# Patient Record
Sex: FEMALE | Race: WHITE | NOT HISPANIC OR LATINO | Employment: OTHER | URBAN - METROPOLITAN AREA
[De-identification: names, ages, dates, MRNs, and addresses within clinical notes are randomized per-mention and may not be internally consistent; named-entity substitution may affect disease eponyms.]

---

## 2023-10-17 ENCOUNTER — APPOINTMENT (OUTPATIENT)
Dept: RADIOLOGY | Facility: HOSPITAL | Age: 78
End: 2023-10-17
Payer: MEDICARE

## 2023-10-17 ENCOUNTER — HOSPITAL ENCOUNTER (OUTPATIENT)
Facility: HOSPITAL | Age: 78
Setting detail: OBSERVATION
Discharge: HOME | End: 2023-10-19
Attending: EMERGENCY MEDICINE | Admitting: SURGERY
Payer: MEDICARE

## 2023-10-17 DIAGNOSIS — R52 INTRACTABLE PAIN: Primary | ICD-10-CM

## 2023-10-17 DIAGNOSIS — R07.89 CHEST WALL PAIN: ICD-10-CM
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
## 2023-10-17 LAB
ALBUMIN SERPL BCP-MCNC: 4.5 G/DL (ref 3.4–5)
ALP SERPL-CCNC: 70 U/L (ref 33–136)
ALT SERPL W P-5'-P-CCNC: 11 U/L (ref 7–45)
ANION GAP SERPL CALC-SCNC: 11 MMOL/L (ref 10–20)
AST SERPL W P-5'-P-CCNC: 17 U/L (ref 9–39)
BASOPHILS # BLD AUTO: 0.04 X10*3/UL (ref 0–0.1)
BASOPHILS NFR BLD AUTO: 0.5 %
BILIRUB SERPL-MCNC: 1.6 MG/DL (ref 0–1.2)
BNP SERPL-MCNC: 346 PG/ML (ref 0–99)
BUN SERPL-MCNC: 18 MG/DL (ref 6–23)
CALCIUM SERPL-MCNC: 9.8 MG/DL (ref 8.6–10.3)
CARDIAC TROPONIN I PNL SERPL HS: 23 NG/L (ref 0–13)
CARDIAC TROPONIN I PNL SERPL HS: 25 NG/L (ref 0–13)
CHLORIDE SERPL-SCNC: 102 MMOL/L (ref 98–107)
CO2 SERPL-SCNC: 31 MMOL/L (ref 21–32)
CREAT SERPL-MCNC: 0.93 MG/DL (ref 0.5–1.05)
EOSINOPHIL # BLD AUTO: 0.19 X10*3/UL (ref 0–0.4)
EOSINOPHIL NFR BLD AUTO: 2.6 %
ERYTHROCYTE [DISTWIDTH] IN BLOOD BY AUTOMATED COUNT: 14.4 % (ref 11.5–14.5)
ERYTHROCYTE [DISTWIDTH] IN BLOOD BY AUTOMATED COUNT: 14.5 % (ref 11.5–14.5)
ETHANOL SERPL-MCNC: <10 MG/DL
GFR SERPL CREATININE-BSD FRML MDRD: 63 ML/MIN/1.73M*2
GLUCOSE SERPL-MCNC: 106 MG/DL (ref 74–99)
HCT VFR BLD AUTO: 42.2 % (ref 36–46)
HCT VFR BLD AUTO: 46.5 % (ref 36–46)
HGB BLD-MCNC: 13.9 G/DL (ref 12–16)
HGB BLD-MCNC: 14.6 G/DL (ref 12–16)
IMM GRANULOCYTES # BLD AUTO: 0.03 X10*3/UL (ref 0–0.5)
IMM GRANULOCYTES NFR BLD AUTO: 0.4 % (ref 0–0.9)
INR PPP: 2.5 (ref 0.9–1.1)
LYMPHOCYTES # BLD AUTO: 0.59 X10*3/UL (ref 0.8–3)
LYMPHOCYTES NFR BLD AUTO: 8 %
MCH RBC QN AUTO: 29.6 PG (ref 26–34)
MCH RBC QN AUTO: 30.9 PG (ref 26–34)
MCHC RBC AUTO-ENTMCNC: 31.4 G/DL (ref 32–36)
MCHC RBC AUTO-ENTMCNC: 32.9 G/DL (ref 32–36)
MCV RBC AUTO: 94 FL (ref 80–100)
MCV RBC AUTO: 94 FL (ref 80–100)
MONOCYTES # BLD AUTO: 0.49 X10*3/UL (ref 0.05–0.8)
MONOCYTES NFR BLD AUTO: 6.7 %
NEUTROPHILS # BLD AUTO: 6.01 X10*3/UL (ref 1.6–5.5)
NEUTROPHILS NFR BLD AUTO: 81.8 %
NRBC BLD-RTO: 0 /100 WBCS (ref 0–0)
NRBC BLD-RTO: 0 /100 WBCS (ref 0–0)
PLATELET # BLD AUTO: 157 X10*3/UL (ref 150–450)
PLATELET # BLD AUTO: 162 X10*3/UL (ref 150–450)
PMV BLD AUTO: 9.3 FL (ref 7.5–11.5)
PMV BLD AUTO: 9.4 FL (ref 7.5–11.5)
POTASSIUM SERPL-SCNC: 3.5 MMOL/L (ref 3.5–5.3)
PROT SERPL-MCNC: 7 G/DL (ref 6.4–8.2)
PROTHROMBIN TIME: 28.6 SECONDS (ref 9.8–12.8)
RBC # BLD AUTO: 4.5 X10*6/UL (ref 4–5.2)
RBC # BLD AUTO: 4.94 X10*6/UL (ref 4–5.2)
SODIUM SERPL-SCNC: 140 MMOL/L (ref 136–145)
WBC # BLD AUTO: 7 X10*3/UL (ref 4.4–11.3)
WBC # BLD AUTO: 7.4 X10*3/UL (ref 4.4–11.3)

## 2023-10-17 PROCEDURE — 84075 ASSAY ALKALINE PHOSPHATASE: CPT | Performed by: EMERGENCY MEDICINE

## 2023-10-17 PROCEDURE — 36415 COLL VENOUS BLD VENIPUNCTURE: CPT | Performed by: EMERGENCY MEDICINE

## 2023-10-17 PROCEDURE — 74177 CT ABD & PELVIS W/CONTRAST: CPT | Mod: MG

## 2023-10-17 PROCEDURE — 85025 COMPLETE CBC W/AUTO DIFF WBC: CPT | Performed by: EMERGENCY MEDICINE

## 2023-10-17 PROCEDURE — 73590 X-RAY EXAM OF LOWER LEG: CPT | Mod: RIGHT SIDE | Performed by: RADIOLOGY

## 2023-10-17 PROCEDURE — 2500000001 HC RX 250 WO HCPCS SELF ADMINISTERED DRUGS (ALT 637 FOR MEDICARE OP): Performed by: SURGERY

## 2023-10-17 PROCEDURE — 84484 ASSAY OF TROPONIN QUANT: CPT | Performed by: EMERGENCY MEDICINE

## 2023-10-17 PROCEDURE — 2550000001 HC RX 255 CONTRASTS: Performed by: EMERGENCY MEDICINE

## 2023-10-17 PROCEDURE — 71045 X-RAY EXAM CHEST 1 VIEW: CPT | Mod: FY

## 2023-10-17 PROCEDURE — 73552 X-RAY EXAM OF FEMUR 2/>: CPT | Mod: BILATERAL PROCEDURE | Performed by: RADIOLOGY

## 2023-10-17 PROCEDURE — 73552 X-RAY EXAM OF FEMUR 2/>: CPT | Mod: 50

## 2023-10-17 PROCEDURE — 85027 COMPLETE CBC AUTOMATED: CPT | Mod: 59 | Performed by: SURGERY

## 2023-10-17 PROCEDURE — 70450 CT HEAD/BRAIN W/O DYE: CPT | Performed by: RADIOLOGY

## 2023-10-17 PROCEDURE — 71045 X-RAY EXAM CHEST 1 VIEW: CPT | Performed by: RADIOLOGY

## 2023-10-17 PROCEDURE — 74177 CT ABD & PELVIS W/CONTRAST: CPT | Performed by: RADIOLOGY

## 2023-10-17 PROCEDURE — G0378 HOSPITAL OBSERVATION PER HR: HCPCS

## 2023-10-17 PROCEDURE — 84484 ASSAY OF TROPONIN QUANT: CPT | Mod: 59 | Performed by: EMERGENCY MEDICINE

## 2023-10-17 PROCEDURE — 72125 CT NECK SPINE W/O DYE: CPT | Mod: MG

## 2023-10-17 PROCEDURE — 2500000004 HC RX 250 GENERAL PHARMACY W/ HCPCS (ALT 636 FOR OP/ED): Performed by: EMERGENCY MEDICINE

## 2023-10-17 PROCEDURE — 82077 ASSAY SPEC XCP UR&BREATH IA: CPT | Performed by: EMERGENCY MEDICINE

## 2023-10-17 PROCEDURE — 73590 X-RAY EXAM OF LOWER LEG: CPT | Mod: RT

## 2023-10-17 PROCEDURE — 83880 ASSAY OF NATRIURETIC PEPTIDE: CPT | Performed by: EMERGENCY MEDICINE

## 2023-10-17 PROCEDURE — 70450 CT HEAD/BRAIN W/O DYE: CPT | Mod: ME

## 2023-10-17 PROCEDURE — 2500000004 HC RX 250 GENERAL PHARMACY W/ HCPCS (ALT 636 FOR OP/ED): Mod: MUE | Performed by: PHYSICIAN ASSISTANT

## 2023-10-17 PROCEDURE — 99223 1ST HOSP IP/OBS HIGH 75: CPT | Performed by: SURGERY

## 2023-10-17 PROCEDURE — G0390 TRAUMA RESPONS W/HOSP CRITI: HCPCS

## 2023-10-17 PROCEDURE — 99285 EMERGENCY DEPT VISIT HI MDM: CPT | Performed by: EMERGENCY MEDICINE

## 2023-10-17 PROCEDURE — 2500000005 HC RX 250 GENERAL PHARMACY W/O HCPCS: Performed by: SURGERY

## 2023-10-17 PROCEDURE — 2500000005 HC RX 250 GENERAL PHARMACY W/O HCPCS: Performed by: EMERGENCY MEDICINE

## 2023-10-17 PROCEDURE — 72125 CT NECK SPINE W/O DYE: CPT | Performed by: RADIOLOGY

## 2023-10-17 PROCEDURE — 85610 PROTHROMBIN TIME: CPT | Performed by: EMERGENCY MEDICINE

## 2023-10-17 PROCEDURE — 71260 CT THORAX DX C+: CPT | Performed by: RADIOLOGY

## 2023-10-17 PROCEDURE — G0390 TRAUMA RESPONS W/HOSP CRITI: HCPCS | Performed by: STUDENT IN AN ORGANIZED HEALTH CARE EDUCATION/TRAINING PROGRAM

## 2023-10-17 RX ORDER — LIDOCAINE 560 MG/1
1 PATCH PERCUTANEOUS; TOPICAL; TRANSDERMAL DAILY
Status: DISCONTINUED | OUTPATIENT
Start: 2023-10-17 | End: 2023-10-19 | Stop reason: HOSPADM

## 2023-10-17 RX ORDER — FLUTICASONE PROPIONATE 50 MCG
2 SPRAY, SUSPENSION (ML) NASAL DAILY PRN
COMMUNITY

## 2023-10-17 RX ORDER — METOLAZONE 2.5 MG/1
2.5 TABLET ORAL 3 TIMES WEEKLY
COMMUNITY
Start: 2023-08-08

## 2023-10-17 RX ORDER — MORPHINE SULFATE 2 MG/ML
2 INJECTION, SOLUTION INTRAMUSCULAR; INTRAVENOUS
Status: DISCONTINUED | OUTPATIENT
Start: 2023-10-17 | End: 2023-10-19 | Stop reason: HOSPADM

## 2023-10-17 RX ORDER — FUROSEMIDE 20 MG/1
20 TABLET ORAL DAILY
COMMUNITY
Start: 2023-05-16

## 2023-10-17 RX ORDER — IPRATROPIUM BROMIDE 42 UG/1
1 SPRAY, METERED NASAL 4 TIMES DAILY
Status: DISCONTINUED | OUTPATIENT
Start: 2023-10-18 | End: 2023-10-19

## 2023-10-17 RX ORDER — METOLAZONE 5 MG/1
2.5 TABLET ORAL 3 TIMES WEEKLY
Status: DISCONTINUED | OUTPATIENT
Start: 2023-10-18 | End: 2023-10-19 | Stop reason: HOSPADM

## 2023-10-17 RX ORDER — METOPROLOL SUCCINATE 50 MG/1
50 TABLET, EXTENDED RELEASE ORAL DAILY
Status: DISCONTINUED | OUTPATIENT
Start: 2023-10-17 | End: 2023-10-19 | Stop reason: HOSPADM

## 2023-10-17 RX ORDER — AZELASTINE 1 MG/ML
1 SPRAY, METERED NASAL 2 TIMES DAILY
Status: DISCONTINUED | OUTPATIENT
Start: 2023-10-17 | End: 2023-10-19 | Stop reason: HOSPADM

## 2023-10-17 RX ORDER — OXYBUTYNIN CHLORIDE 10 MG/1
10 TABLET, EXTENDED RELEASE ORAL DAILY
Status: ON HOLD | COMMUNITY
End: 2023-10-18 | Stop reason: ENTERED-IN-ERROR

## 2023-10-17 RX ORDER — ATORVASTATIN CALCIUM 10 MG/1
10 TABLET, FILM COATED ORAL NIGHTLY
Status: DISCONTINUED | OUTPATIENT
Start: 2023-10-17 | End: 2023-10-19 | Stop reason: HOSPADM

## 2023-10-17 RX ORDER — TROSPIUM CHLORIDE ER 60 MG/1
60 CAPSULE ORAL DAILY
Status: ON HOLD | COMMUNITY
End: 2023-10-18 | Stop reason: ENTERED-IN-ERROR

## 2023-10-17 RX ORDER — FLUTICASONE PROPIONATE 50 MCG
2 SPRAY, SUSPENSION (ML) NASAL DAILY
Status: DISCONTINUED | OUTPATIENT
Start: 2023-10-18 | End: 2023-10-19 | Stop reason: HOSPADM

## 2023-10-17 RX ORDER — ONDANSETRON HYDROCHLORIDE 2 MG/ML
4 INJECTION, SOLUTION INTRAVENOUS EVERY 8 HOURS PRN
Status: DISCONTINUED | OUTPATIENT
Start: 2023-10-17 | End: 2023-10-19 | Stop reason: HOSPADM

## 2023-10-17 RX ORDER — MORPHINE SULFATE 4 MG/ML
4 INJECTION, SOLUTION INTRAMUSCULAR; INTRAVENOUS ONCE
Status: COMPLETED | OUTPATIENT
Start: 2023-10-17 | End: 2023-10-17

## 2023-10-17 RX ORDER — ACETAMINOPHEN 325 MG/1
975 TABLET ORAL EVERY 8 HOURS
Status: DISCONTINUED | OUTPATIENT
Start: 2023-10-17 | End: 2023-10-19 | Stop reason: HOSPADM

## 2023-10-17 RX ORDER — IPRATROPIUM BROMIDE 42 UG/1
1 SPRAY, METERED NASAL 4 TIMES DAILY
Status: ON HOLD | COMMUNITY
End: 2023-10-18 | Stop reason: ENTERED-IN-ERROR

## 2023-10-17 RX ORDER — OXYBUTYNIN CHLORIDE 5 MG/1
5 TABLET ORAL 2 TIMES DAILY
Status: DISCONTINUED | OUTPATIENT
Start: 2023-10-17 | End: 2023-10-19

## 2023-10-17 RX ORDER — PILOCARPINE HYDROCHLORIDE 5 MG/1
5 TABLET, FILM COATED ORAL 3 TIMES DAILY
Status: ON HOLD | COMMUNITY
End: 2023-10-18 | Stop reason: ENTERED-IN-ERROR

## 2023-10-17 RX ORDER — AMLODIPINE BESYLATE 5 MG/1
5 TABLET ORAL DAILY
Status: ON HOLD | COMMUNITY
End: 2023-10-18 | Stop reason: ENTERED-IN-ERROR

## 2023-10-17 RX ORDER — CARBIDOPA AND LEVODOPA 25; 100 MG/1; MG/1
1 TABLET ORAL
Status: ON HOLD | COMMUNITY
End: 2023-10-18 | Stop reason: ENTERED-IN-ERROR

## 2023-10-17 RX ORDER — FUROSEMIDE 20 MG/1
20 TABLET ORAL DAILY
Status: DISCONTINUED | OUTPATIENT
Start: 2023-10-17 | End: 2023-10-19 | Stop reason: HOSPADM

## 2023-10-17 RX ORDER — LIDOCAINE 560 MG/1
1 PATCH PERCUTANEOUS; TOPICAL; TRANSDERMAL DAILY
Status: DISCONTINUED | OUTPATIENT
Start: 2023-10-17 | End: 2023-10-17

## 2023-10-17 RX ORDER — WARFARIN 2.5 MG/1
2.5 TABLET ORAL
COMMUNITY
Start: 2023-10-14

## 2023-10-17 RX ORDER — KETOROLAC TROMETHAMINE 30 MG/ML
7.5 INJECTION, SOLUTION INTRAMUSCULAR; INTRAVENOUS ONCE
Status: COMPLETED | OUTPATIENT
Start: 2023-10-17 | End: 2023-10-17

## 2023-10-17 RX ORDER — FUROSEMIDE 10 MG/ML
40 INJECTION INTRAMUSCULAR; INTRAVENOUS ONCE
Status: COMPLETED | OUTPATIENT
Start: 2023-10-17 | End: 2023-10-17

## 2023-10-17 RX ORDER — PILOCARPINE HYDROCHLORIDE 5 MG/1
5 TABLET, FILM COATED ORAL 3 TIMES DAILY
Status: DISCONTINUED | OUTPATIENT
Start: 2023-10-17 | End: 2023-10-18 | Stop reason: ALTCHOICE

## 2023-10-17 RX ORDER — METHOCARBAMOL 500 MG/1
250 TABLET, FILM COATED ORAL EVERY 12 HOURS
Status: DISCONTINUED | OUTPATIENT
Start: 2023-10-17 | End: 2023-10-19 | Stop reason: HOSPADM

## 2023-10-17 RX ORDER — NYSTATIN 100000 [USP'U]/G
1 POWDER TOPICAL 2 TIMES DAILY
Status: DISCONTINUED | OUTPATIENT
Start: 2023-10-17 | End: 2023-10-19 | Stop reason: HOSPADM

## 2023-10-17 RX ORDER — MIRABEGRON 25 MG/1
25 TABLET, FILM COATED, EXTENDED RELEASE ORAL DAILY
Status: ON HOLD | COMMUNITY
End: 2023-10-18 | Stop reason: ENTERED-IN-ERROR

## 2023-10-17 RX ORDER — POLYETHYLENE GLYCOL 3350 17 G/17G
17 POWDER, FOR SOLUTION ORAL DAILY
Status: DISCONTINUED | OUTPATIENT
Start: 2023-10-17 | End: 2023-10-19 | Stop reason: HOSPADM

## 2023-10-17 RX ORDER — CARBIDOPA AND LEVODOPA 25; 100 MG/1; MG/1
1 TABLET ORAL DAILY
Status: DISCONTINUED | OUTPATIENT
Start: 2023-10-17 | End: 2023-10-19

## 2023-10-17 RX ORDER — METOPROLOL SUCCINATE 50 MG/1
50 TABLET, EXTENDED RELEASE ORAL DAILY
COMMUNITY
Start: 2023-03-27

## 2023-10-17 RX ORDER — ATORVASTATIN CALCIUM 10 MG/1
1 TABLET, FILM COATED ORAL DAILY
COMMUNITY
Start: 2023-05-04

## 2023-10-17 RX ORDER — OXYCODONE HYDROCHLORIDE 5 MG/1
5 TABLET ORAL EVERY 6 HOURS PRN
Status: DISCONTINUED | OUTPATIENT
Start: 2023-10-17 | End: 2023-10-19 | Stop reason: HOSPADM

## 2023-10-17 RX ORDER — NALOXONE HYDROCHLORIDE 0.4 MG/ML
0.2 INJECTION, SOLUTION INTRAMUSCULAR; INTRAVENOUS; SUBCUTANEOUS EVERY 5 MIN PRN
Status: DISCONTINUED | OUTPATIENT
Start: 2023-10-17 | End: 2023-10-19 | Stop reason: HOSPADM

## 2023-10-17 RX ORDER — OXYCODONE HYDROCHLORIDE 5 MG/1
2.5 TABLET ORAL EVERY 6 HOURS PRN
Status: DISCONTINUED | OUTPATIENT
Start: 2023-10-17 | End: 2023-10-19 | Stop reason: HOSPADM

## 2023-10-17 RX ORDER — MUPIROCIN 20 MG/G
1 OINTMENT TOPICAL
Status: ON HOLD | COMMUNITY
Start: 2023-06-27 | End: 2023-10-18 | Stop reason: ENTERED-IN-ERROR

## 2023-10-17 RX ORDER — AZELASTINE 1 MG/ML
1 SPRAY, METERED NASAL 2 TIMES DAILY
COMMUNITY

## 2023-10-17 RX ORDER — AMLODIPINE BESYLATE 5 MG/1
5 TABLET ORAL DAILY
Status: DISCONTINUED | OUTPATIENT
Start: 2023-10-17 | End: 2023-10-19

## 2023-10-17 RX ORDER — GUAIFENESIN 600 MG/1
600 TABLET, EXTENDED RELEASE ORAL 2 TIMES DAILY
Status: ON HOLD | COMMUNITY
Start: 2023-04-13 | End: 2023-10-18 | Stop reason: ENTERED-IN-ERROR

## 2023-10-17 RX ORDER — ONDANSETRON 4 MG/1
4 TABLET, FILM COATED ORAL EVERY 8 HOURS PRN
Status: DISCONTINUED | OUTPATIENT
Start: 2023-10-17 | End: 2023-10-19 | Stop reason: HOSPADM

## 2023-10-17 RX ORDER — WARFARIN 2.5 MG/1
2.5 TABLET ORAL DAILY
Status: DISCONTINUED | OUTPATIENT
Start: 2023-10-17 | End: 2023-10-19 | Stop reason: HOSPADM

## 2023-10-17 RX ADMIN — FUROSEMIDE 40 MG: 10 INJECTION, SOLUTION INTRAMUSCULAR; INTRAVENOUS at 13:45

## 2023-10-17 RX ADMIN — METOPROLOL SUCCINATE 50 MG: 50 TABLET, EXTENDED RELEASE ORAL at 18:49

## 2023-10-17 RX ADMIN — MORPHINE SULFATE 4 MG: 4 INJECTION, SOLUTION INTRAMUSCULAR; INTRAVENOUS at 13:35

## 2023-10-17 RX ADMIN — ACETAMINOPHEN 975 MG: 325 TABLET ORAL at 18:36

## 2023-10-17 RX ADMIN — MORPHINE SULFATE 4 MG: 4 INJECTION, SOLUTION INTRAMUSCULAR; INTRAVENOUS at 11:51

## 2023-10-17 RX ADMIN — LIDOCAINE 1 PATCH: 4 PATCH TOPICAL at 17:05

## 2023-10-17 RX ADMIN — IOHEXOL 100 ML: 350 INJECTION, SOLUTION INTRAVENOUS at 11:41

## 2023-10-17 RX ADMIN — LIDOCAINE 1 PATCH: 4 PATCH TOPICAL at 13:44

## 2023-10-17 RX ADMIN — ATORVASTATIN CALCIUM 10 MG: 10 TABLET, FILM COATED ORAL at 20:47

## 2023-10-17 RX ADMIN — KETOROLAC TROMETHAMINE 7.5 MG: 30 INJECTION, SOLUTION INTRAMUSCULAR at 13:36

## 2023-10-17 RX ADMIN — AMLODIPINE BESYLATE 5 MG: 5 TABLET ORAL at 18:49

## 2023-10-17 RX ADMIN — WARFARIN SODIUM 2.5 MG: 2.5 TABLET ORAL at 20:47

## 2023-10-17 SDOH — SOCIAL STABILITY: SOCIAL INSECURITY: WERE YOU ABLE TO COMPLETE ALL THE BEHAVIORAL HEALTH SCREENINGS?: YES

## 2023-10-17 SDOH — SOCIAL STABILITY: SOCIAL INSECURITY
WITHIN THE LAST YEAR, HAVE YOU BEEN KICKED, HIT, SLAPPED, OR OTHERWISE PHYSICALLY HURT BY YOUR PARTNER OR EX-PARTNER?: NO

## 2023-10-17 SDOH — SOCIAL STABILITY: SOCIAL INSECURITY: WITHIN THE LAST YEAR, HAVE YOU BEEN HUMILIATED OR EMOTIONALLY ABUSED IN OTHER WAYS BY YOUR PARTNER OR EX-PARTNER?: NO

## 2023-10-17 SDOH — ECONOMIC STABILITY: INCOME INSECURITY: HOW HARD IS IT FOR YOU TO PAY FOR THE VERY BASICS LIKE FOOD, HOUSING, MEDICAL CARE, AND HEATING?: PATIENT DECLINED

## 2023-10-17 SDOH — SOCIAL STABILITY: SOCIAL NETWORK: IN A TYPICAL WEEK, HOW MANY TIMES DO YOU TALK ON THE PHONE WITH FAMILY, FRIENDS, OR NEIGHBORS?: PATIENT DECLINED

## 2023-10-17 SDOH — SOCIAL STABILITY: SOCIAL NETWORK: HOW OFTEN DO YOU GET TOGETHER WITH FRIENDS OR RELATIVES?: PATIENT DECLINED

## 2023-10-17 SDOH — HEALTH STABILITY: MENTAL HEALTH: HOW OFTEN DO YOU HAVE A DRINK CONTAINING ALCOHOL?: NEVER

## 2023-10-17 SDOH — SOCIAL STABILITY: SOCIAL INSECURITY
WITHIN THE LAST YEAR, HAVE TO BEEN RAPED OR FORCED TO HAVE ANY KIND OF SEXUAL ACTIVITY BY YOUR PARTNER OR EX-PARTNER?: NO

## 2023-10-17 SDOH — ECONOMIC STABILITY: TRANSPORTATION INSECURITY
IN THE PAST 12 MONTHS, HAS LACK OF TRANSPORTATION KEPT YOU FROM MEETINGS, WORK, OR FROM GETTING THINGS NEEDED FOR DAILY LIVING?: PATIENT DECLINED

## 2023-10-17 SDOH — SOCIAL STABILITY: SOCIAL INSECURITY: DO YOU FEEL ANYONE HAS EXPLOITED OR TAKEN ADVANTAGE OF YOU FINANCIALLY OR OF YOUR PERSONAL PROPERTY?: NO

## 2023-10-17 SDOH — SOCIAL STABILITY: SOCIAL INSECURITY: HAS ANYONE EVER THREATENED TO HURT YOUR FAMILY OR YOUR PETS?: NO

## 2023-10-17 SDOH — HEALTH STABILITY: MENTAL HEALTH: HOW OFTEN DO YOU HAVE 6 OR MORE DRINKS ON ONE OCCASION?: NEVER

## 2023-10-17 SDOH — SOCIAL STABILITY: SOCIAL INSECURITY: DOES ANYONE TRY TO KEEP YOU FROM HAVING/CONTACTING OTHER FRIENDS OR DOING THINGS OUTSIDE YOUR HOME?: NO

## 2023-10-17 SDOH — SOCIAL STABILITY: SOCIAL INSECURITY: WITHIN THE LAST YEAR, HAVE YOU BEEN AFRAID OF YOUR PARTNER OR EX-PARTNER?: NO

## 2023-10-17 SDOH — SOCIAL STABILITY: SOCIAL NETWORK: HOW OFTEN DO YOU ATTEND CHURCH OR RELIGIOUS SERVICES?: PATIENT DECLINED

## 2023-10-17 SDOH — SOCIAL STABILITY: SOCIAL INSECURITY: ABUSE: ADULT

## 2023-10-17 SDOH — SOCIAL STABILITY: SOCIAL NETWORK: HOW OFTEN DO YOU ATTENT MEETINGS OF THE CLUB OR ORGANIZATION YOU BELONG TO?: PATIENT DECLINED

## 2023-10-17 SDOH — ECONOMIC STABILITY: INCOME INSECURITY
IN THE PAST 12 MONTHS, HAS THE ELECTRIC, GAS, OIL, OR WATER COMPANY THREATENED TO SHUT OFF SERVICE IN YOUR HOME?: PATIENT REFUSED

## 2023-10-17 SDOH — ECONOMIC STABILITY: HOUSING INSECURITY
IN THE LAST 12 MONTHS, WAS THERE A TIME WHEN YOU DID NOT HAVE A STEADY PLACE TO SLEEP OR SLEPT IN A SHELTER (INCLUDING NOW)?: PATIENT REFUSED

## 2023-10-17 SDOH — SOCIAL STABILITY: SOCIAL INSECURITY: ARE THERE ANY APPARENT SIGNS OF INJURIES/BEHAVIORS THAT COULD BE RELATED TO ABUSE/NEGLECT?: NO

## 2023-10-17 SDOH — SOCIAL STABILITY: SOCIAL INSECURITY: HAVE YOU HAD THOUGHTS OF HARMING ANYONE ELSE?: NO

## 2023-10-17 SDOH — HEALTH STABILITY: MENTAL HEALTH: HOW MANY STANDARD DRINKS CONTAINING ALCOHOL DO YOU HAVE ON A TYPICAL DAY?: PATIENT DOES NOT DRINK

## 2023-10-17 SDOH — SOCIAL STABILITY: SOCIAL NETWORK: ARE YOU MARRIED, WIDOWED, DIVORCED, SEPARATED, NEVER MARRIED, OR LIVING WITH A PARTNER?: PATIENT DECLINED

## 2023-10-17 SDOH — ECONOMIC STABILITY: TRANSPORTATION INSECURITY
IN THE PAST 12 MONTHS, HAS THE LACK OF TRANSPORTATION KEPT YOU FROM MEDICAL APPOINTMENTS OR FROM GETTING MEDICATIONS?: PATIENT DECLINED

## 2023-10-17 SDOH — SOCIAL STABILITY: SOCIAL INSECURITY: ARE YOU OR HAVE YOU BEEN THREATENED OR ABUSED PHYSICALLY, EMOTIONALLY, OR SEXUALLY BY ANYONE?: NO

## 2023-10-17 SDOH — ECONOMIC STABILITY: HOUSING INSECURITY: IN THE LAST 12 MONTHS, HOW MANY PLACES HAVE YOU LIVED?: 1

## 2023-10-17 SDOH — ECONOMIC STABILITY: INCOME INSECURITY: IN THE LAST 12 MONTHS, WAS THERE A TIME WHEN YOU WERE NOT ABLE TO PAY THE MORTGAGE OR RENT ON TIME?: PATIENT REFUSED

## 2023-10-17 SDOH — SOCIAL STABILITY: SOCIAL NETWORK
DO YOU BELONG TO ANY CLUBS OR ORGANIZATIONS SUCH AS CHURCH GROUPS UNIONS, FRATERNAL OR ATHLETIC GROUPS, OR SCHOOL GROUPS?: PATIENT DECLINED

## 2023-10-17 SDOH — ECONOMIC STABILITY: FOOD INSECURITY: WITHIN THE PAST 12 MONTHS, YOU WORRIED THAT YOUR FOOD WOULD RUN OUT BEFORE YOU GOT MONEY TO BUY MORE.: PATIENT DECLINED

## 2023-10-17 SDOH — SOCIAL STABILITY: SOCIAL INSECURITY: DO YOU FEEL UNSAFE GOING BACK TO THE PLACE WHERE YOU ARE LIVING?: NO

## 2023-10-17 SDOH — ECONOMIC STABILITY: FOOD INSECURITY: WITHIN THE PAST 12 MONTHS, THE FOOD YOU BOUGHT JUST DIDN'T LAST AND YOU DIDN'T HAVE MONEY TO GET MORE.: PATIENT DECLINED

## 2023-10-17 SDOH — HEALTH STABILITY: MENTAL HEALTH
STRESS IS WHEN SOMEONE FEELS TENSE, NERVOUS, ANXIOUS, OR CAN'T SLEEP AT NIGHT BECAUSE THEIR MIND IS TROUBLED. HOW STRESSED ARE YOU?: PATIENT DECLINED

## 2023-10-17 ASSESSMENT — COLUMBIA-SUICIDE SEVERITY RATING SCALE - C-SSRS
1. IN THE PAST MONTH, HAVE YOU WISHED YOU WERE DEAD OR WISHED YOU COULD GO TO SLEEP AND NOT WAKE UP?: NO
2. HAVE YOU ACTUALLY HAD ANY THOUGHTS OF KILLING YOURSELF?: NO
6. HAVE YOU EVER DONE ANYTHING, STARTED TO DO ANYTHING, OR PREPARED TO DO ANYTHING TO END YOUR LIFE?: NO

## 2023-10-17 ASSESSMENT — PAIN SCALES - PAIN ASSESSMENT IN ADVANCED DEMENTIA (PAINAD): BODYLANGUAGE: RELAXED

## 2023-10-17 ASSESSMENT — COGNITIVE AND FUNCTIONAL STATUS - GENERAL
MOVING FROM LYING ON BACK TO SITTING ON SIDE OF FLAT BED WITH BEDRAILS: A LITTLE
STANDING UP FROM CHAIR USING ARMS: A LITTLE
MOBILITY SCORE: 18
HELP NEEDED FOR BATHING: A LITTLE
MOVING FROM LYING ON BACK TO SITTING ON SIDE OF FLAT BED WITH BEDRAILS: A LITTLE
EATING MEALS: A LITTLE
DRESSING REGULAR LOWER BODY CLOTHING: A LITTLE
TOILETING: A LITTLE
STANDING UP FROM CHAIR USING ARMS: A LITTLE
HELP NEEDED FOR BATHING: A LITTLE
TURNING FROM BACK TO SIDE WHILE IN FLAT BAD: A LITTLE
MOBILITY SCORE: 18
WALKING IN HOSPITAL ROOM: A LITTLE
PERSONAL GROOMING: A LITTLE
DAILY ACTIVITIY SCORE: 18
DRESSING REGULAR LOWER BODY CLOTHING: A LITTLE
MOVING TO AND FROM BED TO CHAIR: A LITTLE
CLIMB 3 TO 5 STEPS WITH RAILING: A LITTLE
DRESSING REGULAR UPPER BODY CLOTHING: A LITTLE
CLIMB 3 TO 5 STEPS WITH RAILING: A LITTLE
DRESSING REGULAR UPPER BODY CLOTHING: A LITTLE
PERSONAL GROOMING: A LITTLE
MOVING TO AND FROM BED TO CHAIR: A LITTLE
PATIENT BASELINE BEDBOUND: NO
TOILETING: A LITTLE
WALKING IN HOSPITAL ROOM: A LITTLE
TURNING FROM BACK TO SIDE WHILE IN FLAT BAD: A LITTLE

## 2023-10-17 ASSESSMENT — ENCOUNTER SYMPTOMS
NAUSEA: 0
DIZZINESS: 0
VOMITING: 0
NUMBNESS: 0
FEVER: 0
NECK PAIN: 0
SORE THROAT: 0
WHEEZING: 0
SHORTNESS OF BREATH: 0
LIGHT-HEADEDNESS: 0
ABDOMINAL PAIN: 0
PALPITATIONS: 0
CHILLS: 0
ARTHRALGIAS: 0
APPETITE CHANGE: 0
FREQUENCY: 0
DYSURIA: 0

## 2023-10-17 ASSESSMENT — ACTIVITIES OF DAILY LIVING (ADL)
HEARING - RIGHT EAR: FUNCTIONAL
BATHING: NEEDS ASSISTANCE
JUDGMENT_ADEQUATE_SAFELY_COMPLETE_DAILY_ACTIVITIES: NO
LACK_OF_TRANSPORTATION: PATIENT DECLINED
ADEQUATE_TO_COMPLETE_ADL: YES
TOILETING: NEEDS ASSISTANCE
FEEDING YOURSELF: NEEDS ASSISTANCE
HEARING - LEFT EAR: FUNCTIONAL
DRESSING YOURSELF: NEEDS ASSISTANCE
ASSISTIVE_DEVICE: WALKER
PATIENT'S MEMORY ADEQUATE TO SAFELY COMPLETE DAILY ACTIVITIES?: NO
WALKS IN HOME: NEEDS ASSISTANCE
GROOMING: NEEDS ASSISTANCE

## 2023-10-17 ASSESSMENT — LIFESTYLE VARIABLES
EVER FELT BAD OR GUILTY ABOUT YOUR DRINKING: NO
AUDIT-C TOTAL SCORE: 0
SKIP TO QUESTIONS 9-10: 1
SKIP TO QUESTIONS 9-10: 1
HAVE YOU EVER FELT YOU SHOULD CUT DOWN ON YOUR DRINKING: NO
HOW OFTEN DO YOU HAVE 6 OR MORE DRINKS ON ONE OCCASION: NEVER
HOW OFTEN DO YOU HAVE A DRINK CONTAINING ALCOHOL: NEVER
REASON UNABLE TO ASSESS: NO
HOW OFTEN DO YOU HAVE 6 OR MORE DRINKS ON ONE OCCASION: NEVER
HOW OFTEN DO YOU HAVE A DRINK CONTAINING ALCOHOL: NEVER
AUDIT-C TOTAL SCORE: 0
HAVE PEOPLE ANNOYED YOU BY CRITICIZING YOUR DRINKING: NO
AUDIT-C TOTAL SCORE: 0
EVER HAD A DRINK FIRST THING IN THE MORNING TO STEADY YOUR NERVES TO GET RID OF A HANGOVER: NO
HOW MANY STANDARD DRINKS CONTAINING ALCOHOL DO YOU HAVE ON A TYPICAL DAY: PATIENT DOES NOT DRINK
PRESCIPTION_ABUSE_PAST_12_MONTHS: NO
SUBSTANCE_ABUSE_PAST_12_MONTHS: NO

## 2023-10-17 ASSESSMENT — PATIENT HEALTH QUESTIONNAIRE - PHQ9
2. FEELING DOWN, DEPRESSED OR HOPELESS: NOT AT ALL
1. LITTLE INTEREST OR PLEASURE IN DOING THINGS: NOT AT ALL
SUM OF ALL RESPONSES TO PHQ9 QUESTIONS 1 & 2: 0
2. FEELING DOWN, DEPRESSED OR HOPELESS: NOT AT ALL
1. LITTLE INTEREST OR PLEASURE IN DOING THINGS: NOT AT ALL
SUM OF ALL RESPONSES TO PHQ9 QUESTIONS 1 & 2: 0

## 2023-10-17 ASSESSMENT — PAIN SCALES - GENERAL
PAINLEVEL_OUTOF10: 8
PAINLEVEL_OUTOF10: 8
PAINLEVEL_OUTOF10: 5 - MODERATE PAIN
PAINLEVEL_OUTOF10: 7
PAINLEVEL_OUTOF10: 5 - MODERATE PAIN
PAINLEVEL_OUTOF10: 0 - NO PAIN

## 2023-10-17 ASSESSMENT — PAIN DESCRIPTION - PROGRESSION: CLINICAL_PROGRESSION: NOT CHANGED

## 2023-10-17 ASSESSMENT — PAIN - FUNCTIONAL ASSESSMENT: PAIN_FUNCTIONAL_ASSESSMENT: 0-10

## 2023-10-17 NOTE — ED PROVIDER NOTES
HPI   Chief Complaint   Patient presents with    Shortness of Breath    Fall       HPI  HISTORY OF PRESENT ILLNESS:  Patient is a 78-year-old female presents the emergency department status post fall.  Patient states that she was getting out of her chair when she got tangled up in a blanket and fell forward into a walker.  She then struck her head and right side against something else in the room.  No loss of consciousness.  She is having right-sided chest wall pain.  Patient is currently on anticoagulation.    Past Medical History: Tachybradycardia syndrome, atrial fibrillation, status post pacemaker, diastolic heart failure, AV valve disorder, hypertension, hyperlipidemia, mitral valve disorder, stroke  Past Surgical History: Significant for pacemaker  Family History: family history not pertinent to presenting problem or chief complaint  Social History: Lives with family    __________________________________________________________  PHYSICAL EXAM:    Appearance: Elderly  female, appears stated age, alert, oriented , cooperative   Skin: Intact,  dry skin, no lesions, rash, petechiae or purpura.   Eyes: PERRLA, EOMs intact,  Conjunctiva pink with no redness or exudates.    HENT: Normocephalic, atraumatic. Nares patent   Neck: Supple. Trachea at midline.   Pulmonary: Lung sounds are clear bilaterally.  There is no rales, rhonchi, or wheezing.  Cardiac: Regular rate and rhythm, no rubs, murmurs, or gallops. No JVD.  Right-sided chest wall tenderness present  Abdomen: Abdomen is soft, nontender, and nondistended.  No palpable organomegaly.  No rebound or guarding.  No CVA tenderness. Nonsurgical abdomen  Genitourinary: Exam deferred.  Musculoskeletal: Patient does have lower L-spine tenderness, at approximately L4.  Patient also had tenderness with the right tib-fib without ankle or knee pain or instability.  No edema, pain, cyanosis, or deformity in extremities. Pulses full and equal.   Neurological: Slow to  speech, cranial nerves are grossly intact, grossly normal sensation, no weakness, no focal findings identified.    __________________________________________________________  MEDICAL DECISION MAKING:    Patient was seen and examined as a limited trauma activation due to concern of mechanical fall. Differential diagnosis for pain includes rib fractures, pneumothorax, intra-abdominal disease process, tib-fib fracture.  Patient had a mechanical fall when her feet tangled with blankets.  Patient was also complaining of shortness of breath here, prompting me to order a cardiac work-up with the patient's extensive    Patient's chest abdomen pelvis showed no rib fractures.  There was evidence of pulmonary edema right heart dysfunction.  Patient was ordered a IV dose of Lasix.  Patient was reevaluated.  She was continued to have pain and difficulty with standing.  I ordered another round of pain medication in addition to symptom spirometer.  Multimodal use including Toradol and lidocaine patch was used.  The patient afterwards was reevaluated UTI pain was uncomfortable with discharge.  Daughter was at bedside and provide additional history.  They were agreeable with admission plan.  I spoke to trauma surgery who agreed to have patient admitted.  She asked that I consulted IMS.  IMS was consulted    Chronic Medical Conditions Significantly Affecting Care: History of anticoagulation    External Records Reviewed: I reviewed recent and relevant outside records including: Cardiology note from May 8, 2023    Patient twelve-lead EKG shows atrial flutter with a ventricular rate 60, normal PA interval, left axis deviation, right bundle branch block present, prolonged QT with QTc of 493 ms, appears unchanged when compared to prior EKG from February 25, 2021    Jimy Brambila  Emergency Medicine                  [place] Coma Scale Score: 15                  Patient History   History reviewed. No pertinent past medical history.  History  reviewed. No pertinent surgical history.  No family history on file.  Social History     Tobacco Use    Smoking status: Not on file    Smokeless tobacco: Not on file   Substance Use Topics    Alcohol use: Not on file    Drug use: Not on file       Physical Exam   ED Triage Vitals   Temp Heart Rate Resp BP   10/17/23 1057 10/17/23 1058 10/17/23 1058 10/17/23 1058   36.3 °C (97.4 °F) 60 20 173/89      SpO2 Temp Source Heart Rate Source Patient Position   10/17/23 1058 10/17/23 1058 -- 10/17/23 1058   95 % Temporal  Lying      BP Location FiO2 (%)     10/17/23 1058 --     Left arm        Physical Exam    ED Course & MDM   ED Course as of 10/17/23 1550   Tue Oct 17, 2023   1531 Talked to transfer [WJ]      ED Course User Index  [WJ] Jimy Brambila DO         Diagnoses as of 10/17/23 1550   Intractable pain   Chest wall pain       Medical Decision Making      Procedure  Procedures     iJmy Brambila DO  10/17/23 1556

## 2023-10-18 PROBLEM — S29.8XXA BLUNT CHEST TRAUMA: Status: ACTIVE | Noted: 2023-10-18

## 2023-10-18 PROBLEM — Z79.01 CHRONIC ANTICOAGULATION: Status: ACTIVE | Noted: 2023-10-18

## 2023-10-18 LAB
ALBUMIN SERPL BCP-MCNC: 3.7 G/DL (ref 3.4–5)
ALP SERPL-CCNC: 60 U/L (ref 33–136)
ALT SERPL W P-5'-P-CCNC: 9 U/L (ref 7–45)
ANION GAP SERPL CALC-SCNC: 10 MMOL/L (ref 10–20)
AST SERPL W P-5'-P-CCNC: 18 U/L (ref 9–39)
BILIRUB SERPL-MCNC: 1.8 MG/DL (ref 0–1.2)
BUN SERPL-MCNC: 18 MG/DL (ref 6–23)
CALCIUM SERPL-MCNC: 8.9 MG/DL (ref 8.6–10.3)
CHLORIDE SERPL-SCNC: 102 MMOL/L (ref 98–107)
CO2 SERPL-SCNC: 31 MMOL/L (ref 21–32)
CREAT SERPL-MCNC: 0.97 MG/DL (ref 0.5–1.05)
ERYTHROCYTE [DISTWIDTH] IN BLOOD BY AUTOMATED COUNT: 14.6 % (ref 11.5–14.5)
GFR SERPL CREATININE-BSD FRML MDRD: 60 ML/MIN/1.73M*2
GLUCOSE SERPL-MCNC: 91 MG/DL (ref 74–99)
HCT VFR BLD AUTO: 39.8 % (ref 36–46)
HGB BLD-MCNC: 12.8 G/DL (ref 12–16)
INR PPP: 2.7 (ref 0.9–1.1)
MCH RBC QN AUTO: 30.2 PG (ref 26–34)
MCHC RBC AUTO-ENTMCNC: 32.2 G/DL (ref 32–36)
MCV RBC AUTO: 94 FL (ref 80–100)
NRBC BLD-RTO: 0 /100 WBCS (ref 0–0)
PLATELET # BLD AUTO: 147 X10*3/UL (ref 150–450)
PMV BLD AUTO: 9.8 FL (ref 7.5–11.5)
POTASSIUM SERPL-SCNC: 3.5 MMOL/L (ref 3.5–5.3)
PROT SERPL-MCNC: 6 G/DL (ref 6.4–8.2)
PROTHROMBIN TIME: 30.5 SECONDS (ref 9.8–12.8)
RBC # BLD AUTO: 4.24 X10*6/UL (ref 4–5.2)
SODIUM SERPL-SCNC: 139 MMOL/L (ref 136–145)
WBC # BLD AUTO: 5.1 X10*3/UL (ref 4.4–11.3)

## 2023-10-18 PROCEDURE — 85610 PROTHROMBIN TIME: CPT | Performed by: PHYSICIAN ASSISTANT

## 2023-10-18 PROCEDURE — G0378 HOSPITAL OBSERVATION PER HR: HCPCS

## 2023-10-18 PROCEDURE — 2500000001 HC RX 250 WO HCPCS SELF ADMINISTERED DRUGS (ALT 637 FOR MEDICARE OP): Performed by: SURGERY

## 2023-10-18 PROCEDURE — 97112 NEUROMUSCULAR REEDUCATION: CPT | Mod: GP

## 2023-10-18 PROCEDURE — 97530 THERAPEUTIC ACTIVITIES: CPT | Mod: GO

## 2023-10-18 PROCEDURE — 2500000004 HC RX 250 GENERAL PHARMACY W/ HCPCS (ALT 636 FOR OP/ED): Mod: MUE | Performed by: SURGERY

## 2023-10-18 PROCEDURE — 2500000002 HC RX 250 W HCPCS SELF ADMINISTERED DRUGS (ALT 637 FOR MEDICARE OP, ALT 636 FOR OP/ED): Performed by: SURGERY

## 2023-10-18 PROCEDURE — 85027 COMPLETE CBC AUTOMATED: CPT | Performed by: SURGERY

## 2023-10-18 PROCEDURE — 80053 COMPREHEN METABOLIC PANEL: CPT | Performed by: SURGERY

## 2023-10-18 PROCEDURE — 97162 PT EVAL MOD COMPLEX 30 MIN: CPT | Mod: GP

## 2023-10-18 PROCEDURE — 99232 SBSQ HOSP IP/OBS MODERATE 35: CPT | Performed by: SURGERY

## 2023-10-18 PROCEDURE — 99221 1ST HOSP IP/OBS SF/LOW 40: CPT | Performed by: PHYSICIAN ASSISTANT

## 2023-10-18 PROCEDURE — 97166 OT EVAL MOD COMPLEX 45 MIN: CPT | Mod: GO

## 2023-10-18 PROCEDURE — 2500000004 HC RX 250 GENERAL PHARMACY W/ HCPCS (ALT 636 FOR OP/ED): Performed by: PHYSICIAN ASSISTANT

## 2023-10-18 PROCEDURE — 2500000005 HC RX 250 GENERAL PHARMACY W/O HCPCS: Performed by: SURGERY

## 2023-10-18 PROCEDURE — 2500000004 HC RX 250 GENERAL PHARMACY W/ HCPCS (ALT 636 FOR OP/ED): Mod: MUE | Performed by: PHYSICIAN ASSISTANT

## 2023-10-18 PROCEDURE — 36415 COLL VENOUS BLD VENIPUNCTURE: CPT | Performed by: SURGERY

## 2023-10-18 RX ORDER — FUROSEMIDE 10 MG/ML
40 INJECTION INTRAMUSCULAR; INTRAVENOUS ONCE
Status: COMPLETED | OUTPATIENT
Start: 2023-10-18 | End: 2023-10-18

## 2023-10-18 RX ADMIN — ATORVASTATIN CALCIUM 10 MG: 10 TABLET, FILM COATED ORAL at 21:07

## 2023-10-18 RX ADMIN — METOLAZONE 2.5 MG: 5 TABLET ORAL at 08:17

## 2023-10-18 RX ADMIN — Medication: at 07:30

## 2023-10-18 RX ADMIN — ACETAMINOPHEN 975 MG: 325 TABLET ORAL at 17:09

## 2023-10-18 RX ADMIN — OXYBUTYNIN CHLORIDE 5 MG: 5 TABLET ORAL at 08:17

## 2023-10-18 RX ADMIN — FUROSEMIDE 20 MG: 20 TABLET ORAL at 08:17

## 2023-10-18 RX ADMIN — LIDOCAINE 1 PATCH: 4 PATCH TOPICAL at 08:16

## 2023-10-18 RX ADMIN — WARFARIN SODIUM 2.5 MG: 2.5 TABLET ORAL at 17:09

## 2023-10-18 RX ADMIN — ACETAMINOPHEN 975 MG: 325 TABLET ORAL at 08:17

## 2023-10-18 RX ADMIN — METHOCARBAMOL 250 MG: 500 TABLET ORAL at 08:17

## 2023-10-18 RX ADMIN — AMLODIPINE BESYLATE 5 MG: 5 TABLET ORAL at 08:16

## 2023-10-18 RX ADMIN — NYSTATIN 1 APPLICATION: 100000 POWDER TOPICAL at 21:03

## 2023-10-18 RX ADMIN — AZELASTINE HYDROCHLORIDE 1 SPRAY: 137 SPRAY, METERED NASAL at 21:03

## 2023-10-18 RX ADMIN — FUROSEMIDE 40 MG: 10 INJECTION, SOLUTION INTRAMUSCULAR; INTRAVENOUS at 15:22

## 2023-10-18 RX ADMIN — METOPROLOL SUCCINATE 50 MG: 50 TABLET, EXTENDED RELEASE ORAL at 08:17

## 2023-10-18 RX ADMIN — FLUTICASONE PROPIONATE 2 SPRAY: 50 SPRAY, METERED NASAL at 08:22

## 2023-10-18 RX ADMIN — CARBIDOPA AND LEVODOPA 1 TABLET: 25; 100 TABLET ORAL at 08:16

## 2023-10-18 ASSESSMENT — COGNITIVE AND FUNCTIONAL STATUS - GENERAL
MOVING FROM LYING ON BACK TO SITTING ON SIDE OF FLAT BED WITH BEDRAILS: A LOT
CLIMB 3 TO 5 STEPS WITH RAILING: TOTAL
DAILY ACTIVITIY SCORE: 13
MOBILITY SCORE: 10
WALKING IN HOSPITAL ROOM: TOTAL
DRESSING REGULAR LOWER BODY CLOTHING: A LOT
HELP NEEDED FOR BATHING: A LOT
CLIMB 3 TO 5 STEPS WITH RAILING: TOTAL
CLIMB 3 TO 5 STEPS WITH RAILING: TOTAL
DRESSING REGULAR LOWER BODY CLOTHING: A LOT
PERSONAL GROOMING: A LOT
HELP NEEDED FOR BATHING: A LOT
STANDING UP FROM CHAIR USING ARMS: TOTAL
DRESSING REGULAR UPPER BODY CLOTHING: A LOT
TOILETING: A LOT
MOBILITY SCORE: 6
PERSONAL GROOMING: A LOT
DAILY ACTIVITIY SCORE: 13
WALKING IN HOSPITAL ROOM: TOTAL
TOILETING: A LOT
MOVING FROM LYING ON BACK TO SITTING ON SIDE OF FLAT BED WITH BEDRAILS: TOTAL
TURNING FROM BACK TO SIDE WHILE IN FLAT BAD: A LOT
DAILY ACTIVITIY SCORE: 13
DRESSING REGULAR UPPER BODY CLOTHING: A LOT
TURNING FROM BACK TO SIDE WHILE IN FLAT BAD: TOTAL
PERSONAL GROOMING: A LOT
WALKING IN HOSPITAL ROOM: TOTAL
MOVING FROM LYING ON BACK TO SITTING ON SIDE OF FLAT BED WITH BEDRAILS: TOTAL
TOILETING: A LOT
EATING MEALS: A LITTLE
EATING MEALS: A LITTLE
HELP NEEDED FOR BATHING: A LOT
MOVING TO AND FROM BED TO CHAIR: A LOT
MOVING TO AND FROM BED TO CHAIR: TOTAL
EATING MEALS: A LITTLE
STANDING UP FROM CHAIR USING ARMS: A LOT
DRESSING REGULAR LOWER BODY CLOTHING: A LOT
TURNING FROM BACK TO SIDE WHILE IN FLAT BAD: TOTAL
STANDING UP FROM CHAIR USING ARMS: TOTAL
MOVING TO AND FROM BED TO CHAIR: TOTAL
DRESSING REGULAR UPPER BODY CLOTHING: A LOT
MOBILITY SCORE: 6

## 2023-10-18 ASSESSMENT — PAIN SCALES - GENERAL
PAINLEVEL_OUTOF10: 8
PAINLEVEL_OUTOF10: 3
PAINLEVEL_OUTOF10: 3

## 2023-10-18 ASSESSMENT — ACTIVITIES OF DAILY LIVING (ADL): LACK_OF_TRANSPORTATION: NO

## 2023-10-18 ASSESSMENT — PAIN - FUNCTIONAL ASSESSMENT
PAIN_FUNCTIONAL_ASSESSMENT: 0-10
PAIN_FUNCTIONAL_ASSESSMENT: 0-10

## 2023-10-18 NOTE — DISCHARGE INSTRUCTIONS
Continue range of motion and use of incentive spirometer. Ambulation and standing with assistance.

## 2023-10-18 NOTE — CARE PLAN
Problem: ADLs  Goal: Patient will perform UB and LB bathing  with minimal assist  level of assistance .  Outcome: Not Progressing     Problem: EXERCISE/STRENGTHENING  Goal: Patient will be educated on BUE HEP for increased ADL performance.  Outcome: Not Progressing     Problem: TRANSFERS  Goal: Patient will complete sit to stand transfer with contact guard assist level of assistance and front wheeled walker in order to improve safety and prepare for out of bed mobility.  Outcome: Not Progressing     Problem: MOBILITY  Goal: Patient will perform Functional mobility min Household distances/Community Distances with contact guard assist level of assistance and front wheeled walker in order to improve safety and functional mobility.  Outcome: Not Progressing     Problem: BALANCE  Goal: Patientt will maintain static standing balance during ADL task with minimal assist  level of assistance drop down in order to demonstrate decreased risk of falling and improved postural control.  Outcome: Not Progressing

## 2023-10-18 NOTE — CARE PLAN
Problem: Mobility  Goal: STRENGTHENING  Description: 20 + REPS AROM/RROM EX INCREASING STRENGTH TO PROGRESS OOB ACTIVITY  Outcome: Not Progressing     Problem: Transfers  Goal: STG - Transfer from bed <> chair  Description: FW WMIN X2 A  Outcome: Not Progressing  Goal: STG - Patient to transfer to and from sit to supine  Description: MIN X1-2 A  Outcome: Not Progressing  Goal: STG - Patient will transfer sit to and from stand  Description: FWW MIN X 2  Outcome: Not Progressing

## 2023-10-18 NOTE — CARE PLAN
The patient's goals for the shift include      The clinical goals for the shift include remain free from falls

## 2023-10-18 NOTE — CONSULTS
Consults  History Of Present Illness  Bethany Espinoza is a 78 y.o. female with progressive supranuclear palsy, atrial fibrillation on coumadin, HFpEF who presented to ED 10/17/23 s/p fall. Patient reports she was home alone for a short period of time as her  had went to the grocery store, she was attempting to get up from her lift chair and her feet got tangled in her blanket and she fell  landing on to the edge of her table on the right side.  Patient states that she has pain along her right posterior lateral chest wall.  She otherwise denies any pain anywhere else.  She denies any new weakness, numbness, changes in sensation, or any other concerns.  She denies any abdominal pain.  She does note that she is having some difficulty with deep inspiration given her pain on the right chest wall.     In the ED:  Vital signs were stable.  Lab work demonstrated BNP at 346, HS troponin 23-25.  Noted to have an elevated INR of 2.5 secondary to Coumadin as she has a history of A-fib.  CT head, C-spine, x-rays of chest and right tib/fib as well as CT chest/abdomen/pelvis did not demonstrate any evidence of acute fracture or dislocation.  CT C/A/P with concern for pulmonary edema.  Given her significant pain, she was admitted to trauma surgery service. IMS consulted day 2 of admission for medical management. Patient seen day 2 of admission. Her only complaint is right rib pain. She reports she would like to go home tomorrow, she has a daughter that lives nearby that can assist her.        Past Medical History  She has no past medical history on file.    Surgical History  Bilateral knee replacement  Carpal tunnel  Appendectomy     Social History  History of tobacco use, quit 30 years ago. Denies EtOH or RD use. Lives in 1 story home with her .     Family History  Mother with ovarian cancer     Allergies  Patient has no known allergies.    Review of Systems  Constitutional:  Negative for appetite change, chills and  fever.   HENT:  Negative for hearing loss and sore throat.    Respiratory:  Negative for wheezing. Mild shortness of breath       Right chest wall pain on deep inspiration   Cardiovascular:  Negative for chest pain and palpitations.   Gastrointestinal:  Negative for abdominal pain, nausea and vomiting.   Genitourinary:  Negative for dysuria and frequency.   Musculoskeletal:  Negative for arthralgias and neck pain.   Skin:  Negative for pallor.   Neurological:  Negative for dizziness, light-headedness and numbness.      Physical Exam  Constitutional: awake, alert, oriented x4. Somewhat slow to respond slightly slurred  Neurologic: CN II-XII grossly intact.  Sensation and motor function intact in bilateral upper and lower extremities.  HEENT: No injuries noted  Neck: soft, supple. Trachea midline. No abrasions, lacerations or contusions. No crepitus  Pulmonary: Clear to auscultation bilaterally, fine crackles bilaterally. Notable tenderness to palpation along right posterior lateral chest wall corresponding to injury area with some overlying ecchymoses  Cardiovascular: RRR, no MGR  Abdomen: soft, nondistended, nontender to palpation. No abrasions, lacerations or contusions  Musculoskeletal: Spine nontender, no stepoffs. Lidocaine patch right shoulder  Skin: Venous stasis changes bilateral lower extremities        Last Recorded Vitals  /79   Pulse 59   Temp 36.6 °C (97.9 °F)   Resp 18   Wt 88 kg (194 lb)   SpO2 92%       Assessment/Plan     Fall with chest wall injury  PT/OT  Per trauma surgery service  Encourage IS  Plan home with homecare  Multimodal pain control with scheduled tylenol, robaxin, PRN oxycodone and morphine     PSP  Continues outpatient follow up  No indication of aspiration  Maintain aspiration precautions     Acute on chronic HFpEF  Resume home medications  Possibly slight exacerbation with notable pulmonary edema and elevated BNP with mild shortness of breath reported by patient- will  give extra 1x dose IV lasix, then resume home medications  Cardiac diet  Not requiring oxygen    PAF on Coumadin  INR therapeutic, continue coumadin, appreciate pharmacy consult for dosing   Continue BB  Monitor on tele    Thank you for involving us in the care of this very pleasant female. We will follow along with you while they remain admitted. Please contact IMS if any clarification needed.       Miya Sanchez PA-C

## 2023-10-18 NOTE — PROGRESS NOTES
Occupational Therapy    Evaluation    Patient Name: Bethany Espinoza  MRN: [id number]  Today's Date: 10/18/2023  Time Calculation  Start Time: 1530  Stop Time: 1604  Time Calculation (min): 34 min        Assessment:  OT Assessment: OT eval completed. Pt appears to have had a significant decline from functional baseline. Pt current level of assist requires Mod A x2 for mobility and to maintain supported standing and Mod toMAx A for LB ADLs, MIN A UB ADLs. Pt  Prognosis: Fair  Evaluation/Treatment Tolerance: Patient limited by fatigue  Medical Staff Made Aware: Yes  End of Session Communication: Bedside nurse  End of Session Patient Position: Bed, 3 rail up, Alarm on  OT Assessment Results: Decreased ADL status, Decreased upper extremity strength, Decreased endurance, Decreased functional mobility  Prognosis: Fair  Evaluation/Treatment Tolerance: Patient limited by fatigue  Medical Staff Made Aware: Yes  Strengths: Support of Caregivers, Support of extended family/friends  Plan:  OT Frequency: 3 times per week  OT Discharge Recommendations: Moderate intensity level of continued care  Equipment Recommended upon Discharge:  (TBD)       Subjective   Current Problem:  1. Intractable pain        2. Chest wall pain          General:  General  Reason for Referral: OT eval/ treat Impaired ADLs (after a fall at home attempting to transfer using her walker when she lost balance and fell landing on to the edge of her table on the right side.)  Referred By: Kosta  Past Medical History Relevant to Rehab: A-fib  Heart failure with preserved ejection fraction  Progressive supranuclear palsy  History of CVA  Co-Treatment: PT  Co-Treatment Reason: Co-eval to maximize pt safety while focusing on discpline  specific goals  Prior to Session Communication: Bedside nurse  Patient Position Received: Bed, 3 rail up, Alarm on  Preferred Learning Style: verbal, kinesthetic  General Comment: Pt seen in room 2304. Pt alert and agreeable to OT eval.  Pt purewick connected  Precautions:  Medical Precautions: Fall precautions  Vital Signs:     Pain:  Pain Assessment  Pain Assessment: 0-10  Pain Score: 3  Pain Location: Back  Pain Interventions: Repositioned, Rest  Response to Interventions: some relief    Objective   Cognition:  Overall Cognitive Status: Within Functional Limits (slow processing, mild dysarthria baseline)           Home Living:  Type of Home:  (Per pt report - Lives at home with spouse, Assist with ADLs, supervision with mobility using AD . Spouse completes IADLs,. sleeps in a lift chair. Falls x4 over the last month)  Prior Function:     IADL History:     ADL:  ADL Comments: Pt fatigues easily with OOB, MOD A x2 to maintain supported standing balance with bed support, MIN A x2 to maintain sitting balance at EOB. ANticipate MOD to MAX A for LB ADLs. Pt able to simulate UB ADL when assessing UE AROM, ANticipate MIN A for UB ADLS  Activity Tolerance:  Endurance: Decreased tolerance for upright activites  Bed Mobility/Transfers:     and     Modalities:     Vision:   Sensation:     Strength:  Strength Comments: BUE strength per clinical observation  3 to 3(+)/5 MMT grossly  Perception:     Coordination:  Movements are Fluid and Coordinated: No   Hand Function:  Gross Grasp: Functional  Extremities: RUE   RUE : Within Functional Limits and LUE   LUE: Within Functional Limits        Outcome Measures:[facility] Daily Activity  Putting on and taking off regular lower body clothing: A lot  Bathing (including washing, rinsing, drying): A lot  Putting on and taking off regular upper body clothing: A lot  Toileting, which includes using toilet, bedpan or urinal: A lot  Taking care of personal grooming such as brushing teeth: A lot  Eating Meals: A little  Daily Activity - Total Score: 13        Education Documentation  No documentation found.  Education Comments  No comments found.        OP EDUCATION:       Goals:  Encounter Problems       Encounter Problems  (Active)       ADLs       Patient will perform UB and LB bathing  with minimal assist  level of assistance . (Not Progressing)       Start:  10/18/23               BALANCE       Patientt will maintain static standing balance during ADL task with minimal assist  level of assistance drop down in order to demonstrate decreased risk of falling and improved postural control. (Not Progressing)       Start:  10/18/23               EXERCISE/STRENGTHENING       Patient will be educated on BUE HEP for increased ADL performance. (Not Progressing)       Start:  10/18/23               MOBILITY       Patient will perform Functional mobility min Household distances/Community Distances with contact guard assist level of assistance and front wheeled walker in order to improve safety and functional mobility. (Not Progressing)       Start:  10/18/23                        STG - Patient will transfer sit to and from stand (Not Progressing)       Start:  10/18/23    Expected End:  11/01/23       FWW MIN X 2

## 2023-10-18 NOTE — CARE PLAN
Problem: Skin  Goal: Decreased wound size/increased tissue granulation at next dressing change  Outcome: Progressing  Goal: Participates in plan/prevention/treatment measures  Outcome: Progressing  Goal: Prevent/manage excess moisture  Outcome: Progressing  Flowsheets (Taken 10/18/2023 0211)  Prevent/manage excess moisture: Monitor for/manage infection if present  Goal: Prevent/minimize sheer/friction injuries  Outcome: Progressing  Flowsheets (Taken 10/18/2023 0211)  Prevent/minimize sheer/friction injuries:   HOB 30 degrees or less   Turn/reposition every 2 hours/use positioning/transfer devices  Goal: Promote/optimize nutrition  Outcome: Progressing  Goal: Promote skin healing  Outcome: Progressing  Flowsheets (Taken 10/18/2023 0211)  Promote skin healing: Turn/reposition every 2 hours/use positioning/transfer devices     Problem: Fall/Injury  Goal: Not fall by end of shift  Outcome: Progressing  Goal: Be free from injury by end of the shift  Outcome: Progressing  Goal: Verbalize understanding of personal risk factors for fall in the hospital  Outcome: Progressing  Goal: Verbalize understanding of risk factor reduction measures to prevent injury from fall in the home  Outcome: Progressing  Goal: Use assistive devices by end of the shift  Outcome: Progressing  Goal: Pace activities to prevent fatigue by end of the shift  Outcome: Progressing     Problem: Pain  Goal: Takes deep breaths with improved pain control throughout the shift  Outcome: Progressing  Goal: Turns in bed with improved pain control throughout the shift  Outcome: Progressing  Goal: Walks with improved pain control throughout the shift  Outcome: Progressing  Goal: Performs ADL's with improved pain control throughout shift  Outcome: Progressing  Goal: Participates in PT with improved pain control throughout the shift  Outcome: Progressing  Goal: Free from opioid side effects throughout the shift  Outcome: Progressing  Goal: Free from acute  confusion related to pain meds throughout the shift  Outcome: Progressing       The clinical goals for the shift include remain free from falls

## 2023-10-18 NOTE — DISCHARGE SUMMARY
Discharge Diagnosis  Fall (on) (from) other stairs and steps, initial encounter    Hospital Course  Patient is a 78-year-old female who presented to [facility] after fall.  Patient was admitted for observation.  She was started on regular diet.  Patient's pain is well controlled prior to discharge.  Discussion was had with patient and family regarding rehab, and they stated that they would rather take patient home.  Patient was instructed to call her PCP or come to the ED if having chest pain, trouble breathing, numbness/tingling, or any other concerns.  She understood and was in agreement with the plan.    Discharge diet: Regular  Discharge activity: As tolerated.  Discharge follow-up: PCP to be seen within 1 to 2 weeks.  Dr. Agustin to be seen as needed.    Patient was safe for discharge and was discharged home in stable condition.    Pertinent Physical Exam At Time of Discharge  Physical Exam  Gen: NAD.  A&Ox3  HEENT: NC/AT.  Moist mucous membranes.  Neck: Normal range of motion.  CV: Regular rate.  Chest: Normal chest rise.  Normal respiratory effort.  Some tenderness to palpation on the right posterolateral chest wall, improved from yesterday.  Abdomen: Soft.  NT/ND.  No rigidity or guarding.  Extremities: No edema.  Ecchymoses noted on medial surface of R arm.     Home Medications     Medication List      ASK your doctor about these medications     amLODIPine 5 mg tablet; Commonly known as: Norvasc   atorvastatin 10 mg tablet; Commonly known as: Lipitor   azelastine 137 mcg (0.1 %) nasal spray; Commonly known as: Astelin   carbidopa-levodopa  mg tablet; Commonly known as: Sinemet   fluticasone 50 mcg/actuation nasal spray; Commonly known as: Flonase   furosemide 20 mg tablet; Commonly known as: Lasix   guaiFENesin 600 mg 12 hr tablet; Commonly known as: Mucinex   ipratropium 42 mcg (0.06 %) nasal spray; Commonly known as: Atrovent   Jantoven 2.5 mg tablet; Generic drug: warfarin   metOLazone 2.5 mg tablet;  Commonly known as: Zaroxolyn   metoprolol succinate XL 50 mg 24 hr tablet; Commonly known as: Toprol-XL   mupirocin 2 % ointment; Commonly known as: Bactroban   Myrbetriq 25 mg tablet extended release 24 hr 24 hr tablet; Generic   drug: mirabegron   oxybutynin XL 10 mg 24 hr tablet; Commonly known as: Ditropan-XL   pilocarpine 5 mg tablet; Commonly known as: Salagen   trospium 60 mg 24 hour capsule; Commonly known as: [place] XR       Outpatient Follow-Up  No future appointments.    George Saleem, PGY4  General Surgery

## 2023-10-18 NOTE — PROGRESS NOTES
"Bethany Espinoza is a 78 y.o. female on day 0 of admission presenting with Fall (on) (from) other stairs and steps, initial encounter.    Subjective   Feels that her pain is somewhat better, but still had a \"rough night\".  Has not gotten out of bed.  Is able to take deep breaths and using incentive spirometer when encouraged.  Tolerating diet without any nausea or vomiting.       Objective     Physical Exam  Neurologic: CN II-XII grossly intact.  Sensation and motor function intact in bilateral upper and lower extremities.  HEENT:              Head: no abrasions, lacerations or contusions. Skull intact. Midface stable to palpation              Eyes: equal round and reactive to light              Ears: no hemotympanum. No abrasions or lacerations              Nose: no abrasions or lacerations. No blood per nares              Oral Cavity: no blood noted. Dentition intact  Neck: soft, supple. Trachea midline. No abrasions, lacerations or contusions. No crepitus  Pulmonary: clear to auscultation bilaterally. External: no abrasions, lacerations or contusions. No crepitus.  Notable tenderness to palpation along right posterior lateral chest wall corresponding to injury area with some overlying hyperemia.  Cardiovascular:               Pulses: palpable bilateral radial, femoral, dorsalis pedis and posterior tibial arteries  Abdomen: soft, nondistended, nontender to palpation. No abrasions, lacerations or contusions  Pelvis/Perineum: pelvis stable to palpation. No pubic symphysis widening palpated. Perineum without ecchymosis  Musculoskeletal:               Back/Spine: nontender, no stepoffs. Back without abrasions, lacerations or contusions              Extremities: no abrasions, lacerations or contusions. No deformities or joint swelling    Last Recorded Vitals  Blood pressure 128/79, pulse 59, temperature 36.6 °C (97.9 °F), resp. rate 18, height 1.575 m (5' 2\"), weight 88 kg (194 lb), SpO2 92 %.  Intake/Output last 3 " "Shifts:  I/O last 3 completed shifts:  In: - (0 mL/kg)   Out: 550 (6.3 mL/kg) [Urine:550 (0.2 mL/kg/hr)]  Weight: 88 kg          Assessment/Plan   Principal Problem:    Fall (on) (from) other stairs and steps, initial encounter  Active Problems:    Fall (on)(from) incline, initial encounter    Blunt chest trauma    Chronic anticoagulation    Plan.    From a pain control standpoint, she is nervous about taking oral narcotics as she does not \"want to become addicted\".  However, she does not seem to have a problem with the IV morphine.  She actually has just been taking Tylenol.  Her pain seems like it could be better controlled if she takes some of the narcotic medications.  Had long discussion with the patient and her family yesterday regarding managing her pain (not trying to make her pain-free) in order to allow her to get up and move around more.  Patient is still hesitant to use more than just Tylenol and a lidocaine patch.  She has not been seen by PT or OT yet.  Therefore, would like to keep the patient in-house until she is a bit more mobile before returning back to home.  Patient and family do not want to go to a rehab facility.  Family is in support of her going home on discharge.  LEIGHTON Monterroso was contacted by the emergency room yesterday for a medicine consult for this patient.  However, no formal note was written.  Noted that no order was placed, so an order for medicine consultation placed today and provider notified.  All management of medications and anticoagulation per IMS.        Nuria Agustin MD      "

## 2023-10-18 NOTE — PROGRESS NOTES
10/18/23 1106   Discharge Planning   Living Arrangements Spouse/significant other   Support Systems Spouse/significant other   Assistance Needed Independent with walker   Type of Residence Private residence   Home or Post Acute Services In home services   Type of Home Care Services Home PT;Home OT;Home SLP;Home nursing visits   Patient expects to be discharged to: Home   Financial Resource Strain   How hard is it for you to pay for the very basics like food, housing, medical care, and heating? Not hard   Housing Stability   In the last 12 months, was there a time when you were not able to pay the mortgage or rent on time? N   In the last 12 months, was there a time when you did not have a steady place to sleep or slept in a shelter (including now)? N   Transportation Needs   In the past 12 months, has lack of transportation kept you from medical appointments or from getting medications? no   In the past 12 months, has lack of transportation kept you from meetings, work, or from getting things needed for daily living? No     Patients daughters provide assistance when needed. Patient recently used [facility] for ST and PT needs but care was ended a week ago. Patient is open to [facility] again if recommended. PCP is Tc Oropeza. PT/ OT Pending.

## 2023-10-18 NOTE — PROGRESS NOTES
Physical Therapy    Physical Therapy Evaluation    Patient Name: Bethany Espinoza  MRN: [id number]  Today's Date: 10/18/2023   Time Calculation  Start Time: 1530  Stop Time: 1604  Time Calculation (min): 34 min    Assessment/Plan   PT Assessment  PT Assessment Results: Decreased strength, Decreased endurance, Impaired balance, Decreased mobility, Decreased coordination, Decreased safety awareness  Rehab Prognosis: Fair  Evaluation/Treatment Tolerance: Patient limited by fatigue, Patient limited by pain  Medical Staff Made Aware: Yes  Strengths: Support of extended family/friends  End of Session Communication: Bedside nurse  Assessment Comment: PT. W/ DECREASED ENDDURANCE, STAREGTH NOT ABLE TO AMB  W/O MOD A O NLY AMB 2 FT FWW AND POOR BALCNE, HIGH FALLRISK, NEEDS SKILELD REHAB  End of Session Patient Position: Bed, 3 rail up, Alarm on (CALLLIGHTIN REACH, SET UP FOR LUNCH)  IP OR SWING BED PT PLAN  Inpatient or Swing Bed: Inpatient  PT Plan  Treatment/Interventions: Bed mobility, Transfer training, Gait training, Strengthening, Endurance training  PT Plan: Skilled PT  PT Frequency: 3 times per week  PT Discharge Recommendations: Moderate intensity level of continued care  Equipment Recommended upon Discharge:  (TBD)  PT Recommended Transfer Status: Assist x2 (FWW)      Subjective   General Visit Information:  General  Reason for Referral: TRAUMA PT  Referred By: [facility]  Past Medical History Relevant to Rehab: FALL; DX: FALL PAIN; HX: TACHYBRADY SYNDROME, AFIB, DHF, AV VALVE D/O, HTN, MV D/O. CVA, PROGRESSIVE SUPRANUCLEAR PALSY, FREDY TKR, CTR, FALLS  Family/Caregiver Present: No  Co-Treatment: OT  Prior to Session Communication: Bedside nurse  Patient Position Received: Bed, 3 rail up, Alarm on (IV PURE WICK)  General Comment:  (PT. REPSORTS THAT IT TAKES A LONG TIME FOR HER TO FOLLOW THROUGH W/  MOBILITY)  Home Living:  Home Living  Home Living Comments: SPOUSE IN 1 LEVEL HOME 6 STEPS W/ RAIL TO ENTER, AMB W/ WHEELED  WALKER, TUB SHOWER STNADS TO BATHE, GRAB BARS S FOR BATHING, A FOR LB DRESSING, SPOUSE/DTRS DO CHORES, DTR DRIVES, SLEEPS IN LIFT CHAIR USES LIFT FUNCTION TO STAND       Precautions:  Precautions  Hearing/Visual Limitations: [place]  Medical Precautions: Fall precautions, Neuro precautions         Objective   Pain:  Pain Assessment  Pain Assessment: 0-10  Pain Score: 3  Pain Type: Acute pain  Pain Location:  (R BACK, NOTED PT. HAS 2 SMALL BRUISES POSTERIOR THROACIC AREA)  Cognition:  Cognition  Overall Cognitive Status: Impaired  Attention:  (MILD DCREASED)  Processing Speed: Delayed    General Assessments:  General Observation  General Observation: HODS TRUN RIGID                  Sensation  Sensation Comment: NO C/O    Strength  Strength Comments: DEMOS 3-/5 IN LEGS, ROM  WFL           Coordination  Coordination Comment: FLAT AFFECT, MILD RIGIDITY IN LEGS, MOD IN TRUNK         Static Sitting Balance  Static Sitting-Comment/Number of Minutes: FAIR-  Dynamic Sitting Balance  Dynamic Sitting-Comments: FAIR- SEATED EOB LEANS BACKWARDS NEEDS CGA/MIN A TO BRING TRUNK FORWARD    Dynamic Standing Balance  Dynamic Standing-Comments: POOR, RETRO LEAN  Functional Assessments:  Bed Mobility  Bed Mobility:  (MAX X2 SUPINE<>SIT & TO SCOOT UP IN BED)    Transfers  Transfer:  (SIT<.STAND MOD X2 FWW, TAKES SUPPOT ON BACK OF LEGS FROM BED, MOD RETRO LEAN CONSTANT FIRIGN OF DF WHEN STANDING)    Ambulation/Gait Training  Ambulation/Gait Training Performed:  (AMB 2 FT TO L TOWARDS HOB FWW MOD X2, NEEDS VC TO MOBILIZE AND A TO MOVE FWW TO FACILITATE MOBITLY, TAKES SUPPORT ON BACK OF LEGS TO STABILIZE GAIT, MOD RETROLEAN)                      Outcome Measures:  [facility] Basic Mobility  Turning from your back to your side while in a flat bed without using bedrails: Total  Moving from lying on your back to sitting on the side of a flat bed without using bedrails: Total  Moving to and from bed to chair (including a wheelchair): Total  Standing up  from a chair using your arms (e.g. wheelchair or bedside chair): Total  To walk in hospital room: Total  Climbing 3-5 steps with railing: Total  Basic Mobility - Total Score: 6    Encounter Problems       Encounter Problems (Active)       Mobility       STRENGTHENING (Not Progressing)       Start:  10/18/23    Expected End:  11/07/23       20 + REPS AROM/RROM EX INCREASING STRENGTH TO PROGRESS OOB ACTIVITY            Transfers       STG - Transfer from bed <> chair (Not Progressing)       Start:  10/18/23    Expected End:  11/01/23       FW WMIN X2 A         STG - Patient to transfer to and from sit to supine (Not Progressing)       Start:  10/18/23    Expected End:  11/01/23       MIN X1-2 A         STG - Patient will transfer sit to and from stand (Not Progressing)       Start:  10/18/23    Expected End:  11/01/23       FWW MIN X 2                Education Documentation  Mobility Training, taught by Kari Steel PT at 10/18/2023  6:25 PM.  Learner: Patient  Readiness: Acceptance  Method: Explanation  Response: Verbalizes Understanding  Comment: IMPORTANCE OF MOBITY IN RECOVERY, MOBITY SAFETY    Education Comments  No comments found.

## 2023-10-18 NOTE — PROGRESS NOTES
Social work consult placed for SNF placement. SW reviewed pt's chart and communicated with TCC. No SW needs foreseen at this time. SW signing off; available upon request.    Kane Hutchinson, MSW, LSW ([id number])   Care Transitions

## 2023-10-19 VITALS
SYSTOLIC BLOOD PRESSURE: 165 MMHG | BODY MASS INDEX: 35.7 KG/M2 | WEIGHT: 194 LBS | HEART RATE: 61 BPM | TEMPERATURE: 98 F | RESPIRATION RATE: 17 BRPM | OXYGEN SATURATION: 95 % | HEIGHT: 62 IN | DIASTOLIC BLOOD PRESSURE: 83 MMHG

## 2023-10-19 LAB
INR PPP: 3.4 (ref 0.9–1.1)
PROTHROMBIN TIME: 39.4 SECONDS (ref 9.8–12.8)

## 2023-10-19 PROCEDURE — 36415 COLL VENOUS BLD VENIPUNCTURE: CPT | Performed by: PHYSICIAN ASSISTANT

## 2023-10-19 PROCEDURE — 2500000004 HC RX 250 GENERAL PHARMACY W/ HCPCS (ALT 636 FOR OP/ED): Performed by: INTERNAL MEDICINE

## 2023-10-19 PROCEDURE — G0378 HOSPITAL OBSERVATION PER HR: HCPCS

## 2023-10-19 PROCEDURE — 2500000005 HC RX 250 GENERAL PHARMACY W/O HCPCS: Performed by: SURGERY

## 2023-10-19 PROCEDURE — 2500000001 HC RX 250 WO HCPCS SELF ADMINISTERED DRUGS (ALT 637 FOR MEDICARE OP): Performed by: SURGERY

## 2023-10-19 PROCEDURE — 97112 NEUROMUSCULAR REEDUCATION: CPT | Mod: GO,CO

## 2023-10-19 PROCEDURE — 85610 PROTHROMBIN TIME: CPT | Performed by: PHYSICIAN ASSISTANT

## 2023-10-19 PROCEDURE — 99233 SBSQ HOSP IP/OBS HIGH 50: CPT | Performed by: PHYSICIAN ASSISTANT

## 2023-10-19 PROCEDURE — 97110 THERAPEUTIC EXERCISES: CPT | Mod: GP,CQ

## 2023-10-19 PROCEDURE — 2500000004 HC RX 250 GENERAL PHARMACY W/ HCPCS (ALT 636 FOR OP/ED): Performed by: SURGERY

## 2023-10-19 PROCEDURE — 97116 GAIT TRAINING THERAPY: CPT | Mod: GP,CQ

## 2023-10-19 PROCEDURE — 99238 HOSP IP/OBS DSCHRG MGMT 30/<: CPT | Performed by: SURGERY

## 2023-10-19 RX ORDER — QUETIAPINE FUMARATE 25 MG/1
12.5 TABLET, FILM COATED ORAL ONCE
Status: COMPLETED | OUTPATIENT
Start: 2023-10-19 | End: 2023-10-19

## 2023-10-19 RX ADMIN — QUETIAPINE FUMARATE 12.5 MG: 25 TABLET ORAL at 06:41

## 2023-10-19 RX ADMIN — FLUTICASONE PROPIONATE 2 SPRAY: 50 SPRAY, METERED NASAL at 08:29

## 2023-10-19 RX ADMIN — CARBIDOPA AND LEVODOPA 1 TABLET: 25; 100 TABLET ORAL at 08:29

## 2023-10-19 RX ADMIN — FUROSEMIDE 20 MG: 20 TABLET ORAL at 08:29

## 2023-10-19 RX ADMIN — ACETAMINOPHEN 975 MG: 325 TABLET ORAL at 08:33

## 2023-10-19 RX ADMIN — LIDOCAINE 1 PATCH: 4 PATCH TOPICAL at 08:29

## 2023-10-19 RX ADMIN — NYSTATIN 1 APPLICATION: 100000 POWDER TOPICAL at 08:31

## 2023-10-19 RX ADMIN — METHOCARBAMOL 250 MG: 500 TABLET ORAL at 08:29

## 2023-10-19 RX ADMIN — METOLAZONE 2.5 MG: 5 TABLET ORAL at 08:29

## 2023-10-19 RX ADMIN — OXYBUTYNIN CHLORIDE 5 MG: 5 TABLET ORAL at 08:29

## 2023-10-19 RX ADMIN — ACETAMINOPHEN 975 MG: 325 TABLET ORAL at 00:33

## 2023-10-19 RX ADMIN — METOPROLOL SUCCINATE 50 MG: 50 TABLET, EXTENDED RELEASE ORAL at 08:29

## 2023-10-19 RX ADMIN — AZELASTINE HYDROCHLORIDE 1 SPRAY: 137 SPRAY, METERED NASAL at 08:31

## 2023-10-19 ASSESSMENT — COGNITIVE AND FUNCTIONAL STATUS - GENERAL
PERSONAL GROOMING: A LOT
HELP NEEDED FOR BATHING: A LOT
WALKING IN HOSPITAL ROOM: TOTAL
STANDING UP FROM CHAIR USING ARMS: A LOT
EATING MEALS: A LITTLE
MOBILITY SCORE: 10
MOVING TO AND FROM BED TO CHAIR: A LOT
DRESSING REGULAR UPPER BODY CLOTHING: A LOT
MOVING TO AND FROM BED TO CHAIR: A LOT
DRESSING REGULAR UPPER BODY CLOTHING: A LOT
DAILY ACTIVITIY SCORE: 10
MOVING FROM LYING ON BACK TO SITTING ON SIDE OF FLAT BED WITH BEDRAILS: A LOT
STANDING UP FROM CHAIR USING ARMS: A LOT
TOILETING: A LOT
TOILETING: TOTAL
HELP NEEDED FOR BATHING: TOTAL
DAILY ACTIVITIY SCORE: 13
TURNING FROM BACK TO SIDE WHILE IN FLAT BAD: A LOT
DRESSING REGULAR LOWER BODY CLOTHING: TOTAL
PERSONAL GROOMING: A LOT
MOVING FROM LYING ON BACK TO SITTING ON SIDE OF FLAT BED WITH BEDRAILS: A LOT
WALKING IN HOSPITAL ROOM: A LOT
CLIMB 3 TO 5 STEPS WITH RAILING: A LOT
MOBILITY SCORE: 12
TURNING FROM BACK TO SIDE WHILE IN FLAT BAD: A LOT
DRESSING REGULAR LOWER BODY CLOTHING: A LOT
EATING MEALS: A LITTLE
CLIMB 3 TO 5 STEPS WITH RAILING: TOTAL

## 2023-10-19 ASSESSMENT — ENCOUNTER SYMPTOMS
DIZZINESS: 0
ABDOMINAL PAIN: 0
VOMITING: 0
SORE THROAT: 0
CHEST TIGHTNESS: 0
FLANK PAIN: 0
LIGHT-HEADEDNESS: 0
CHILLS: 0
WEAKNESS: 0
SHORTNESS OF BREATH: 0
BRUISES/BLEEDS EASILY: 0
BLOOD IN STOOL: 0
FACIAL SWELLING: 0
DYSURIA: 0
NAUSEA: 0
PALPITATIONS: 0
EYE PAIN: 0
HEADACHES: 0
FEVER: 0
NUMBNESS: 0
HALLUCINATIONS: 0
FREQUENCY: 0
JOINT SWELLING: 0
COUGH: 0
DIAPHORESIS: 0
FATIGUE: 0
APPETITE CHANGE: 0
DIARRHEA: 0
BACK PAIN: 1
HEMATURIA: 0
WHEEZING: 0
TROUBLE SWALLOWING: 0
CONSTIPATION: 0
WOUND: 0

## 2023-10-19 ASSESSMENT — PAIN SCALES - GENERAL
PAINLEVEL_OUTOF10: 0 - NO PAIN

## 2023-10-19 ASSESSMENT — PAIN - FUNCTIONAL ASSESSMENT
PAIN_FUNCTIONAL_ASSESSMENT: 0-10
PAIN_FUNCTIONAL_ASSESSMENT: 0-10

## 2023-10-19 NOTE — CARE PLAN
Problem: Skin  Goal: Decreased wound size/increased tissue granulation at next dressing change  Outcome: Progressing  Goal: Participates in plan/prevention/treatment measures  Outcome: Progressing  Goal: Prevent/manage excess moisture  Outcome: Progressing  Goal: Prevent/minimize sheer/friction injuries  Outcome: Progressing  Goal: Promote/optimize nutrition  Outcome: Progressing  Goal: Promote skin healing  Outcome: Progressing     Problem: Fall/Injury  Goal: Not fall by end of shift  Outcome: Progressing  Goal: Be free from injury by end of the shift  Outcome: Progressing  Goal: Verbalize understanding of personal risk factors for fall in the hospital  Outcome: Progressing  Goal: Verbalize understanding of risk factor reduction measures to prevent injury from fall in the home  Outcome: Progressing  Goal: Use assistive devices by end of the shift  Outcome: Progressing  Goal: Pace activities to prevent fatigue by end of the shift  Outcome: Progressing     Problem: Pain  Goal: Takes deep breaths with improved pain control throughout the shift  Outcome: Progressing  Goal: Turns in bed with improved pain control throughout the shift  Outcome: Progressing  Goal: Walks with improved pain control throughout the shift  Outcome: Progressing  Goal: Performs ADL's with improved pain control throughout shift  Outcome: Progressing  Goal: Participates in PT with improved pain control throughout the shift  Outcome: Progressing  Goal: Free from opioid side effects throughout the shift  Outcome: Progressing  Goal: Free from acute confusion related to pain meds throughout the shift  Outcome: Progressing   The patient's goals for the shift include      The clinical goals for the shift include remain free from falls

## 2023-10-19 NOTE — CARE PLAN
Problem: ADLs  Goal: Patient will perform UB and LB bathing  with minimal assist  level of assistance .  Outcome: Not Progressing

## 2023-10-19 NOTE — PROGRESS NOTES
Physical Therapy    Physical Therapy Treatment    Patient Name: Bethany Espinoza  MRN: [id number]  Today's Date: 10/19/2023  Time Calculation  Start Time: 1304  Stop Time: 1327  Time Calculation (min): 23 min       Assessment/Plan   PT Assessment  PT Assessment Results: Decreased range of motion, Decreased endurance, Decreased strength, Decreased mobility, Impaired balance, Decreased safety awareness  Rehab Prognosis: Fair  Evaluation/Treatment Tolerance: Patient limited by fatigue, Patient limited by pain  Medical Staff Made Aware: Yes  Strengths: Support of extended family/friends  End of Session Communication: Bedside nurse, PCT/NA/CTA  Assessment Comment: patient progressed to gait training, but then had large LOB due to retrolean with trying to change direction required max assist x2 for LOB and retro lean, chair pulled up behind her  End of Session Patient Position: Up in chair, Alarm on  PT Plan  Inpatient/Swing Bed or Outpatient: Inpatient  PT Plan  Treatment/Interventions: Bed mobility, Transfer training, Gait training, Strengthening, Endurance training, Range of motion, Therapeutic exercise, Therapeutic activity  PT Plan: Skilled PT  PT Frequency: 3 times per week  PT Discharge Recommendations: Moderate intensity level of continued care  Equipment Recommended upon Discharge:  (TBD)  PT Recommended Transfer Status: Assist x2 (FWW)      General Visit Information:   PT  Visit  PT Received On: 10/19/23  Response to Previous Treatment: Patient with no complaints from previous session.       Subjective   Precautions:  Precautions  Hearing/Visual Limitations: [place]  Medical Precautions: Fall precautions, Neuro precautions  Vital Signs:       Objective   Pain:  Pain Assessment  Pain Assessment: 0-10  Pain Score: 0 - No pain  Pain Type:  (no complaints of pain)  Cognition:  Cognition  Overall Cognitive Status: Impaired  Orientation Level: Disoriented to time, Disoriented to situation  Postural Control:      Extremity/Trunk Assessments:    Activity Tolerance:  Activity Tolerance  Endurance: Tolerates 10 - 20 min exercise with multiple rests  Treatments:  Therapeutic Exercise  Therapeutic Exercise Performed: Yes  Therapeutic Exercise Activity 1: FREDY LE ankle pumps, heelslides , hip abduction, quad sets, glute sets 20 reps each         Bed Mobility  Bed Mobility: Yes    Ambulation/Gait Training  Ambulation/Gait Training Performed: Yes  Ambulation/Gait Training 1  Surface 1: Level tile  Device 1: Rolling walker  Assistance 1: Maximum assistance  Quality of Gait 1: Narrow base of support  Comments/Distance (ft) 1: gait triaing 8 feet forward with min assit x2 , intially but then progressed to max assist x2 and required chair to be pulled up behind her.  Transfers  Transfer: Yes  Transfer 1  Transfer From 1: Bed to  Transfer to 1: Chair with arms  Transfer Device 1: Walker  Transfer Level of Assistance 1: Moderate assistance, +2    Outcome Measures:  [facility] Basic Mobility  Turning from your back to your side while in a flat bed without using bedrails: A lot  Moving from lying on your back to sitting on the side of a flat bed without using bedrails: A lot  Moving to and from bed to chair (including a wheelchair): A lot  Standing up from a chair using your arms (e.g. wheelchair or bedside chair): A lot  To walk in hospital room: A lot  Climbing 3-5 steps with railing: A lot  Basic Mobility - Total Score: 12    Education Documentation  Mobility Training, taught by Aurora Cullen PTA at 10/19/2023  1:43 PM.  Learner: Patient  Readiness: Acceptance  Method: Explanation  Response: Verbalizes Understanding, Needs Reinforcement  Comment: safety needs reinforced    Education Comments  No comments found.        OP EDUCATION:       Encounter Problems       Encounter Problems (Active)       Mobility       STRENGTHENING (Progressing)       Start:  10/18/23    Expected End:  11/07/23       20 + REPS AROM/RROM EX INCREASING STRENGTH TO  PROGRESS OOB ACTIVITY            Transfers       STG - Transfer from bed <> chair (Progressing)       Start:  10/18/23    Expected End:  11/01/23       FW WMIN X2 A         STG - Patient to transfer to and from sit to supine (Progressing)       Start:  10/18/23    Expected End:  11/01/23       MIN X1-2 A         STG - Patient will transfer sit to and from stand (Progressing)       Start:  10/18/23    Expected End:  11/01/23       FWW MIN X 2

## 2023-10-19 NOTE — PROGRESS NOTES
Occupational Therapy    OT Treatment    Patient Name: Bethany Espinoza  MRN: [id number]  Today's Date: 10/19/2023  Time Calculation  Start Time: 1315  Stop Time: 1329  Time Calculation (min): 14 min         Assessment:  OT Assessment: Pt has diff with transitional movement and static sitting and standing due to retrolean. Pt makes no attempts to correct these losses.  End of Session Communication: Bedside nurse  End of Session Patient Position: Up in chair, Alarm on       Plan:  Treatment Interventions: Neuromuscular reeducation, Endurance training, Functional transfer training  OT Frequency: 3 times per week  Treatment Interventions: Neuromuscular reeducation, Endurance training, Functional transfer training  Subjective           General:  OT Received On: 10/19/23         Pain:  Pain Assessment  Pain Assessment: 0-10  Pain Score: 0 - No pain  Objective       Functional Standing Tolerance:  Time: static staning for 1 min  Activity: BUE supported standing with MIN A x2 for balance  Functional Standing Tolerance Comments: pt has LOB posteriorly without assist    Bed Mobility/Transfers: Bed Mobility  Bed Mobility: Yes  Bed Mobility 1  Bed Mobility 1: Supine to sitting  Level of Assistance 1: Moderate assistance (assist of 2)  Bed Mobility Comments 1: max cues  Transfers  Transfer: Yes  Transfer 1  Technique 1: Sit to stand, Stand to sit  Transfer Device 1: Walker  Transfer Level of Assistance 1: Moderate assistance, +2, Moderate verbal cues                Therapy/Activity:    Therapeutic Activity  Therapeutic Activity Performed: Yes  Therapeutic Activity 1: static sitting and static standing     Balance/Neuromuscular Re-Education  Balance/Neuromuscular Re-Education Activity Performed: Yes    Strength:  Strength Comments: generalized weakness (UEs are rigid)    Other Activity:       Outcome Measures:[facility] Daily Activity  Putting on and taking off regular lower body clothing: Total  Bathing (including washing, rinsing,  drying): Total  Putting on and taking off regular upper body clothing: A lot  Toileting, which includes using toilet, bedpan or urinal: Total  Taking care of personal grooming such as brushing teeth: A lot  Eating Meals: A little  Daily Activity - Total Score: 10  Education Documentation  No documentation found.  Education Comments  No comments found.        EDUCATION:  Education  Individual(s) Educated: Patient  Education Provided: Fall precautons  Patient Response to Education:  (Pt will need follow up)    Goals:Occupational Therapy    Patient Name: Bethany Espinoza  MRN: [id number]  Today's Date: 10/19/2023  Time Calculation  Start Time: 1315  Stop Time: 1329  Time Calculation (min): 14 min        Assessment:  OT Assessment: Pt has diff with transitional movement and static sitting and standing due to retrolean. Pt makes no attempts to correct these losses.  Prognosis: Fair  Evaluation/Treatment Tolerance: Patient limited by fatigue  Medical Staff Made Aware: Yes  End of Session Communication: Bedside nurse  End of Session Patient Position: Up in chair, Alarm on     Plan:  Treatment Interventions: Neuromuscular reeducation, Endurance training, Functional transfer training  OT Frequency: 3 times per week  Treatment Interventions: Neuromuscular reeducation, Endurance training, Functional transfer training    Subjective     General:  General  Co-Treatment: PT  Co-Treatment Reason: co tx due to pts level of care and safety  Prior to Session Communication: Bedside nurse  Patient Position Received: Bed, 3 rail up  General Comment: Pt very slow to respond       Pain:  Pain Assessment  Pain Assessment: 0-10  Pain Score: 0 - No pain    Objective        Activity Tolerance:poor     Bed Mobility/Transfers: Bed Mobility  Bed Mobility: Yes  Bed Mobility 1  Bed Mobility 1: Supine to sitting  Level of Assistance 1: Moderate assistance (assist of 2)  Bed Mobility Comments 1: max cues              Coordination:pt movements are stiff  and poorly coordinate     Extremities: BUEs are rigid  Outcome Measures:[facility] Daily Activity  Putting on and taking off regular lower body clothing: Total  Bathing (including washing, rinsing, drying): Total  Putting on and taking off regular upper body clothing: A lot  Toileting, which includes using toilet, bedpan or urinal: Total  Taking care of personal grooming such as brushing teeth: A lot  Eating Meals: A little  Daily Activity - Total Score: 10        Education Documentation  No documentation found.  Education Comments  No comments found.        OP EDUCATION:  Education  Individual(s) Educated: Patient  Education Provided: Fall precautons  Patient Response to Education:  (Pt will need follow up)    Goals:  Encounter Problems       Encounter Problems (Active)       ADLs       Patient will perform UB and LB bathing  with minimal assist  level of assistance . (Not Progressing)       Start:  10/18/23               BALANCE       Patientt will maintain static standing balance during ADL task with minimal assist  level of assistance drop down in order to demonstrate decreased risk of falling and improved postural control. (Not Progressing)       Start:  10/18/23               EXERCISE/STRENGTHENING       Patient will be educated on BUE HEP for increased ADL performance. (Not Progressing)       Start:  10/18/23               MOBILITY       Patient will perform Functional mobility min Household distances/Community Distances with contact guard assist level of assistance and front wheeled walker in order to improve safety and functional mobility. (Not Progressing)       Start:  10/18/23                        STG - Patient will transfer sit to and from stand (Not Progressing)       Start:  10/18/23    Expected End:  11/01/23       FWW MIN X 2

## 2023-10-19 NOTE — PROGRESS NOTES
Bethany Espinoza is a 78 y.o. female on day 0 of admission presenting with Fall (on) (from) other stairs and steps, initial encounter.      Subjective Bethany Espinoza is a 78 y.o. female with progressive supranuclear palsy, atrial fibrillation on coumadin, HFpEF who presented to ED 10/17/23 s/p fall. Admitted to trauma service for pain control, no significant injuries noted. Family wishes for discharge planning to home with homecare. Patient hoping for discharge to home today. She denies any acute complaints aside form right rib pain. Refuses oral narcotics but will take IV morphine  10/19/23: No acute events overnight. Vitals stable, /83 this morning- before meds. No labs this AM, INR 3.4- appreciate pharmacy dosing recommendations.          Review of Systems   Constitutional:  Negative for appetite change, chills, diaphoresis, fatigue and fever.   HENT:  Negative for congestion, ear pain, facial swelling, hearing loss, nosebleeds, sore throat, tinnitus and trouble swallowing.    Eyes:  Negative for pain.   Respiratory:  Negative for cough, chest tightness, shortness of breath and wheezing.    Cardiovascular:  Negative for chest pain, palpitations and leg swelling.   Gastrointestinal:  Negative for abdominal pain, blood in stool, constipation, diarrhea, nausea and vomiting.   Genitourinary:  Negative for dysuria, flank pain, frequency, hematuria and urgency.   Musculoskeletal:  Positive for back pain (R posterior ribs). Negative for joint swelling.   Skin:  Negative for rash and wound.   Neurological:  Negative for dizziness, syncope, weakness, light-headedness, numbness and headaches.   Hematological:  Does not bruise/bleed easily.   Psychiatric/Behavioral:  Negative for behavioral problems, hallucinations and suicidal ideas.           Objective     Last Recorded Vitals  /83   Pulse 61   Temp 36.7 °C (98 °F)   Resp 17   Wt 88 kg (194 lb)   SpO2 95%     Image Results  XR femur 2 VW  bilateral    Result Date: 10/17/2023  Interpreted By:  Jesus Rivers, STUDY: XR FEMUR 2 VW BILATERAL; ;  10/17/2023 5:10 pm   INDICATION: Signs/Symptoms:s/p trauma fall.   COMPARISON: None.   ACCESSION NUMBER(S): [id number]   ORDERING CLINICIAN: RENE ASHBY   FINDINGS: 8 views of the bilateral femurs. Soft tissue swelling. No acute fracture is seen bilaterally partially imaged bilateral knee arthroplasties. No acute hardware complication is detected. Mild-to-moderate degenerative changes of the right hip and mild degenerative changes of the left hip.       No acute osseous abnormality of the imaged bilateral femurs.     MACRO: None   Signed by: Jesus Rivers 10/17/2023 5:15 PM Dictation workstation:   [id number]    CT chest abdomen pelvis w IV contrast    Result Date: 10/17/2023  Interpreted By:  Donato Huggins, STUDY: CT CHEST ABDOMEN PELVIS W IV CONTRAST;  10/17/2023 12:03 pm   INDICATION: Signs/Symptoms:fall, hit head on anticoagulation, R rib pain   COMPARISON: None.   ACCESSION NUMBER(S): [id number]   ORDERING CLINICIAN: FERNANDEZ ROBERTS   TECHNIQUE: CT of the chest, abdomen, and pelvis was performed. Contiguous axial images were obtained at  5 mm slice thickness through the chest, and at  3 mm through the abdomen and pelvis. Coronal and sagittal reconstructions at  3 mm slice thickness were performed. 90 cc Omnipaque 350 administered   FINDINGS: Body habitus limits sensitivity   CHEST: Heart size is significantly enlarged enlarged main pulmonary artery compatible pulmonary arterial hypertension. Features suggesting pulmonary edema with septal thickening as well as bronchial thickening. No pleural effusions. Small sliding hiatal hernia is identified. Pacemaker is seen. No pneumothorax No rib fracture is seen. Please note nondisplaced rib fractures can be radiographically occult     Evaluation of the abdomen and pelvis: Heterogeneous appearance of the liver. Query right heart dysfunction. No splenic or  hepatic laceration. No aneurysm. Robust vascular calcification. 5.2 cm left midpole renal cyst. There are few small bilateral renal cysts also seen. Moderate vascular calcification. Small retroperitoneal lymph nodes are identified.   Diverticulosis.   Tiny umbilical hernia containing fat. Uterus is not seen and presumed removed. Low-lying urinary bladder. No inguinal adenopathy.   Chronic wedging of L1 and T12; less than 20% loss of height at T12 and probably 25% at L1. No retropulsion.       No definite rib fracture seen. Please note demineralization does limit sensitivity. Heart size is enlarged. Features suggesting pulmonary edema and right heart dysfunction.   Diverticulosis. No diverticulitis. No evidence of intra-abdominal hemorrhage. Robust vascular calcification. Senescent changes.   A few scattered retroperitoneal lymph nodes. These are probably reactive as they are slightly increased in number although they are not pathologic in size criteria   Senescent changes including demineralization and chronic wedging of the lumbar spine   MACRO: None   Signed by: Donato Huggins 10/17/2023 12:23 PM Dictation workstation:   [id number]    XR tibia fibula right 2 views    Result Date: 10/17/2023  Interpreted By:  Dmitri Topete, STUDY: XR TIBIA FIBULA RIGHT 2 VIEWS   INDICATION: Signs/Symptoms:fall, pain.   COMPARISON: None   ACCESSION NUMBER(S): [id number]   ORDERING CLINICIAN: FERNANDEZ ROBERTS   FINDINGS: Right total knee arthroplasty grossly unremarkable. No evidence of right lower leg fracture.   Soft tissue swelling with numerous phleboliths and sheet like calcifications favoring chronic stasis.       No acute findings right lower leg.   Likely chronic venous stasis.   Signed by: Dmitri Topete 10/17/2023 12:21 PM Dictation workstation:   [id number]    XR chest 1 view    Result Date: 10/17/2023  Interpreted By:  Dmitri Topete, STUDY: XR CHEST 1 VIEW   INDICATION: Signs/Symptoms:trauma R rib pain.   COMPARISON: February 25,  2021   ACCESSION NUMBER(S): [id number]   ORDERING CLINICIAN: FERNANDEZ ROBERTS   FINDINGS: Interval development of new airspace opacities largest in the right lung base partially obscured by the pacemaker with other areas of the left upper lobe and right upper lobe as well.   Ribs not evaluated in a diagnostic manner.       New multifocal airspace disease right-greater-than-left. Findings could be multifocal pneumonia or given the history of trauma, pulmonary contusions.   Limited assessment of the chest wall provided by frontal radiography demonstrates no gross abnormality.   Signed by: Dmitri Topete 10/17/2023 12:20 PM Dictation workstation:   [id number]    CT cervical spine wo IV contrast    Result Date: 10/17/2023  Interpreted By:  Donato Huggins, STUDY: CT HEAD WO IV CONTRAST; CT CERVICAL SPINE WO IV CONTRAST;  10/17/2023 12:00 pm   INDICATION: Signs/Symptoms:fall, hit head on anticoagulation, R rib pain   COMPARISON: None.   ACCESSION NUMBER(S): [id number]; [id number]   ORDERING CLINICIAN: FERNANDEZ ROBERTS   TECHNIQUE: Axial noncontrast CT images of head with coronal and sagittal reconstructed images. Axial noncontrast CT images of the cervical spine with coronal and sagittal reconstructed images.   FINDINGS: CT HEAD:   Global volume loss. Mild chronic small vessel ischemic change. No mass effect or midline shift No hemorrhage or edema. No acute skull fracture.   Small amount of ethmoid and right sphenoid sinus mucosal thickening. Mastoid air cells are clear. Vascular calcifications are seen.   CT CERVICAL SPINE: Demineralization of the bones. No evidence of acute cervical spine fracture. Mild multilevel facet arthropathy. No high-grade central canal stenosis. No arthropathy.       Senescent changes. No evidence of acute intracranial hemorrhage. No findings of acute cervical spine fracture Mild sphenoid opacification.   Signed by: Donato Huggins 10/17/2023 12:10 PM Dictation workstation:   [id number]    CT head wo  IV contrast    Result Date: 10/17/2023  Interpreted By:  Donato Huggins, STUDY: CT HEAD WO IV CONTRAST; CT CERVICAL SPINE WO IV CONTRAST;  10/17/2023 12:00 pm   INDICATION: Signs/Symptoms:fall, hit head on anticoagulation, R rib pain   COMPARISON: None.   ACCESSION NUMBER(S): [id number]; [id number]   ORDERING CLINICIAN: FERNANDEZ ROBERTS   TECHNIQUE: Axial noncontrast CT images of head with coronal and sagittal reconstructed images. Axial noncontrast CT images of the cervical spine with coronal and sagittal reconstructed images.   FINDINGS: CT HEAD:   Global volume loss. Mild chronic small vessel ischemic change. No mass effect or midline shift No hemorrhage or edema. No acute skull fracture.   Small amount of ethmoid and right sphenoid sinus mucosal thickening. Mastoid air cells are clear. Vascular calcifications are seen.   CT CERVICAL SPINE: Demineralization of the bones. No evidence of acute cervical spine fracture. Mild multilevel facet arthropathy. No high-grade central canal stenosis. No arthropathy.       Senescent changes. No evidence of acute intracranial hemorrhage. No findings of acute cervical spine fracture Mild sphenoid opacification.   Signed by: Donato Huggins 10/17/2023 12:10 PM Dictation workstation:   [id number]       Lab Results  Results for orders placed or performed during the hospital encounter of 10/17/23 (from the past 24 hour(s))   Protime-INR   Result Value Ref Range    Protime 39.4 (H) 9.8 - 12.8 seconds    INR 3.4 (H) 0.9 - 1.1        Medications  Scheduled medications:  acetaminophen, 975 mg, oral, q8h  [Held by provider] amLODIPine, 5 mg, oral, Daily  atorvastatin, 10 mg, oral, Nightly  azelastine, 1 spray, Each Nostril, BID  carbidopa-levodopa, 1 tablet, oral, Daily  fluticasone, 2 spray, Each Nostril, Daily  furosemide, 20 mg, oral, Daily  ipratropium, 1 spray, Each Nostril, 4x daily  lidocaine, 1 patch, transdermal, Daily  methocarbamol, 250 mg, oral, q12h  metOLazone, 2.5 mg, oral,  Once per day on Mon Wed Fri  metoprolol succinate XL, 50 mg, oral, Daily  nystatin, 1 Application, Topical, BID  oxybutynin, 5 mg, oral, BID  polyethylene glycol, 17 g, oral, Daily  warfarin, 2.5 mg, oral, Daily      Continuous medications:     PRN medications:  PRN medications: morphine, naloxone, ondansetron **OR** ondansetron, oxyCODONE, oxyCODONE, oxygen     Physical Exam  Constitutional:       General: She is not in acute distress.     Appearance: Normal appearance.      Comments: alert, oriented x4. Somewhat slow to respond slightly slurred   HENT:      Head: Normocephalic and atraumatic.      Right Ear: External ear normal.      Left Ear: External ear normal.      Nose: Nose normal.      Mouth/Throat:      Mouth: Mucous membranes are moist.      Pharynx: Oropharynx is clear.   Eyes:      Extraocular Movements: Extraocular movements intact.      Conjunctiva/sclera: Conjunctivae normal.      Pupils: Pupils are equal, round, and reactive to light.   Cardiovascular:      Rate and Rhythm: Normal rate and regular rhythm.      Pulses: Normal pulses.      Heart sounds: Normal heart sounds.   Pulmonary:      Effort: Pulmonary effort is normal. No respiratory distress.      Breath sounds: Normal breath sounds. No wheezing, rhonchi or rales.   Abdominal:      General: Abdomen is flat. Bowel sounds are normal.      Palpations: Abdomen is soft.      Tenderness: There is no abdominal tenderness. There is no right CVA tenderness, left CVA tenderness, guarding or rebound.   Musculoskeletal:         General: No swelling. Normal range of motion.      Cervical back: Normal range of motion and neck supple.      Comments: Pain right posterior ribs  Spine nontender, no stepoffs. Lidocaine patch right shoulder   Skin:     General: Skin is warm and dry.      Capillary Refill: Capillary refill takes less than 2 seconds.      Findings: No lesion or rash.      Comments: Significant Venous stasis changes bilateral lower extremities     Neurological:      General: No focal deficit present.      Mental Status: She is alert and oriented to person, place, and time. Mental status is at baseline.   Psychiatric:         Mood and Affect: Mood normal.         Behavior: Behavior normal.                  Code Status  Full Code     Assessment/Plan   This patient currently has cardiac telemetry ordered; if you would like to modify or discontinue the telemetry order, click here to go to the orders activity to modify/discontinue the order.    Fall with chest wall injury  PT/OT- appears family is declining SNF despite [facility], patient reports she has  who lives in the home and daughters that are very helpful  Per trauma surgery service  Encourage IS  Plan home with homecare  Multimodal pain control with scheduled tylenol, robaxin, PRN oxycodone and morphine (patient refusing PO oxycodone)     PSP  Continues outpatient follow up  No indication of aspiration  Maintain aspiration precautions      Acute on chronic HFpEF  Resume home medications  Possibly slight exacerbation with notable pulmonary edema and elevated BNP with mild shortness of breath reported by patient- given extra 1x dose IV lasix, then resumed home medications  Cardiac diet  Not requiring oxygen     PAF on Coumadin  INR therapeutic, continue coumadin, appreciate pharmacy consult for dosing   Continue BB  Monitor on tele     Thank you for involving us in the care of this very pleasant female. We will follow along with you while they remain admitted. Please contact [facility] if any clarification needed.          Miya Sanchez PA-C

## 2023-10-19 NOTE — NURSING NOTE
Dr Agustin notified of daughter's wishes.  MD stated that her resident was told to discharge patient earlier today.

## 2023-10-19 NOTE — NURSING NOTE
Daughter called and insisted that pt is on wrong meds.  This nurse offered to call physician and have pt discharged to home with daughter.  Daughter insisted that MD be called.  Message sent to Dr Agustin.

## 2023-10-19 NOTE — PROGRESS NOTES
Spoke with patient's daughter Bethany.  Patient will go home on discharge.  She tells me that patient does not do well in rehab settings and family is prepared to care for her at home.  Asking that she discharge today if medically appropriate.  Team notified.

## 2025-01-03 ENCOUNTER — APPOINTMENT (OUTPATIENT)
Dept: CARDIOLOGY | Facility: HOSPITAL | Age: 80
End: 2025-01-03
Payer: MEDICARE

## 2025-01-03 ENCOUNTER — HOSPITAL ENCOUNTER (INPATIENT)
Facility: HOSPITAL | Age: 80
End: 2025-01-03
Attending: STUDENT IN AN ORGANIZED HEALTH CARE EDUCATION/TRAINING PROGRAM | Admitting: FAMILY MEDICINE
Payer: MEDICARE

## 2025-01-03 ENCOUNTER — APPOINTMENT (OUTPATIENT)
Dept: RADIOLOGY | Facility: HOSPITAL | Age: 80
End: 2025-01-03
Payer: MEDICARE

## 2025-01-03 DIAGNOSIS — J21.0 RSV BRONCHIOLITIS: ICD-10-CM

## 2025-01-03 DIAGNOSIS — J96.01 ACUTE HYPOXIC RESPIRATORY FAILURE (MULTI): Primary | ICD-10-CM

## 2025-01-03 DIAGNOSIS — I48.21 PERMANENT ATRIAL FIBRILLATION (MULTI): Chronic | ICD-10-CM

## 2025-01-03 DIAGNOSIS — E66.811 OBESITY, CLASS I, BMI 30-34.9: ICD-10-CM

## 2025-01-03 DIAGNOSIS — R53.81 PHYSICAL DECONDITIONING: ICD-10-CM

## 2025-01-03 DIAGNOSIS — R06.09 DOE (DYSPNEA ON EXERTION): ICD-10-CM

## 2025-01-03 DIAGNOSIS — E55.9 VITAMIN D DEFICIENCY: ICD-10-CM

## 2025-01-03 DIAGNOSIS — I50.9 ACUTE ON CHRONIC CONGESTIVE HEART FAILURE, UNSPECIFIED HEART FAILURE TYPE: ICD-10-CM

## 2025-01-03 DIAGNOSIS — I49.5 SICK SINUS SYNDROME (MULTI): ICD-10-CM

## 2025-01-03 DIAGNOSIS — I10 PRIMARY HYPERTENSION: ICD-10-CM

## 2025-01-03 DIAGNOSIS — R79.89 ELEVATED TROPONIN: ICD-10-CM

## 2025-01-03 DIAGNOSIS — B35.4 TINEA CORPORIS: ICD-10-CM

## 2025-01-03 DIAGNOSIS — I48.92 ATRIAL FLUTTER, UNSPECIFIED TYPE (MULTI): ICD-10-CM

## 2025-01-03 PROBLEM — R06.00 DYSPNEA: Status: ACTIVE | Noted: 2017-11-03

## 2025-01-03 PROBLEM — I50.32 CHRONIC DIASTOLIC HEART FAILURE: Status: ACTIVE | Noted: 2017-11-03

## 2025-01-03 PROBLEM — I44.30 ATRIOVENTRICULAR BLOCK: Status: ACTIVE | Noted: 2025-01-03

## 2025-01-03 PROBLEM — R13.12 OROPHARYNGEAL DYSPHAGIA: Status: ACTIVE | Noted: 2024-12-17

## 2025-01-03 PROBLEM — I47.10 PAROXYSMAL SUPRAVENTRICULAR TACHYCARDIA (CMS-HCC): Status: ACTIVE | Noted: 2025-01-03

## 2025-01-03 PROBLEM — I05.9 MITRAL VALVE DISORDER: Status: ACTIVE | Noted: 2025-01-03

## 2025-01-03 PROBLEM — J30.2 SEASONAL ALLERGIC RHINITIS: Status: ACTIVE | Noted: 2020-01-13

## 2025-01-03 PROBLEM — G23.1 PSP (PROGRESSIVE SUPRANUCLEAR PALSY) (MULTI): Chronic | Status: ACTIVE | Noted: 2017-11-03

## 2025-01-03 LAB
ALBUMIN SERPL BCP-MCNC: 4.1 G/DL (ref 3.4–5)
ALP SERPL-CCNC: 68 U/L (ref 33–136)
ALT SERPL W P-5'-P-CCNC: 10 U/L (ref 7–45)
ANION GAP SERPL CALC-SCNC: 10 MMOL/L (ref 10–20)
APPEARANCE UR: ABNORMAL
AST SERPL W P-5'-P-CCNC: 19 U/L (ref 9–39)
BASE EXCESS BLDV CALC-SCNC: 1.3 MMOL/L (ref -2–3)
BASOPHILS # BLD AUTO: 0.02 X10*3/UL (ref 0–0.1)
BASOPHILS NFR BLD AUTO: 0.4 %
BILIRUB SERPL-MCNC: 1 MG/DL (ref 0–1.2)
BILIRUB UR STRIP.AUTO-MCNC: NEGATIVE MG/DL
BNP SERPL-MCNC: 975 PG/ML (ref 0–99)
BODY TEMPERATURE: 37 DEGREES CELSIUS
BUN SERPL-MCNC: 17 MG/DL (ref 6–23)
CALCIUM SERPL-MCNC: 9.1 MG/DL (ref 8.6–10.3)
CAOX CRY #/AREA UR COMP ASSIST: ABNORMAL /HPF
CARDIAC TROPONIN I PNL SERPL HS: 55 NG/L (ref 0–13)
CARDIAC TROPONIN I PNL SERPL HS: 57 NG/L (ref 0–13)
CARDIAC TROPONIN I PNL SERPL HS: 66 NG/L (ref 0–13)
CARDIAC TROPONIN I PNL SERPL HS: 70 NG/L (ref 0–13)
CHLORIDE SERPL-SCNC: 104 MMOL/L (ref 98–107)
CO2 SERPL-SCNC: 28 MMOL/L (ref 21–32)
COLOR UR: YELLOW
CREAT SERPL-MCNC: 0.94 MG/DL (ref 0.5–1.05)
EGFRCR SERPLBLD CKD-EPI 2021: 61 ML/MIN/1.73M*2
EOSINOPHIL # BLD AUTO: 0.01 X10*3/UL (ref 0–0.4)
EOSINOPHIL NFR BLD AUTO: 0.2 %
ERYTHROCYTE [DISTWIDTH] IN BLOOD BY AUTOMATED COUNT: 14.6 % (ref 11.5–14.5)
FLUAV RNA RESP QL NAA+PROBE: NOT DETECTED
FLUBV RNA RESP QL NAA+PROBE: NOT DETECTED
GLUCOSE SERPL-MCNC: 121 MG/DL (ref 74–99)
GLUCOSE UR STRIP.AUTO-MCNC: NORMAL MG/DL
HCO3 BLDV-SCNC: 27.2 MMOL/L (ref 22–26)
HCT VFR BLD AUTO: 36 % (ref 36–46)
HGB BLD-MCNC: 10.8 G/DL (ref 12–16)
IMM GRANULOCYTES # BLD AUTO: 0.02 X10*3/UL (ref 0–0.5)
IMM GRANULOCYTES NFR BLD AUTO: 0.4 % (ref 0–0.9)
INHALED O2 CONCENTRATION: 28 %
INR PPP: 2.8 (ref 0.9–1.1)
KETONES UR STRIP.AUTO-MCNC: NEGATIVE MG/DL
LACTATE SERPL-SCNC: 1.6 MMOL/L (ref 0.4–2)
LEUKOCYTE ESTERASE UR QL STRIP.AUTO: NEGATIVE
LYMPHOCYTES # BLD AUTO: 0.49 X10*3/UL (ref 0.8–3)
LYMPHOCYTES NFR BLD AUTO: 9.4 %
MCH RBC QN AUTO: 26.4 PG (ref 26–34)
MCHC RBC AUTO-ENTMCNC: 30 G/DL (ref 32–36)
MCV RBC AUTO: 88 FL (ref 80–100)
MONOCYTES # BLD AUTO: 0.54 X10*3/UL (ref 0.05–0.8)
MONOCYTES NFR BLD AUTO: 10.4 %
NEUTROPHILS # BLD AUTO: 4.12 X10*3/UL (ref 1.6–5.5)
NEUTROPHILS NFR BLD AUTO: 79.2 %
NITRITE UR QL STRIP.AUTO: NEGATIVE
NRBC BLD-RTO: 0 /100 WBCS (ref 0–0)
OXYHGB MFR BLDV: 81.6 % (ref 45–75)
PCO2 BLDV: 47 MM HG (ref 41–51)
PH BLDV: 7.37 PH (ref 7.33–7.43)
PH UR STRIP.AUTO: 5.5 [PH]
PLATELET # BLD AUTO: 149 X10*3/UL (ref 150–450)
PO2 BLDV: 55 MM HG (ref 35–45)
POTASSIUM SERPL-SCNC: 4.3 MMOL/L (ref 3.5–5.3)
PROT SERPL-MCNC: 6.8 G/DL (ref 6.4–8.2)
PROT UR STRIP.AUTO-MCNC: ABNORMAL MG/DL
PROTHROMBIN TIME: 32.4 SECONDS (ref 9.8–12.8)
RBC # BLD AUTO: 4.09 X10*6/UL (ref 4–5.2)
RBC # UR STRIP.AUTO: ABNORMAL /UL
RBC #/AREA URNS AUTO: >20 /HPF
RSV RNA RESP QL NAA+PROBE: DETECTED
SAO2 % BLDV: 83 % (ref 45–75)
SARS-COV-2 RNA RESP QL NAA+PROBE: NOT DETECTED
SODIUM SERPL-SCNC: 138 MMOL/L (ref 136–145)
SP GR UR STRIP.AUTO: 1.04
SQUAMOUS #/AREA URNS AUTO: ABNORMAL /HPF
UROBILINOGEN UR STRIP.AUTO-MCNC: ABNORMAL MG/DL
WBC # BLD AUTO: 5.2 X10*3/UL (ref 4.4–11.3)
WBC #/AREA URNS AUTO: ABNORMAL /HPF

## 2025-01-03 PROCEDURE — 99223 1ST HOSP IP/OBS HIGH 75: CPT | Performed by: STUDENT IN AN ORGANIZED HEALTH CARE EDUCATION/TRAINING PROGRAM

## 2025-01-03 PROCEDURE — 84484 ASSAY OF TROPONIN QUANT: CPT | Performed by: STUDENT IN AN ORGANIZED HEALTH CARE EDUCATION/TRAINING PROGRAM

## 2025-01-03 PROCEDURE — 2500000002 HC RX 250 W HCPCS SELF ADMINISTERED DRUGS (ALT 637 FOR MEDICARE OP, ALT 636 FOR OP/ED)

## 2025-01-03 PROCEDURE — 87040 BLOOD CULTURE FOR BACTERIA: CPT | Mod: PORLAB | Performed by: STUDENT IN AN ORGANIZED HEALTH CARE EDUCATION/TRAINING PROGRAM

## 2025-01-03 PROCEDURE — 2500000004 HC RX 250 GENERAL PHARMACY W/ HCPCS (ALT 636 FOR OP/ED): Performed by: STUDENT IN AN ORGANIZED HEALTH CARE EDUCATION/TRAINING PROGRAM

## 2025-01-03 PROCEDURE — 85025 COMPLETE CBC W/AUTO DIFF WBC: CPT | Performed by: STUDENT IN AN ORGANIZED HEALTH CARE EDUCATION/TRAINING PROGRAM

## 2025-01-03 PROCEDURE — 83880 ASSAY OF NATRIURETIC PEPTIDE: CPT | Performed by: STUDENT IN AN ORGANIZED HEALTH CARE EDUCATION/TRAINING PROGRAM

## 2025-01-03 PROCEDURE — 83605 ASSAY OF LACTIC ACID: CPT | Performed by: STUDENT IN AN ORGANIZED HEALTH CARE EDUCATION/TRAINING PROGRAM

## 2025-01-03 PROCEDURE — 93005 ELECTROCARDIOGRAM TRACING: CPT

## 2025-01-03 PROCEDURE — 99285 EMERGENCY DEPT VISIT HI MDM: CPT | Performed by: STUDENT IN AN ORGANIZED HEALTH CARE EDUCATION/TRAINING PROGRAM

## 2025-01-03 PROCEDURE — 87637 SARSCOV2&INF A&B&RSV AMP PRB: CPT | Performed by: STUDENT IN AN ORGANIZED HEALTH CARE EDUCATION/TRAINING PROGRAM

## 2025-01-03 PROCEDURE — 36415 COLL VENOUS BLD VENIPUNCTURE: CPT | Performed by: STUDENT IN AN ORGANIZED HEALTH CARE EDUCATION/TRAINING PROGRAM

## 2025-01-03 PROCEDURE — 85610 PROTHROMBIN TIME: CPT | Performed by: STUDENT IN AN ORGANIZED HEALTH CARE EDUCATION/TRAINING PROGRAM

## 2025-01-03 PROCEDURE — 81001 URINALYSIS AUTO W/SCOPE: CPT | Performed by: STUDENT IN AN ORGANIZED HEALTH CARE EDUCATION/TRAINING PROGRAM

## 2025-01-03 PROCEDURE — 71045 X-RAY EXAM CHEST 1 VIEW: CPT

## 2025-01-03 PROCEDURE — 71045 X-RAY EXAM CHEST 1 VIEW: CPT | Mod: FOREIGN READ | Performed by: RADIOLOGY

## 2025-01-03 PROCEDURE — 82805 BLOOD GASES W/O2 SATURATION: CPT | Performed by: STUDENT IN AN ORGANIZED HEALTH CARE EDUCATION/TRAINING PROGRAM

## 2025-01-03 PROCEDURE — 96374 THER/PROPH/DIAG INJ IV PUSH: CPT

## 2025-01-03 PROCEDURE — 80053 COMPREHEN METABOLIC PANEL: CPT | Performed by: STUDENT IN AN ORGANIZED HEALTH CARE EDUCATION/TRAINING PROGRAM

## 2025-01-03 PROCEDURE — 2500000002 HC RX 250 W HCPCS SELF ADMINISTERED DRUGS (ALT 637 FOR MEDICARE OP, ALT 636 FOR OP/ED): Performed by: STUDENT IN AN ORGANIZED HEALTH CARE EDUCATION/TRAINING PROGRAM

## 2025-01-03 PROCEDURE — 2500000005 HC RX 250 GENERAL PHARMACY W/O HCPCS: Performed by: STUDENT IN AN ORGANIZED HEALTH CARE EDUCATION/TRAINING PROGRAM

## 2025-01-03 RX ORDER — WARFARIN SODIUM 5 MG/1
2.5 TABLET ORAL ONCE
Status: COMPLETED | OUTPATIENT
Start: 2025-01-03 | End: 2025-01-03

## 2025-01-03 RX ORDER — AZELASTINE 1 MG/ML
1 SPRAY, METERED NASAL 2 TIMES DAILY
Status: DISCONTINUED | OUTPATIENT
Start: 2025-01-03 | End: 2025-01-11 | Stop reason: HOSPADM

## 2025-01-03 RX ORDER — ATORVASTATIN CALCIUM 10 MG/1
10 TABLET, FILM COATED ORAL DAILY
Status: DISCONTINUED | OUTPATIENT
Start: 2025-01-03 | End: 2025-01-11 | Stop reason: HOSPADM

## 2025-01-03 RX ORDER — ALBUTEROL SULFATE 90 UG/1
2 INHALANT RESPIRATORY (INHALATION) EVERY 6 HOURS PRN
Status: DISCONTINUED | OUTPATIENT
Start: 2025-01-03 | End: 2025-01-11 | Stop reason: HOSPADM

## 2025-01-03 RX ORDER — BISACODYL 5 MG
10 TABLET, DELAYED RELEASE (ENTERIC COATED) ORAL DAILY PRN
Status: DISCONTINUED | OUTPATIENT
Start: 2025-01-03 | End: 2025-01-11 | Stop reason: HOSPADM

## 2025-01-03 RX ORDER — POLYETHYLENE GLYCOL 3350 17 G/17G
17 POWDER, FOR SOLUTION ORAL DAILY
Status: DISCONTINUED | OUTPATIENT
Start: 2025-01-03 | End: 2025-01-11 | Stop reason: HOSPADM

## 2025-01-03 RX ORDER — DONEPEZIL HYDROCHLORIDE 5 MG/1
10 TABLET, FILM COATED ORAL NIGHTLY
Status: DISCONTINUED | OUTPATIENT
Start: 2025-01-03 | End: 2025-01-11 | Stop reason: HOSPADM

## 2025-01-03 RX ORDER — IPRATROPIUM BROMIDE AND ALBUTEROL SULFATE 2.5; .5 MG/3ML; MG/3ML
3 SOLUTION RESPIRATORY (INHALATION) 4 TIMES DAILY PRN
COMMUNITY
Start: 2024-03-21

## 2025-01-03 RX ORDER — HEPARIN 100 UNIT/ML
500 SYRINGE INTRAVENOUS ONCE AS NEEDED
Status: DISCONTINUED | OUTPATIENT
Start: 2025-01-03 | End: 2025-01-11 | Stop reason: HOSPADM

## 2025-01-03 RX ORDER — FLUTICASONE PROPIONATE 50 MCG
2 SPRAY, SUSPENSION (ML) NASAL DAILY
Status: DISCONTINUED | OUTPATIENT
Start: 2025-01-03 | End: 2025-01-11 | Stop reason: HOSPADM

## 2025-01-03 RX ORDER — ONDANSETRON 4 MG/1
4 TABLET, FILM COATED ORAL EVERY 8 HOURS PRN
Status: DISCONTINUED | OUTPATIENT
Start: 2025-01-03 | End: 2025-01-11 | Stop reason: HOSPADM

## 2025-01-03 RX ORDER — ONDANSETRON HYDROCHLORIDE 2 MG/ML
4 INJECTION, SOLUTION INTRAVENOUS EVERY 8 HOURS PRN
Status: DISCONTINUED | OUTPATIENT
Start: 2025-01-03 | End: 2025-01-11 | Stop reason: HOSPADM

## 2025-01-03 RX ORDER — DONEPEZIL HYDROCHLORIDE 5 MG/1
10 TABLET, FILM COATED ORAL NIGHTLY
COMMUNITY
Start: 2024-06-18 | End: 2025-01-04 | Stop reason: ENTERED-IN-ERROR

## 2025-01-03 RX ORDER — FUROSEMIDE 10 MG/ML
40 INJECTION INTRAMUSCULAR; INTRAVENOUS 2 TIMES DAILY
Status: DISCONTINUED | OUTPATIENT
Start: 2025-01-03 | End: 2025-01-08

## 2025-01-03 RX ORDER — TALC
3 POWDER (GRAM) TOPICAL NIGHTLY PRN
Status: DISCONTINUED | OUTPATIENT
Start: 2025-01-03 | End: 2025-01-11 | Stop reason: HOSPADM

## 2025-01-03 RX ORDER — METOPROLOL SUCCINATE 50 MG/1
50 TABLET, EXTENDED RELEASE ORAL DAILY
Status: DISCONTINUED | OUTPATIENT
Start: 2025-01-04 | End: 2025-01-03

## 2025-01-03 RX ORDER — AMOXICILLIN AND CLAVULANATE POTASSIUM 875; 125 MG/1; MG/1
875 TABLET, FILM COATED ORAL 2 TIMES DAILY
COMMUNITY
Start: 2024-12-31 | End: 2025-01-11 | Stop reason: HOSPADM

## 2025-01-03 RX ORDER — PANTOPRAZOLE SODIUM 40 MG/10ML
40 INJECTION, POWDER, LYOPHILIZED, FOR SOLUTION INTRAVENOUS
Status: DISCONTINUED | OUTPATIENT
Start: 2025-01-04 | End: 2025-01-11 | Stop reason: HOSPADM

## 2025-01-03 RX ORDER — ACETAMINOPHEN 325 MG/1
650 TABLET ORAL EVERY 4 HOURS PRN
Status: DISCONTINUED | OUTPATIENT
Start: 2025-01-03 | End: 2025-01-11 | Stop reason: HOSPADM

## 2025-01-03 RX ORDER — PANTOPRAZOLE SODIUM 40 MG/1
40 TABLET, DELAYED RELEASE ORAL
Status: DISCONTINUED | OUTPATIENT
Start: 2025-01-04 | End: 2025-01-11 | Stop reason: HOSPADM

## 2025-01-03 RX ORDER — BISACODYL 10 MG/1
10 SUPPOSITORY RECTAL DAILY PRN
Status: DISCONTINUED | OUTPATIENT
Start: 2025-01-03 | End: 2025-01-11 | Stop reason: HOSPADM

## 2025-01-03 RX ORDER — GUAIFENESIN 600 MG/1
600 TABLET, EXTENDED RELEASE ORAL EVERY 12 HOURS PRN
Status: DISCONTINUED | OUTPATIENT
Start: 2025-01-03 | End: 2025-01-04

## 2025-01-03 RX ORDER — HEPARIN SODIUM,PORCINE/PF 10 UNIT/ML
50 SYRINGE (ML) INTRAVENOUS AS NEEDED
Status: DISCONTINUED | OUTPATIENT
Start: 2025-01-03 | End: 2025-01-11 | Stop reason: HOSPADM

## 2025-01-03 RX ADMIN — FUROSEMIDE 40 MG: 10 INJECTION, SOLUTION INTRAMUSCULAR; INTRAVENOUS at 19:32

## 2025-01-03 RX ADMIN — Medication 2 L/MIN: at 19:25

## 2025-01-03 RX ADMIN — FLUTICASONE PROPIONATE 2 SPRAY: 50 SPRAY, METERED NASAL at 19:41

## 2025-01-03 RX ADMIN — Medication 2 L/MIN: at 19:30

## 2025-01-03 RX ADMIN — WARFARIN SODIUM 2.5 MG: 5 TABLET ORAL at 19:39

## 2025-01-03 SDOH — SOCIAL STABILITY: SOCIAL INSECURITY: HAVE YOU HAD THOUGHTS OF HARMING ANYONE ELSE?: NO

## 2025-01-03 SDOH — SOCIAL STABILITY: SOCIAL INSECURITY: WERE YOU ABLE TO COMPLETE ALL THE BEHAVIORAL HEALTH SCREENINGS?: YES

## 2025-01-03 ASSESSMENT — COGNITIVE AND FUNCTIONAL STATUS - GENERAL
MOVING FROM LYING ON BACK TO SITTING ON SIDE OF FLAT BED WITH BEDRAILS: A LITTLE
PATIENT BASELINE BEDBOUND: NO
STANDING UP FROM CHAIR USING ARMS: A LITTLE
CLIMB 3 TO 5 STEPS WITH RAILING: A LITTLE
PERSONAL GROOMING: A LITTLE
WALKING IN HOSPITAL ROOM: A LITTLE
DAILY ACTIVITIY SCORE: 18
TOILETING: A LITTLE
HELP NEEDED FOR BATHING: A LITTLE
MOVING TO AND FROM BED TO CHAIR: A LITTLE
DRESSING REGULAR UPPER BODY CLOTHING: A LITTLE
EATING MEALS: A LITTLE
DRESSING REGULAR LOWER BODY CLOTHING: A LITTLE
MOBILITY SCORE: 18
TURNING FROM BACK TO SIDE WHILE IN FLAT BAD: A LITTLE

## 2025-01-03 ASSESSMENT — PAIN - FUNCTIONAL ASSESSMENT: PAIN_FUNCTIONAL_ASSESSMENT: 0-10

## 2025-01-03 ASSESSMENT — ENCOUNTER SYMPTOMS
ABDOMINAL PAIN: 0
CONFUSION: 0
BLOOD IN STOOL: 0
SHORTNESS OF BREATH: 1
TREMORS: 0
PHOTOPHOBIA: 0
LIGHT-HEADEDNESS: 0
HEMATURIA: 0
PALPITATIONS: 0
FATIGUE: 1
DYSURIA: 0
COLOR CHANGE: 0
ACTIVITY CHANGE: 1
DIZZINESS: 0
POLYDIPSIA: 0
DIARRHEA: 1
APPETITE CHANGE: 1
WEAKNESS: 1
FEVER: 0
COUGH: 1
SLEEP DISTURBANCE: 0
VOMITING: 0
NAUSEA: 1
CHILLS: 0

## 2025-01-03 ASSESSMENT — LIFESTYLE VARIABLES
PRESCIPTION_ABUSE_PAST_12_MONTHS: NO
HAVE YOU EVER FELT YOU SHOULD CUT DOWN ON YOUR DRINKING: NO
HAVE PEOPLE ANNOYED YOU BY CRITICIZING YOUR DRINKING: NO
EVER FELT BAD OR GUILTY ABOUT YOUR DRINKING: NO
AUDIT-C TOTAL SCORE: 0
EVER HAD A DRINK FIRST THING IN THE MORNING TO STEADY YOUR NERVES TO GET RID OF A HANGOVER: NO
SKIP TO QUESTIONS 9-10: 1
HOW OFTEN DO YOU HAVE 6 OR MORE DRINKS ON ONE OCCASION: NEVER
AUDIT-C TOTAL SCORE: 0
HOW OFTEN DO YOU HAVE A DRINK CONTAINING ALCOHOL: NEVER
TOTAL SCORE: 0
SUBSTANCE_ABUSE_PAST_12_MONTHS: NO
HOW MANY STANDARD DRINKS CONTAINING ALCOHOL DO YOU HAVE ON A TYPICAL DAY: PATIENT DOES NOT DRINK

## 2025-01-03 ASSESSMENT — ACTIVITIES OF DAILY LIVING (ADL)
HEARING - RIGHT EAR: FUNCTIONAL
DRESSING YOURSELF: NEEDS ASSISTANCE
ADEQUATE_TO_COMPLETE_ADL: YES
LACK_OF_TRANSPORTATION: NO
WALKS IN HOME: NEEDS ASSISTANCE
GROOMING: NEEDS ASSISTANCE
PATIENT'S MEMORY ADEQUATE TO SAFELY COMPLETE DAILY ACTIVITIES?: YES
TOILETING: NEEDS ASSISTANCE
HEARING - LEFT EAR: FUNCTIONAL
JUDGMENT_ADEQUATE_SAFELY_COMPLETE_DAILY_ACTIVITIES: YES
BATHING: NEEDS ASSISTANCE
FEEDING YOURSELF: NEEDS ASSISTANCE

## 2025-01-03 ASSESSMENT — PATIENT HEALTH QUESTIONNAIRE - PHQ9
SUM OF ALL RESPONSES TO PHQ9 QUESTIONS 1 & 2: 0
2. FEELING DOWN, DEPRESSED OR HOPELESS: NOT AT ALL
1. LITTLE INTEREST OR PLEASURE IN DOING THINGS: NOT AT ALL

## 2025-01-03 ASSESSMENT — PAIN SCALES - GENERAL: PAINLEVEL_OUTOF10: 0 - NO PAIN

## 2025-01-03 ASSESSMENT — PAIN DESCRIPTION - PROGRESSION: CLINICAL_PROGRESSION: NOT CHANGED

## 2025-01-03 ASSESSMENT — COLUMBIA-SUICIDE SEVERITY RATING SCALE - C-SSRS
6. HAVE YOU EVER DONE ANYTHING, STARTED TO DO ANYTHING, OR PREPARED TO DO ANYTHING TO END YOUR LIFE?: NO
2. HAVE YOU ACTUALLY HAD ANY THOUGHTS OF KILLING YOURSELF?: NO
1. IN THE PAST MONTH, HAVE YOU WISHED YOU WERE DEAD OR WISHED YOU COULD GO TO SLEEP AND NOT WAKE UP?: NO

## 2025-01-03 NOTE — ED PROVIDER NOTES
HPI   Chief Complaint   Patient presents with    Shortness of Breath     Seen at [facility] ED Monday, started on abx. Not getting better, grandson at home has RSV.       HPI  79-year-old female with history of COPD not on home O2 presents with worsening shortness of breath over the past 4 days.  Patient was reportedly seen in the emergency department on Monday at [place], was started on Augmentin at that time.  She reports she has had worsening symptoms since then, now has a cough productive of thick mucus.  Reportedly has a grandson at home that is positive for RSV.  She endorses epigastric cramping.  She denies syncope, headache, chest pain, palpitations, nausea, vomiting, diarrhea, constipation, dysuria, flank pain, numbness, tingling, weakness, history of DVT or PE.      Patient History   No past medical history on file.  No past surgical history on file.  No family history on file.  Social History     Tobacco Use    Smoking status: Never    Smokeless tobacco: Never   Substance Use Topics    Alcohol use: Not on file    Drug use: Not on file       Physical Exam   ED Triage Vitals [01/03/25 1403]   Temperature Heart Rate Respirations BP   36.8 °C (98.2 °F) 60 (!) 30 (!) 182/83      Pulse Ox Temp Source Heart Rate Source Patient Position   96 % Temporal Monitor Lying      BP Location FiO2 (%)     Left arm --       Physical Exam  Vitals reviewed.   Eyes:      Pupils: Pupils are equal, round, and reactive to light.   Cardiovascular:      Rate and Rhythm: Normal rate and regular rhythm.   Pulmonary:      Breath sounds: Examination of the right-middle field reveals rhonchi. Examination of the left-middle field reveals rhonchi. Examination of the right-lower field reveals rhonchi. Examination of the left-lower field reveals rhonchi. Decreased breath sounds and rhonchi present.   Chest:      Chest wall: No tenderness.   Abdominal:      Palpations: Abdomen is soft.      Tenderness: There is no abdominal tenderness.  There is no guarding or rebound.   Musculoskeletal:         General: Normal range of motion.      Right lower leg: Edema present.      Left lower leg: Edema present.   Skin:     General: Skin is warm and dry.   Neurological:      Mental Status: She is alert and oriented to person, place, and time.           ED Course & MDM   ED Course as of 01/05/25 1508   Fri Jan 03, 2025   1423 EKG, interpreted by me: Sinus rhythm, rate 60 bpm.  Left axis. Diffuse ST elevation throughout. No significant reciprocal ST depression. Wide complex QRS. . No evidence of acute STEMI. [JG]   1443 Troponin I, High Sensitivity(!!): 66 [JG]   1523 RSV PCR(!): Detected [JG]   1543 Troponin I, High Sensitivity(!!): 70 [JG]   1742 Cardiologist Dr. Mcnulty consulted for patient to be admitted with CHF exacerbation and NSTEMI with uptrending troponins already on anticoagulation. Per Dr. Mcnulty, ok to trend troponins, no need to switch anticoagulation at this time and no need to start heparin gtt. Ok to continue home warfarin if therapeutic. [JG]      ED Course User Index  [JG] Camila Schwartz MD         Diagnoses as of 01/05/25 1508   Acute hypoxic respiratory failure (Multi) - continue oxygen therapy, wean as possible, manage underlying CHF and RSV infection                 No data recorded     Kathya Coma Scale Score: 15 (01/05/25 1200 : Nona Avila RN)                     Medical Decision Making  79-year-old female with history of COPD not on home O2 presents with worsening shortness of breath over the past 4 days.  Patient was reportedly seen in the emergency department on Monday at [place], was started on Augmentin at that time.  She reports she has had worsening symptoms since then, now has a cough productive of thick mucus.  Reportedly has a grandson at home that is positive for RSV.  She endorses epigastric cramping.  On evaluation, patient on 4 L O2 by nasal cannula.  Not on any home O2.  I decreased her to 2 L nasal cannula as  patient was satting 97% on the 4 L.  She remains at 97%.  I suspect she can be shortly weaned.  She is tachypneic at 30, therefore we will leave 2 L for comfort at this time.  She does have diffuse rhonchi to bilateral lungs.  1+ pitting edema bilaterally.  Grandson reportedly positive for RSV, suspect underlying viral process.  Lab workup including chest x-ray, viral testing, troponin, BNP, VBG pending.    Patient not on home O2 with new oxygen requirement today. Tachypneic with rhonchi and slight rales bilaterally. Found to be RSV positive. No convincing evidence of underlying bacterial pneumonia, at this time patient not given IV antibiotics. Uptrending troponins suggestive of type II NSTEMI, patient currently on warfarin, discussed case with Dr. Mcnulty, in absence of chest pain Dr. Mcnulty agrees with continuing warfarin, patient does not require heparin gtt at this time. Will continue to trend troponins. Patient admitted to [facility] for RSV pneumonia with new oxygen requirement and type II NSTEMI.    Procedure  Procedures     Camila Schwartz MD  01/03/25 2240       Camila Schwartz MD  01/04/25 1731       Camila Schwartz MD  01/05/25 3338

## 2025-01-03 NOTE — H&P
"[facility] - GENERAL MEDICINE HISTORY AND PHYSICAL    History Obtained From (Primary Source): Patient  Collateral History (Secondary Sources): D/w daughter at bedside     History Of Present Illness (HPI):  Bethany Espinoza is a 79 y.o. female with PMHx s/f HTN, HLD, CAD, Afib/flutter on warfarin, SSS s/p PPM, chronic diastolic heart failure, pHTN, valvular heart disease, PVD, progressive supranuclear palsy (predominantly immobile; also has issues with dysphagia), obesity, presenting with SOB, cough, congestion, and worsening weakness. Hx mostly obtained from her daughter at the bedside. In short, pt was in her typical state of health until Monday (12/30/24) when she became congested. Was concerned this may have been RSV or a sinus infection as her grandson had visited and was just getting over RSV. She went to see her PCP and was prescribed decongestants and Augmentin 12/31/24 to help with symptoms; tested negative for Flu, COVID, and ?RSV at that time. Over the next few days, daughter called in to check on her; until day of admission (01/03/2025), patient had reported that she had been doing okay, but today she informed her daughter that she was feeling unwell and needed assistance. When her daughter arrived, she found the patient to sound like she was \"drowning\" in her fluid around her lungs which has happened with prior issues with her heart failure, additionally she had been so weak that her patient's  was unable to help get her up out of bed to go to the bathroom and she did end up soiling herself. Patient reports that she has felt nauseous and has had diarrhea x 1 day as well, does not appear to have missed any medications in terms of heart failure; however, has just been generally unwell. Denies cp/pressure, palpitations, diaphoresis, dizziness / lightheadedness, syncope or near syncope, HA, vision changes, f/c, abd pain. Has chronic baseline lower extremity edema which is unchanged times " many years per daughter at the bedside.    ED Course (Summary - please note all labs, imaging studies, and interventions noted below have been personally reviewed and/or interpreted on day of admission):   Vitals on presentation: T98.2, /83, HR 60, RR 30 (most recently 24), SpO2 96% 2 L nasal cannula  Labs: CBC with WBC 5.2, Hgb 10.8, platelets 149.  CMP with glucose 121, sodium 138, potassium 4.3, BUN 17, serum creatinine 0.94.  Lactate 1.6.  .  High-sensitivity troponin [id number] on admission 57 and 55.  PT/INR: 22.4/2.8.  RSV positive; flu A, B, COVID PCR all negative.  VBG relatively unrevealing.  UA without evidence of infection.  EKG: Sinus with short UT; right bundle branch block which has been previously seen on prior EKGs as well, no overt acute ischemic changes compared to prior  Imaging: CXR with moderate cardiomegaly, vascular congestion  Interventions: No medications given in the ED; cardiology was consulted for elevated troponin and they felt given the EKG stability compared to prior there was no need for heparinization unless the patient was not therapeutic with her warfarin.  Admitted to medicine for further management    12-point ROS reviewed and found to be negative aside from aforementioned positives in HPI and/or noted in dedicated ROS section below.     ED Course (From ED Provider):  ED Course as of 01/03/25 1810 Fri Jan 03, 2025   1423 EKG, interpreted by me: Sinus rhythm, rate 60 bpm.  Left axis. Diffuse ST elevation throughout. No significant reciprocal ST depression. Wide complex QRS. . No evidence of acute STEMI. [JG]   1443 Troponin I, High Sensitivity(!!): 66 [JG]   1523 RSV PCR(!): Detected [JG]   1543 Troponin I, High Sensitivity(!!): 70 [JG]   1742 Cardiologist Dr. Mcnulty consulted for patient to be admitted with CHF exacerbation and NSTEMI with uptrending troponins already on anticoagulation. Per Dr. Mcnulty, ok to trend troponins, no need to switch  anticoagulation at this time and no need to start heparin gtt. Ok to continue home warfarin if therapeutic. [JG]      ED Course User Index  [JG] Camila Schwartz MD         Diagnoses as of 01/03/25 1810   Acute hypoxic respiratory failure (Multi)     Relevant Results  Results for orders placed or performed during the hospital encounter of 01/03/25 (from the past 24 hours)   Sars-CoV-2 and Influenza A/B PCR   Result Value Ref Range    Flu A Result Not Detected Not Detected    Flu B Result Not Detected Not Detected    Coronavirus 2019, PCR Not Detected Not Detected   RSV PCR   Result Value Ref Range    RSV PCR Detected (A) Not Detected   CBC and Auto Differential   Result Value Ref Range    WBC 5.2 4.4 - 11.3 x10*3/uL    nRBC 0.0 0.0 - 0.0 /100 WBCs    RBC 4.09 4.00 - 5.20 x10*6/uL    Hemoglobin 10.8 (L) 12.0 - 16.0 g/dL    Hematocrit 36.0 36.0 - 46.0 %    MCV 88 80 - 100 fL    MCH 26.4 26.0 - 34.0 pg    MCHC 30.0 (L) 32.0 - 36.0 g/dL    RDW 14.6 (H) 11.5 - 14.5 %    Platelets 149 (L) 150 - 450 x10*3/uL    Neutrophils % 79.2 40.0 - 80.0 %    Immature Granulocytes %, Automated 0.4 0.0 - 0.9 %    Lymphocytes % 9.4 13.0 - 44.0 %    Monocytes % 10.4 2.0 - 10.0 %    Eosinophils % 0.2 0.0 - 6.0 %    Basophils % 0.4 0.0 - 2.0 %    Neutrophils Absolute 4.12 1.60 - 5.50 x10*3/uL    Immature Granulocytes Absolute, Automated 0.02 0.00 - 0.50 x10*3/uL    Lymphocytes Absolute 0.49 (L) 0.80 - 3.00 x10*3/uL    Monocytes Absolute 0.54 0.05 - 0.80 x10*3/uL    Eosinophils Absolute 0.01 0.00 - 0.40 x10*3/uL    Basophils Absolute 0.02 0.00 - 0.10 x10*3/uL   Comprehensive metabolic panel   Result Value Ref Range    Glucose 121 (H) 74 - 99 mg/dL    Sodium 138 136 - 145 mmol/L    Potassium 4.3 3.5 - 5.3 mmol/L    Chloride 104 98 - 107 mmol/L    Bicarbonate 28 21 - 32 mmol/L    Anion Gap 10 10 - 20 mmol/L    Urea Nitrogen 17 6 - 23 mg/dL    Creatinine 0.94 0.50 - 1.05 mg/dL    eGFR 61 >60 mL/min/1.73m*2    Calcium 9.1 8.6 - 10.3 mg/dL     Albumin 4.1 3.4 - 5.0 g/dL    Alkaline Phosphatase 68 33 - 136 U/L    Total Protein 6.8 6.4 - 8.2 g/dL    AST 19 9 - 39 U/L    Bilirubin, Total 1.0 0.0 - 1.2 mg/dL    ALT 10 7 - 45 U/L   B-Type Natriuretic Peptide   Result Value Ref Range     (H) 0 - 99 pg/mL   Blood Gas Venous   Result Value Ref Range    POCT pH, Venous 7.37 7.33 - 7.43 pH    POCT pCO2, Venous 47 41 - 51 mm Hg    POCT pO2, Venous 55 (H) 35 - 45 mm Hg    POCT SO2, Venous 83 (H) 45 - 75 %    POCT Oxy Hemoglobin, Venous 81.6 (H) 45.0 - 75.0 %    POCT Base Excess, Venous 1.3 -2.0 - 3.0 mmol/L    POCT HCO3 Calculated, Venous 27.2 (H) 22.0 - 26.0 mmol/L    Patient Temperature 37.0 degrees Celsius    FiO2 28 %   Troponin I, High Sensitivity, Initial   Result Value Ref Range    Troponin I, High Sensitivity 66 (HH) 0 - 13 ng/L   Lactate   Result Value Ref Range    Lactate 1.6 0.4 - 2.0 mmol/L   Troponin, High Sensitivity, 1 Hour   Result Value Ref Range    Troponin I, High Sensitivity 70 (HH) 0 - 13 ng/L   Protime-INR   Result Value Ref Range    Protime 32.4 (H) 9.8 - 12.8 seconds    INR 2.8 (H) 0.9 - 1.1   Urinalysis with Reflex Culture and Microscopic   Result Value Ref Range    Color, Urine Yellow Light-Yellow, Yellow, Dark-Yellow    Appearance, Urine Ex.Turbid (N) Clear    Specific Gravity, Urine 1.043 (N) 1.005 - 1.035    pH, Urine 5.5 5.0, 5.5, 6.0, 6.5, 7.0, 7.5, 8.0    Protein, Urine 100 (2+) (A) NEGATIVE, 10 (TRACE), 20 (TRACE) mg/dL    Glucose, Urine Normal Normal mg/dL    Blood, Urine 0.2 (2+) (A) NEGATIVE    Ketones, Urine NEGATIVE NEGATIVE mg/dL    Bilirubin, Urine NEGATIVE NEGATIVE    Urobilinogen, Urine 2 (1+) (A) Normal mg/dL    Nitrite, Urine NEGATIVE NEGATIVE    Leukocyte Esterase, Urine NEGATIVE NEGATIVE   Urinalysis Microscopic   Result Value Ref Range    WBC, Urine NONE 1-5, NONE /HPF    RBC, Urine >20 (A) NONE, 1-2, 3-5 /HPF    Squamous Epithelial Cells, Urine 1-9 (SPARSE) Reference range not established. /HPF    Calcium Oxalate  Crystals, Urine 4+ (A) NONE, 1+ /HPF      XR chest 1 view    Result Date: 1/3/2025  STUDY: Chest Radiograph;  1/3/2025 1:32PM INDICATION: Shortness of breath, new O2 requirement. COMPARISON: None Available. ACCESSION NUMBER(S): [id number] ORDERING CLINICIAN: JUDIT POTTER TECHNIQUE:  Frontal chest was obtained at 14:31 hours. FINDINGS: A right-sided Mediport is present.  There is vascular congestion. Heart size is enlarged.  There is no evidence of a pneumothorax.    1.  Vascular congestion with cardiomegaly. Signed by Yoni Wilkes MD    Scheduled medications:  atorvastatin, 10 mg, oral, Daily  azelastine, 1 spray, Each Nostril, BID  fluticasone, 2 spray, Each Nostril, Daily  furosemide, 40 mg, intravenous, BID  [START ON 1/4/2025] metoprolol succinate XL, 50 mg, oral, Daily  oxygen, , inhalation, Continuous - Inhalation  [START ON 1/4/2025] pantoprazole, 40 mg, oral, Daily before breakfast   Or  [START ON 1/4/2025] pantoprazole, 40 mg, intravenous, Daily before breakfast  perflutren protein A microsphere, 0.5 mL, intravenous, Once in imaging  polyethylene glycol, 17 g, oral, Daily      Continuous medications:     PRN medications:  PRN medications: acetaminophen, bisacodyl, bisacodyl, guaiFENesin, melatonin, ondansetron **OR** ondansetron     Past Medical History  She has no past medical history on file.    Surgical History  She has no past surgical history on file.     Social History  She reports that she has never smoked. She has never used smokeless tobacco. No history on file for alcohol use and drug use.    Family History  No family history on file.    Allergies  Patient has no known allergies.    Code Status  Full Code     Review of Systems   Constitutional:  Positive for activity change, appetite change and fatigue. Negative for chills and fever.   HENT:  Positive for congestion.    Eyes:  Negative for photophobia and visual disturbance.   Respiratory:  Positive for cough and shortness of breath.     Cardiovascular:  Positive for leg swelling (at baseline per daughter). Negative for chest pain and palpitations.   Gastrointestinal:  Positive for diarrhea (x 1 day) and nausea. Negative for abdominal pain, blood in stool and vomiting.   Endocrine: Negative for polydipsia and polyuria.   Genitourinary:  Negative for decreased urine volume, dysuria, hematuria and urgency.   Skin:  Negative for color change and rash.   Neurological:  Positive for weakness. Negative for dizziness, tremors, syncope and light-headedness.   Psychiatric/Behavioral:  Negative for confusion and sleep disturbance.    All other systems reviewed and are negative.    Last Recorded Vitals  /67   Pulse 60   Temp 36.8 °C (98.2 °F) (Temporal)   Resp (!) 25   Wt 81.6 kg (180 lb)   SpO2 96%      Physical Exam:  Vital signs and nursing notes reviewed.   Constitutional: Pleasant and cooperative elderly female. Laying in bed in no acute distress.   Skin: Warm and dry; no obvious lesions, rashes, pallor, or jaundice.  Chronic skin changes over both lower extremities  Eyes: EOMI. Anicteric sclera.   ENT: Mucous membranes moist; no obvious injury or deformity appreciated.   Head and Neck: Normocephalic, atraumatic. ROM preserved. Trachea midline.   Respiratory: Nonlabored on 2L NC. Lungs slightly diminished bilaterally with crackles at the bases. Chest rise is equal.  Cardiovascular: RRR. No gross murmur, gallop, or rub. Extremities are warm. No chest wall tenderness. +PPM  GI: Abdomen soft, nontender, nondistended. No obvious organomegaly appreciated.   : No CVA tenderness.   MSK: No gross abnormalities appreciated. Global weakness.   Extremities: Equal BLE edema, nonpitting (baseline per daughter). No cyanosis or clubbing evident. Neurovascularly at baseline.   Neuro: A&Ox3. CN 2-12 grossly intact. Able to respond to questions appropriately. No acute focal neurologic deficits appreciated.  Psych: Appropriate mood and  behavior.    Assessment/Plan     79 y.o. female with PMHx s/f HTN, HLD, CAD, Afib/flutter on warfarin, SSS s/p PPM, chronic diastolic heart failure, pHTN, valvular heart disease, PVD, progressive supranuclear palsy (predominantly immobile; also has issues with dysphagia), obesity, presenting with SOB, cough, congestion, and worsening weakness.    Acute on Chronic Diastolic Heart Failure  -Evidenced by presenting sxs: SOB  -Imaging: as above  -BNP: 956; nothing for comparison however   -Initiate lasix 40mg IVP BID. Of note, patient is supposed to be on metolazone outpatient 3 days a week; unclear if she is actually taking this versus if this is just as needed  -Echocardiogram: Ordered  -Monitor renal function closely with diuresis   -Monitor electrolytes and replenish generously as needed   -Strict I&O’s   -2g salt restriction, 2L fluid restriction   -Monitor fluid status closely   -Cardiology consultation, appreciate further recommendations     Acute Hypoxic Respiratory Failure 2/2 AoC CHF, RSV  -Baseline O2: RA   -Current O2 Requirement: 2L NC  -Continue current management as outlined and wean back to baseline as tolerated   -RT c/s appreciated      RSV  -Hypoxia is likely related to both acute on chronic heart failure and RSV  -No obvious evidence of superimposed bacterial process on imaging or exam, patient is also not describing any infectious complaints presently; I do not see rationale for continuing outpatient antibiotic therapies presently.  Discussed with patient's daughter extensively at the bedside that RSV is a virus and does not inherently require antibiotics, but more so supportive care.  Will be continuing supportive care and management of heart failure as noted above  -Patient with known sick contacts and her grandson and one of her other daughters over this past weekend    Elevated troponin, suspect demand ischemia  -Patient without any chest pain or anginal equivalents  -High-sensitivity troponin  initial 66-70; on admission had repeats which downtrended to 57 and then 55  -Warfarin is therapeutic at 2.8; will continue warfarin dosing.  Follow INR.  Appreciate pharmacy consult for dosing  -No overt ischemic changes on EKG  -Clinically suspect demand mediated in setting of respiratory failure, ADHF, RSV; echocardiogram ordered and will be followed up to rule out wall motion abnormalities or other overt processes.  Monitor on telemetry overnight.  Cards consulted as above.    HTN, HLD, CAD   -BP: slightly elevated on presentation, home therapies will be continued. Close monitoring and adjust as needed.   -HLD: will be continued on home therapies   -CAD: no current reports of chest pain or anginal equivalents. Will be continued on home therapies. Additional management as noted above   -Continued outpatient follow-up with cardiology and PCP as scheduled     History of A-fib/flutter  -Warfarin as above  -Patient with history of sick sinus syndrome; per daughter, they removed her metoprolol recently in the outpatient setting due to persistent bradycardia and has previously had reactions with Cardizem  -Currently sinus    History of sick sinus syndrome s/p PPM  -Reviewed outpatient cardiology notes; patient with fairly long history of issues with her leads but has not had any recent issues  -Continue follow-up as scheduled    Progressive supranuclear palsy  Physical deconditioning, predominant immobility  -Continue home therapies  -Does have episodic dysphagia; out of abundance of caution we will obtain SLP consultation while admitted  -PT/OT, fall precautions    Obesity (BMI 32.92)  -Patient does not meet morbid/severe criteria for obesity on admission   -Continued outpatient follow-up with PCP, healthy dietary and lifestyle changes as able      Diet: Cardiac   DVT Prophylaxis: SCDs, home warfarin   Code Status: Full Code  Case Discussed With: ED provider  Additional Sources Reviewed: Outpatient notes from PCP,  cardiology     Anticipated Length of Stay (LOS): Patient will require 2+ midnights for management of respiratory failure and volume status      Daysi Dove PA-C    Dragon dictation software was used to dictate this note and thus there may be minor errors in translation/transcription including garbled speech or misspellings. Please contact for clarification if needed.

## 2025-01-04 ENCOUNTER — APPOINTMENT (OUTPATIENT)
Dept: CARDIOLOGY | Facility: HOSPITAL | Age: 80
End: 2025-01-04
Payer: MEDICARE

## 2025-01-04 LAB
ALBUMIN SERPL BCP-MCNC: 4.1 G/DL (ref 3.4–5)
ALBUMIN SERPL BCP-MCNC: NORMAL G/DL
ALP SERPL-CCNC: 66 U/L (ref 33–136)
ALP SERPL-CCNC: NORMAL U/L
ALT SERPL W P-5'-P-CCNC: 11 U/L (ref 7–45)
ALT SERPL W P-5'-P-CCNC: NORMAL U/L
ANION GAP SERPL CALC-SCNC: 12 MMOL/L (ref 10–20)
ANION GAP SERPL CALC-SCNC: NORMAL MMOL/L
AORTIC VALVE MEAN GRADIENT: 5 MMHG
AORTIC VALVE PEAK VELOCITY: 1.43 M/S
AST SERPL W P-5'-P-CCNC: 31 U/L (ref 9–39)
AST SERPL W P-5'-P-CCNC: NORMAL U/L
AV PEAK GRADIENT: 8 MMHG
AVA (PEAK VEL): 1.87 CM2
AVA (VTI): 1.77 CM2
BASOPHILS # BLD AUTO: 0.04 X10*3/UL (ref 0–0.1)
BASOPHILS NFR BLD AUTO: 0.7 %
BILIRUB SERPL-MCNC: 1.1 MG/DL (ref 0–1.2)
BILIRUB SERPL-MCNC: NORMAL MG/DL
BUN SERPL-MCNC: 16 MG/DL (ref 6–23)
BUN SERPL-MCNC: NORMAL MG/DL
CALCIUM SERPL-MCNC: 8.9 MG/DL (ref 8.6–10.3)
CALCIUM SERPL-MCNC: NORMAL MG/DL
CHLORIDE SERPL-SCNC: 100 MMOL/L (ref 98–107)
CHLORIDE SERPL-SCNC: NORMAL MMOL/L
CO2 SERPL-SCNC: 32 MMOL/L (ref 21–32)
CO2 SERPL-SCNC: NORMAL MMOL/L
CREAT SERPL-MCNC: 1.04 MG/DL (ref 0.5–1.05)
CREAT SERPL-MCNC: NORMAL MG/DL
EGFRCR SERPLBLD CKD-EPI 2021: 54 ML/MIN/1.73M*2
EGFRCR SERPLBLD CKD-EPI 2021: NORMAL ML/MIN/{1.73_M2}
EJECTION FRACTION APICAL 4 CHAMBER: 57.7
EJECTION FRACTION: 58 %
EOSINOPHIL # BLD AUTO: 0.08 X10*3/UL (ref 0–0.4)
EOSINOPHIL NFR BLD AUTO: 1.4 %
ERYTHROCYTE [DISTWIDTH] IN BLOOD BY AUTOMATED COUNT: 14.7 % (ref 11.5–14.5)
GLUCOSE SERPL-MCNC: 103 MG/DL (ref 74–99)
GLUCOSE SERPL-MCNC: NORMAL MG/DL
HCT VFR BLD AUTO: 33.8 % (ref 36–46)
HGB BLD-MCNC: 9.9 G/DL (ref 12–16)
HOLD SPECIMEN: NORMAL
IMM GRANULOCYTES # BLD AUTO: 0.02 X10*3/UL (ref 0–0.5)
IMM GRANULOCYTES NFR BLD AUTO: 0.3 % (ref 0–0.9)
INR PPP: 2.7 (ref 0.9–1.1)
INR PPP: NORMAL
LEFT ATRIUM VOLUME AREA LENGTH INDEX BSA: 48.7 ML/M2
LEFT VENTRICLE INTERNAL DIMENSION DIASTOLE: 5.22 CM (ref 3.5–6)
LEFT VENTRICULAR OUTFLOW TRACT DIAMETER: 2 CM
LV EJECTION FRACTION BIPLANE: 58 %
LYMPHOCYTES # BLD AUTO: 0.41 X10*3/UL (ref 0.8–3)
LYMPHOCYTES NFR BLD AUTO: 7.1 %
MAGNESIUM SERPL-MCNC: 2.09 MG/DL (ref 1.6–2.4)
MAGNESIUM SERPL-MCNC: NORMAL MG/DL
MCH RBC QN AUTO: 25.8 PG (ref 26–34)
MCHC RBC AUTO-ENTMCNC: 29.3 G/DL (ref 32–36)
MCV RBC AUTO: 88 FL (ref 80–100)
MITRAL VALVE E/A RATIO: 2.01
MONOCYTES # BLD AUTO: 0.5 X10*3/UL (ref 0.05–0.8)
MONOCYTES NFR BLD AUTO: 8.7 %
NEUTROPHILS # BLD AUTO: 4.71 X10*3/UL (ref 1.6–5.5)
NEUTROPHILS NFR BLD AUTO: 81.8 %
NRBC BLD-RTO: 0 /100 WBCS (ref 0–0)
PLATELET # BLD AUTO: 139 X10*3/UL (ref 150–450)
POTASSIUM SERPL-SCNC: 4.2 MMOL/L (ref 3.5–5.3)
POTASSIUM SERPL-SCNC: NORMAL MMOL/L
PROT SERPL-MCNC: 7.1 G/DL (ref 6.4–8.2)
PROT SERPL-MCNC: NORMAL G/DL
PROTHROMBIN TIME: 31.2 SECONDS (ref 9.8–12.8)
PROTHROMBIN TIME: NORMAL S
RBC # BLD AUTO: 3.83 X10*6/UL (ref 4–5.2)
RIGHT VENTRICLE FREE WALL PEAK S': 11.8 CM/S
RIGHT VENTRICLE PEAK SYSTOLIC PRESSURE: 45 MMHG
SODIUM SERPL-SCNC: 140 MMOL/L (ref 136–145)
SODIUM SERPL-SCNC: NORMAL MMOL/L
TRICUSPID ANNULAR PLANE SYSTOLIC EXCURSION: 2.4 CM
WBC # BLD AUTO: 5.8 X10*3/UL (ref 4.4–11.3)

## 2025-01-04 PROCEDURE — 92610 EVALUATE SWALLOWING FUNCTION: CPT | Mod: GN

## 2025-01-04 PROCEDURE — C8929 TTE W OR WO FOL WCON,DOPPLER: HCPCS

## 2025-01-04 PROCEDURE — 99223 1ST HOSP IP/OBS HIGH 75: CPT | Performed by: STUDENT IN AN ORGANIZED HEALTH CARE EDUCATION/TRAINING PROGRAM

## 2025-01-04 PROCEDURE — 36415 COLL VENOUS BLD VENIPUNCTURE: CPT | Performed by: STUDENT IN AN ORGANIZED HEALTH CARE EDUCATION/TRAINING PROGRAM

## 2025-01-04 PROCEDURE — 85025 COMPLETE CBC W/AUTO DIFF WBC: CPT | Performed by: STUDENT IN AN ORGANIZED HEALTH CARE EDUCATION/TRAINING PROGRAM

## 2025-01-04 PROCEDURE — 93306 TTE W/DOPPLER COMPLETE: CPT | Performed by: STUDENT IN AN ORGANIZED HEALTH CARE EDUCATION/TRAINING PROGRAM

## 2025-01-04 PROCEDURE — 85610 PROTHROMBIN TIME: CPT

## 2025-01-04 PROCEDURE — 2500000005 HC RX 250 GENERAL PHARMACY W/O HCPCS: Performed by: STUDENT IN AN ORGANIZED HEALTH CARE EDUCATION/TRAINING PROGRAM

## 2025-01-04 PROCEDURE — 83735 ASSAY OF MAGNESIUM: CPT | Performed by: FAMILY MEDICINE

## 2025-01-04 PROCEDURE — 2500000004 HC RX 250 GENERAL PHARMACY W/ HCPCS (ALT 636 FOR OP/ED): Performed by: STUDENT IN AN ORGANIZED HEALTH CARE EDUCATION/TRAINING PROGRAM

## 2025-01-04 PROCEDURE — 80053 COMPREHEN METABOLIC PANEL: CPT | Performed by: FAMILY MEDICINE

## 2025-01-04 PROCEDURE — 2500000001 HC RX 250 WO HCPCS SELF ADMINISTERED DRUGS (ALT 637 FOR MEDICARE OP): Performed by: INTERNAL MEDICINE

## 2025-01-04 PROCEDURE — 2500000004 HC RX 250 GENERAL PHARMACY W/ HCPCS (ALT 636 FOR OP/ED): Performed by: INTERNAL MEDICINE

## 2025-01-04 PROCEDURE — 96376 TX/PRO/DX INJ SAME DRUG ADON: CPT

## 2025-01-04 PROCEDURE — 2500000002 HC RX 250 W HCPCS SELF ADMINISTERED DRUGS (ALT 637 FOR MEDICARE OP, ALT 636 FOR OP/ED): Performed by: STUDENT IN AN ORGANIZED HEALTH CARE EDUCATION/TRAINING PROGRAM

## 2025-01-04 PROCEDURE — 2060000001 HC INTERMEDIATE ICU ROOM DAILY

## 2025-01-04 PROCEDURE — 80053 COMPREHEN METABOLIC PANEL: CPT | Performed by: STUDENT IN AN ORGANIZED HEALTH CARE EDUCATION/TRAINING PROGRAM

## 2025-01-04 PROCEDURE — 99233 SBSQ HOSP IP/OBS HIGH 50: CPT | Performed by: INTERNAL MEDICINE

## 2025-01-04 PROCEDURE — 96375 TX/PRO/DX INJ NEW DRUG ADDON: CPT

## 2025-01-04 PROCEDURE — 83735 ASSAY OF MAGNESIUM: CPT | Performed by: STUDENT IN AN ORGANIZED HEALTH CARE EDUCATION/TRAINING PROGRAM

## 2025-01-04 PROCEDURE — 85610 PROTHROMBIN TIME: CPT | Performed by: FAMILY MEDICINE

## 2025-01-04 PROCEDURE — 87081 CULTURE SCREEN ONLY: CPT | Mod: PORLAB | Performed by: STUDENT IN AN ORGANIZED HEALTH CARE EDUCATION/TRAINING PROGRAM

## 2025-01-04 PROCEDURE — 2500000001 HC RX 250 WO HCPCS SELF ADMINISTERED DRUGS (ALT 637 FOR MEDICARE OP): Performed by: STUDENT IN AN ORGANIZED HEALTH CARE EDUCATION/TRAINING PROGRAM

## 2025-01-04 PROCEDURE — 2500000002 HC RX 250 W HCPCS SELF ADMINISTERED DRUGS (ALT 637 FOR MEDICARE OP, ALT 636 FOR OP/ED)

## 2025-01-04 RX ORDER — AMLODIPINE BESYLATE 5 MG/1
5 TABLET ORAL DAILY
Status: DISCONTINUED | OUTPATIENT
Start: 2025-01-04 | End: 2025-01-11 | Stop reason: HOSPADM

## 2025-01-04 RX ORDER — GUAIFENESIN 600 MG/1
1200 TABLET, EXTENDED RELEASE ORAL 2 TIMES DAILY
Status: DISCONTINUED | OUTPATIENT
Start: 2025-01-04 | End: 2025-01-11 | Stop reason: HOSPADM

## 2025-01-04 RX ORDER — MENTHOL 7.6 MG
1 LOZENGE MUCOUS MEMBRANE AS NEEDED
COMMUNITY

## 2025-01-04 RX ORDER — WARFARIN 2.5 MG/1
2.5 TABLET ORAL ONCE
Status: COMPLETED | OUTPATIENT
Start: 2025-01-04 | End: 2025-01-04

## 2025-01-04 RX ORDER — METOLAZONE 5 MG/1
2.5 TABLET ORAL DAILY
Status: DISCONTINUED | OUTPATIENT
Start: 2025-01-04 | End: 2025-01-08

## 2025-01-04 RX ORDER — LOSARTAN POTASSIUM 25 MG/1
25 TABLET ORAL DAILY
Status: DISCONTINUED | OUTPATIENT
Start: 2025-01-04 | End: 2025-01-04

## 2025-01-04 RX ADMIN — FLUTICASONE PROPIONATE 2 SPRAY: 50 SPRAY, METERED NASAL at 08:21

## 2025-01-04 RX ADMIN — BENZOCAINE AND MENTHOL 1 LOZENGE: 15; 3.6 LOZENGE ORAL at 15:35

## 2025-01-04 RX ADMIN — DONEPEZIL HYDROCHLORIDE 10 MG: 5 TABLET ORAL at 20:25

## 2025-01-04 RX ADMIN — FUROSEMIDE 40 MG: 10 INJECTION, SOLUTION INTRAMUSCULAR; INTRAVENOUS at 13:20

## 2025-01-04 RX ADMIN — PANTOPRAZOLE SODIUM 40 MG: 40 INJECTION, POWDER, FOR SOLUTION INTRAVENOUS at 07:16

## 2025-01-04 RX ADMIN — FUROSEMIDE 40 MG: 10 INJECTION, SOLUTION INTRAMUSCULAR; INTRAVENOUS at 07:16

## 2025-01-04 RX ADMIN — HUMAN ALBUMIN MICROSPHERES AND PERFLUTREN 0.5 ML: 10; .22 INJECTION, SOLUTION INTRAVENOUS at 13:43

## 2025-01-04 RX ADMIN — Medication 6 L/MIN: at 07:16

## 2025-01-04 RX ADMIN — POLYETHYLENE GLYCOL 3350 17 G: 17 POWDER, FOR SOLUTION ORAL at 08:21

## 2025-01-04 RX ADMIN — AZELASTINE HYDROCHLORIDE 1 SPRAY: 137 SPRAY, METERED NASAL at 08:21

## 2025-01-04 RX ADMIN — ATORVASTATIN CALCIUM 10 MG: 10 TABLET, FILM COATED ORAL at 01:26

## 2025-01-04 RX ADMIN — Medication 4 L/MIN: at 20:30

## 2025-01-04 RX ADMIN — ATORVASTATIN CALCIUM 10 MG: 10 TABLET, FILM COATED ORAL at 09:14

## 2025-01-04 RX ADMIN — Medication 3 MG: at 20:26

## 2025-01-04 RX ADMIN — WARFARIN SODIUM 2.5 MG: 2.5 TABLET ORAL at 20:25

## 2025-01-04 RX ADMIN — AZELASTINE HYDROCHLORIDE 1 SPRAY: 137 SPRAY, METERED NASAL at 20:34

## 2025-01-04 RX ADMIN — BENZOCAINE AND MENTHOL 1 LOZENGE: 15; 3.6 LOZENGE ORAL at 01:29

## 2025-01-04 RX ADMIN — AMLODIPINE BESYLATE 5 MG: 5 TABLET ORAL at 20:25

## 2025-01-04 RX ADMIN — GUAIFENESIN 1200 MG: 600 TABLET ORAL at 20:26

## 2025-01-04 SDOH — SOCIAL STABILITY: SOCIAL INSECURITY: DO YOU FEEL ANYONE HAS EXPLOITED OR TAKEN ADVANTAGE OF YOU FINANCIALLY OR OF YOUR PERSONAL PROPERTY?: NO

## 2025-01-04 SDOH — ECONOMIC STABILITY: HOUSING INSECURITY: AT ANY TIME IN THE PAST 12 MONTHS, WERE YOU HOMELESS OR LIVING IN A SHELTER (INCLUDING NOW)?: NO

## 2025-01-04 SDOH — ECONOMIC STABILITY: TRANSPORTATION INSECURITY: IN THE PAST 12 MONTHS, HAS LACK OF TRANSPORTATION KEPT YOU FROM MEDICAL APPOINTMENTS OR FROM GETTING MEDICATIONS?: NO

## 2025-01-04 SDOH — ECONOMIC STABILITY: HOUSING INSECURITY: IN THE PAST 12 MONTHS, HOW MANY TIMES HAVE YOU MOVED WHERE YOU WERE LIVING?: 0

## 2025-01-04 SDOH — SOCIAL STABILITY: SOCIAL INSECURITY: ARE THERE ANY APPARENT SIGNS OF INJURIES/BEHAVIORS THAT COULD BE RELATED TO ABUSE/NEGLECT?: NO

## 2025-01-04 SDOH — SOCIAL STABILITY: SOCIAL INSECURITY
WITHIN THE LAST YEAR, HAVE YOU BEEN RAPED OR FORCED TO HAVE ANY KIND OF SEXUAL ACTIVITY BY YOUR PARTNER OR EX-PARTNER?: NO

## 2025-01-04 SDOH — ECONOMIC STABILITY: INCOME INSECURITY: IN THE PAST 12 MONTHS HAS THE ELECTRIC, GAS, OIL, OR WATER COMPANY THREATENED TO SHUT OFF SERVICES IN YOUR HOME?: NO

## 2025-01-04 SDOH — SOCIAL STABILITY: SOCIAL INSECURITY: HAVE YOU HAD ANY THOUGHTS OF HARMING ANYONE ELSE?: NO

## 2025-01-04 SDOH — SOCIAL STABILITY: SOCIAL INSECURITY: WITHIN THE LAST YEAR, HAVE YOU BEEN HUMILIATED OR EMOTIONALLY ABUSED IN OTHER WAYS BY YOUR PARTNER OR EX-PARTNER?: NO

## 2025-01-04 SDOH — SOCIAL STABILITY: SOCIAL INSECURITY: WITHIN THE LAST YEAR, HAVE YOU BEEN AFRAID OF YOUR PARTNER OR EX-PARTNER?: NO

## 2025-01-04 SDOH — SOCIAL STABILITY: SOCIAL INSECURITY: DOES ANYONE TRY TO KEEP YOU FROM HAVING/CONTACTING OTHER FRIENDS OR DOING THINGS OUTSIDE YOUR HOME?: NO

## 2025-01-04 SDOH — ECONOMIC STABILITY: FOOD INSECURITY: HOW HARD IS IT FOR YOU TO PAY FOR THE VERY BASICS LIKE FOOD, HOUSING, MEDICAL CARE, AND HEATING?: NOT HARD AT ALL

## 2025-01-04 SDOH — ECONOMIC STABILITY: HOUSING INSECURITY: IN THE LAST 12 MONTHS, WAS THERE A TIME WHEN YOU WERE NOT ABLE TO PAY THE MORTGAGE OR RENT ON TIME?: NO

## 2025-01-04 SDOH — SOCIAL STABILITY: SOCIAL INSECURITY: HAS ANYONE EVER THREATENED TO HURT YOUR FAMILY OR YOUR PETS?: NO

## 2025-01-04 SDOH — SOCIAL STABILITY: SOCIAL INSECURITY: DO YOU FEEL UNSAFE GOING BACK TO THE PLACE WHERE YOU ARE LIVING?: NO

## 2025-01-04 SDOH — ECONOMIC STABILITY: FOOD INSECURITY
WITHIN THE PAST 12 MONTHS, YOU WORRIED THAT YOUR FOOD WOULD RUN OUT BEFORE YOU GOT THE MONEY TO BUY MORE.: PATIENT DECLINED

## 2025-01-04 SDOH — SOCIAL STABILITY: SOCIAL INSECURITY: WERE YOU ABLE TO COMPLETE ALL THE BEHAVIORAL HEALTH SCREENINGS?: YES

## 2025-01-04 SDOH — SOCIAL STABILITY: SOCIAL INSECURITY: HAVE YOU HAD THOUGHTS OF HARMING ANYONE ELSE?: NO

## 2025-01-04 SDOH — SOCIAL STABILITY: SOCIAL INSECURITY: ARE YOU OR HAVE YOU BEEN THREATENED OR ABUSED PHYSICALLY, EMOTIONALLY, OR SEXUALLY BY ANYONE?: NO

## 2025-01-04 SDOH — ECONOMIC STABILITY: FOOD INSECURITY: WITHIN THE PAST 12 MONTHS, THE FOOD YOU BOUGHT JUST DIDN'T LAST AND YOU DIDN'T HAVE MONEY TO GET MORE.: PATIENT DECLINED

## 2025-01-04 SDOH — SOCIAL STABILITY: SOCIAL INSECURITY: ABUSE: ADULT

## 2025-01-04 SDOH — HEALTH STABILITY: MENTAL HEALTH
DO YOU FEEL STRESS - TENSE, RESTLESS, NERVOUS, OR ANXIOUS, OR UNABLE TO SLEEP AT NIGHT BECAUSE YOUR MIND IS TROUBLED ALL THE TIME - THESE DAYS?: PATIENT DECLINED

## 2025-01-04 ASSESSMENT — COGNITIVE AND FUNCTIONAL STATUS - GENERAL
STANDING UP FROM CHAIR USING ARMS: A LITTLE
HELP NEEDED FOR BATHING: A LITTLE
DRESSING REGULAR UPPER BODY CLOTHING: A LITTLE
DAILY ACTIVITIY SCORE: 18
DRESSING REGULAR LOWER BODY CLOTHING: A LITTLE
MOVING TO AND FROM BED TO CHAIR: A LITTLE
MOVING FROM LYING ON BACK TO SITTING ON SIDE OF FLAT BED WITH BEDRAILS: A LITTLE
WALKING IN HOSPITAL ROOM: A LITTLE
CLIMB 3 TO 5 STEPS WITH RAILING: A LITTLE
EATING MEALS: A LITTLE
MOBILITY SCORE: 18
CLIMB 3 TO 5 STEPS WITH RAILING: A LITTLE
TOILETING: A LITTLE
MOVING FROM LYING ON BACK TO SITTING ON SIDE OF FLAT BED WITH BEDRAILS: A LITTLE
DRESSING REGULAR LOWER BODY CLOTHING: A LITTLE
PERSONAL GROOMING: A LITTLE
TURNING FROM BACK TO SIDE WHILE IN FLAT BAD: A LITTLE
TURNING FROM BACK TO SIDE WHILE IN FLAT BAD: A LITTLE
WALKING IN HOSPITAL ROOM: A LITTLE
PERSONAL GROOMING: A LITTLE
TOILETING: A LITTLE
DAILY ACTIVITIY SCORE: 18
EATING MEALS: A LITTLE
STANDING UP FROM CHAIR USING ARMS: A LITTLE
DRESSING REGULAR UPPER BODY CLOTHING: A LITTLE
HELP NEEDED FOR BATHING: A LITTLE

## 2025-01-04 ASSESSMENT — PATIENT HEALTH QUESTIONNAIRE - PHQ9
1. LITTLE INTEREST OR PLEASURE IN DOING THINGS: NOT AT ALL
2. FEELING DOWN, DEPRESSED OR HOPELESS: NOT AT ALL
SUM OF ALL RESPONSES TO PHQ9 QUESTIONS 1 & 2: 0

## 2025-01-04 ASSESSMENT — PAIN SCALES - GENERAL
PAINLEVEL_OUTOF10: 0 - NO PAIN
PAINLEVEL_OUTOF10: 5 - MODERATE PAIN
PAINLEVEL_OUTOF10: 0 - NO PAIN

## 2025-01-04 ASSESSMENT — LIFESTYLE VARIABLES
HOW OFTEN DO YOU HAVE A DRINK CONTAINING ALCOHOL: NEVER
HOW OFTEN DO YOU HAVE 6 OR MORE DRINKS ON ONE OCCASION: NEVER
AUDIT-C TOTAL SCORE: 0
AUDIT-C TOTAL SCORE: 0
HOW MANY STANDARD DRINKS CONTAINING ALCOHOL DO YOU HAVE ON A TYPICAL DAY: PATIENT DOES NOT DRINK
SKIP TO QUESTIONS 9-10: 1

## 2025-01-04 ASSESSMENT — PAIN - FUNCTIONAL ASSESSMENT
PAIN_FUNCTIONAL_ASSESSMENT: 0-10

## 2025-01-04 ASSESSMENT — COLUMBIA-SUICIDE SEVERITY RATING SCALE - C-SSRS
2. HAVE YOU ACTUALLY HAD ANY THOUGHTS OF KILLING YOURSELF?: NO
6. HAVE YOU EVER DONE ANYTHING, STARTED TO DO ANYTHING, OR PREPARED TO DO ANYTHING TO END YOUR LIFE?: NO
1. IN THE PAST MONTH, HAVE YOU WISHED YOU WERE DEAD OR WISHED YOU COULD GO TO SLEEP AND NOT WAKE UP?: NO

## 2025-01-04 ASSESSMENT — ACTIVITIES OF DAILY LIVING (ADL): LACK_OF_TRANSPORTATION: NO

## 2025-01-04 NOTE — PROGRESS NOTES
Bethany Espinoza is a 79 y.o. female admitted for Acute hypoxic respiratory failure (Multi). Pharmacy reviewed the patient's [id number] medications and allergies for accuracy.    The list below reflects the PTA list prior to pharmacy medication history. A summary a changes to the PTA medication list has been listed below. Please review each medication in order reconciliation for additional clarification and justification.    Source of information:  T2P and dtr    Medications added:  Cough drops prn  Metamucil every day   Refresh eyedrops 1 gtt ou prn    Medications modified:  Warfarin 2.5mg --> 2.5mg every day     Medications to be removed:  Azelastine 0.1%  Donepezil 5mg  Metoprolol Succ 50mg    Medications of concern:      Prior to Admission Medications   Prescriptions Last Dose Informant Patient Reported? Taking?   amoxicillin-pot clavulanate (Augmentin) 875-125 mg tablet   Yes Yes   Sig: Take 1 tablet (875 mg) by mouth twice a day.   atorvastatin (Lipitor) 10 mg tablet   Yes No   Sig: Take 1 tablet (10 mg) by mouth once daily.   azelastine (Astelin) 137 mcg (0.1 %) nasal spray   Yes No   Sig: Administer 1 spray into each nostril 2 times a day.   donepezil (Aricept) 5 mg tablet   Yes Yes   Sig: Take 2 tablets (10 mg) by mouth once daily at bedtime.   fluticasone (Flonase) 50 mcg/actuation nasal spray   Yes No   Sig: Administer 2 sprays into each nostril once daily as needed.   furosemide (Lasix) 20 mg tablet   Yes No   Sig: Take 1 tablet (20 mg) by mouth once daily.   ipratropium-albuteroL (Duo-Neb) 0.5-2.5 mg/3 mL nebulizer solution   Yes Yes   Sig: Take 3 mL by nebulization 4 times a day as needed for wheezing or shortness of breath.   metOLazone (Zaroxolyn) 2.5 mg tablet   Yes No   Sig: Take 1 tablet (2.5 mg) by mouth 3 (three) times a week. Monday/Wednesday/Friday and as needed for weight gain more than 3lbs   metoprolol succinate XL (Toprol-XL) 50 mg 24 hr tablet   Yes No   Sig: Take 1 tablet (50 mg)  by mouth once daily.   warfarin (Jantoven) 2.5 mg tablet   Yes No   Sig: Take 1 tablet (2.5 mg) by mouth.      Facility-Administered Medications: None       Cony Wilkinson

## 2025-01-04 NOTE — ED NOTES
Pt arrives to ED via ambulance from home with c/o shortness of breath and weakness.    Code Status:  Full Code    HPI     Chief Complaint   Patient presents with    Shortness of Breath     Seen at [facility] ED Monday, started on abx. Not getting better, grandson at home has RSV.       /75 (BP Location: Left arm, Patient Position: Sitting)   Pulse 60   Temp 36.8 °C (98.2 °F) (Temporal)   Resp (!) 22   Wt 81.6 kg (180 lb)   SpO2 97%     [place] Coma Scale Score: 15      LDA:   Peripheral IV 12/20/24 20 G Left Wrist (Active)   Placement Date: 12/20/24   Size (Gauge): 20 G  Orientation: Left  Location: Wrist  Site Prep: Chlorhexidine    Number of days: 15        BACKGROUND  No past medical history on file.  No past surgical history on file.  No current facility-administered medications on file prior to encounter.     Current Outpatient Medications on File Prior to Encounter   Medication Sig Dispense Refill    amoxicillin-pot clavulanate (Augmentin) 875-125 mg tablet Take 1 tablet (875 mg) by mouth twice a day.      donepezil (Aricept) 5 mg tablet Take 2 tablets (10 mg) by mouth once daily at bedtime.      ipratropium-albuteroL (Duo-Neb) 0.5-2.5 mg/3 mL nebulizer solution Take 3 mL by nebulization 4 times a day as needed for wheezing or shortness of breath.      atorvastatin (Lipitor) 10 mg tablet Take 1 tablet (10 mg) by mouth once daily.      azelastine (Astelin) 137 mcg (0.1 %) nasal spray Administer 1 spray into each nostril 2 times a day.      fluticasone (Flonase) 50 mcg/actuation nasal spray Administer 2 sprays into each nostril once daily as needed.      furosemide (Lasix) 20 mg tablet Take 1 tablet (20 mg) by mouth once daily.      metOLazone (Zaroxolyn) 2.5 mg tablet Take 1 tablet (2.5 mg) by mouth 3 (three) times a week. Monday/Wednesday/Friday and as needed for weight gain more than 3lbs      metoprolol succinate XL (Toprol-XL) 50 mg 24 hr tablet Take 1 tablet (50 mg) by mouth once daily.       warfarin (Jantoven) 2.5 mg tablet Take 1 tablet (2.5 mg) by mouth.          ASSESSMENT  ED Course as of 01/04/25 0953   Fri Jan 03, 2025   1423 EKG, interpreted by me: Sinus rhythm, rate 60 bpm.  Left axis. Diffuse ST elevation throughout. No significant reciprocal ST depression. Wide complex QRS. . No evidence of acute STEMI. [JG]   1443 Troponin I, High Sensitivity(!!): 66 [JG]   1523 RSV PCR(!): Detected [JG]   1543 Troponin I, High Sensitivity(!!): 70 [JG]   1742 Cardiologist Dr. Mcnulty consulted for patient to be admitted with CHF exacerbation and NSTEMI with uptrending troponins already on anticoagulation. Per Dr. Mcnulty, ok to trend troponins, no need to switch anticoagulation at this time and no need to start heparin gtt. Ok to continue home warfarin if therapeutic. [JG]      ED Course User Index  [JG] Camila Schwartz MD         Diagnoses as of 01/04/25 0953   Acute hypoxic respiratory failure (Multi)       Medications Currently Running:        Medications Given:  ED Medication Administration from 01/03/2025 1356 to 01/04/2025 0953         Date/Time Order Dose Route Action Action by     01/03/2025 1925 EST oxygen (O2) therapy 2 L/min inhalation Start Mervin, K     01/03/2025 1925 EST polyethylene glycol (Glycolax, Miralax) packet 17 g 17 g oral Not Given Mervin, K     01/03/2025 1930 EST oxygen (O2) therapy 2 L/min inhalation Start Mervin, K     01/03/2025 1932 EST furosemide (Lasix) injection 40 mg 40 mg intravenous Given Mervin, K     01/03/2025 1939 EST warfarin (Coumadin) tablet 2.5 mg 2.5 mg oral Given Mervin, K     01/03/2025 1941 EST fluticasone (Flonase) nasal spray 2 spray 2 spray Each Nostril Given Mervin, K     01/03/2025 2036 EST donepezil (Aricept) tablet 10 mg 10 mg oral Not Given Mervin, K     01/03/2025 2337 EST azelastine (Astelin) 137 mcg (0.1 %) nasal spray 1 spray 1 spray Each Nostril Not Given Mervin, K     01/04/2025 0126 EST atorvastatin (Lipitor) tablet 10 mg  10 mg oral Given Mervin, K     01/04/2025 0129 EST benzocaine-menthol (Cepastat Sore Throat) lozenge 1 lozenge 1 lozenge Mouth/Throat Given Mervin, K     01/04/2025 0716 EST furosemide (Lasix) injection 40 mg 40 mg intravenous Given Johnston, KARIN     01/04/2025 0716 EST oxygen (O2) therapy 6 L/min inhalation Start Johnston, KARIN     01/04/2025 0716 EST pantoprazole (ProtoNix) EC tablet 40 mg -- oral See Alternative Johnston, KARIN     01/04/2025 0716 EST pantoprazole (ProtoNix) injection 40 mg 40 mg intravenous Given Johnston, KARIN     01/04/2025 0821 EST azelastine (Astelin) 137 mcg (0.1 %) nasal spray 1 spray 1 spray Each Nostril Given JohnstonKARIN     01/04/2025 0821 EST fluticasone (Flonase) nasal spray 2 spray 2 spray Each Nostril Given JohnstonKARIN     01/04/2025 0821 EST polyethylene glycol (Glycolax, Miralax) packet 17 g 17 g oral Given Johnston, KARIN     01/04/2025 0914 EST atorvastatin (Lipitor) tablet 10 mg 10 mg oral Given Johnston, KARIN                 RESULTS    Imaging:  XR chest 1 view   Final Result   1.  Vascular congestion with cardiomegaly.   Signed by Yoni Wilkes MD      Transthoracic Echo (TTE) Complete    (Results Pending)      }  Labs ::99  Abnormal Labs Reviewed   CBC WITH AUTO DIFFERENTIAL - Abnormal; Notable for the following components:       Result Value    Hemoglobin 10.8 (*)     MCHC 30.0 (*)     RDW 14.6 (*)     Platelets 149 (*)     Lymphocytes Absolute 0.49 (*)     All other components within normal limits   COMPREHENSIVE METABOLIC PANEL - Abnormal; Notable for the following components:    Glucose 121 (*)     All other components within normal limits   B-TYPE NATRIURETIC PEPTIDE - Abnormal; Notable for the following components:     (*)     All other components within normal limits    Narrative:        <100 pg/mL - Heart failure unlikely                  100-299 pg/mL - Intermediate probability of acute heart                                  failure exacerbation. Correlate with clinical                                   context and patient history.                    >=300 pg/mL - Heart Failure likely. Correlate with clinical                                  context and patient history.                                    BNP testing is performed using different testing methodology at [facility] than at other [facility] hospitals. Direct result comparisons should only be made within the same method.                      BLOOD GAS VENOUS - Abnormal; Notable for the following components:    POCT pO2, Venous 55 (*)     POCT SO2, Venous 83 (*)     POCT Oxy Hemoglobin, Venous 81.6 (*)     POCT HCO3 Calculated, Venous 27.2 (*)     All other components within normal limits   RSV PCR - Abnormal; Notable for the following components:    RSV PCR Detected (*)     All other components within normal limits    Narrative:     This assay is an FDA-cleared, in vitro diagnostic nucleic acid amplification test for the detection of RSV from nasopharyngeal specimens, and has been validated for use at [facility]. Negative results do not preclude RSV infections, and should not be used as the sole basis for diagnosis, treatment, or other management decisions. If Influenza A/B and RSV PCR results are negative, testing for Parainfluenza virus, Adenovirus and Metapneumovirus is routinely performed for pediatric oncology and intensive care inpatients at [facility], and is available on other patients by placing an add-on request.                                       URINALYSIS WITH REFLEX CULTURE AND MICROSCOPIC - Abnormal; Notable for the following components:    Appearance, Urine Ex.Turbid (*)     Specific Gravity, Urine 1.043 (*)     Protein, Urine 100 (2+) (*)     Blood, Urine 0.2 (2+) (*)     Urobilinogen, Urine 2 (1+) (*)     All other components within normal limits   SERIAL TROPONIN-INITIAL - Abnormal; Notable for the following components:    Troponin I, High Sensitivity 66 (*)     All other  components within normal limits    Narrative:     Less than 99th percentile of normal range cutoff-                  Female and children under 18 years old <14 ng/L; Male <21 ng/L: Negative                  Repeat testing should be performed if clinically indicated.                                     Female and children under 18 years old 14-50 ng/L; Male 21-50 ng/L:                  Consistent with possible cardiac damage and possible increased clinical                   risk. Serial measurements may help to assess extent of myocardial damage.                                     >50 ng/L: Consistent with cardiac damage, increased clinical risk and                  myocardial infarction. Serial measurements may help assess extent of                   myocardial damage.                                      NOTE: Children less than 1 year old may have higher baseline troponin                   levels and results should be interpreted in conjunction with the overall                   clinical context.                                     NOTE: Troponin I testing is performed using a different                   testing methodology at [facility] than at other                   [facility]. Direct result comparisons should only                   be made within the same method.   SERIAL TROPONIN, 1 HOUR - Abnormal; Notable for the following components:    Troponin I, High Sensitivity 70 (*)     All other components within normal limits    Narrative:     Less than 99th percentile of normal range cutoff-                  Female and children under 18 years old <14 ng/L; Male <21 ng/L: Negative                  Repeat testing should be performed if clinically indicated.                                     Female and children under 18 years old 14-50 ng/L; Male 21-50 ng/L:                  Consistent with possible cardiac damage and possible increased clinical                   risk. Serial measurements may  help to assess extent of myocardial damage.                                     >50 ng/L: Consistent with cardiac damage, increased clinical risk and                  myocardial infarction. Serial measurements may help assess extent of                   myocardial damage.                                      NOTE: Children less than 1 year old may have higher baseline troponin                   levels and results should be interpreted in conjunction with the overall                   clinical context.                                     NOTE: Troponin I testing is performed using a different                   testing methodology at [facility] than at other                   [facility]. Direct result comparisons should only                   be made within the same method.   PROTIME-INR - Abnormal; Notable for the following components:    Protime 32.4 (*)     INR 2.8 (*)     All other components within normal limits   TROPONIN I, HIGH SENSITIVITY - Abnormal; Notable for the following components:    Troponin I, High Sensitivity 57 (*)     All other components within normal limits    Narrative:     Less than 99th percentile of normal range cutoff-                  Female and children under 18 years old <14 ng/L; Male <21 ng/L: Negative                  Repeat testing should be performed if clinically indicated.                                     Female and children under 18 years old 14-50 ng/L; Male 21-50 ng/L:                  Consistent with possible cardiac damage and possible increased clinical                   risk. Serial measurements may help to assess extent of myocardial damage.                                     >50 ng/L: Consistent with cardiac damage, increased clinical risk and                  myocardial infarction. Serial measurements may help assess extent of                   myocardial damage.                                      NOTE: Children less than 1 year old may have  higher baseline troponin                   levels and results should be interpreted in conjunction with the overall                   clinical context.                                     NOTE: Troponin I testing is performed using a different                   testing methodology at [facility] than at other                   [facility]. Direct result comparisons should only                   be made within the same method.   TROPONIN I, HIGH SENSITIVITY - Abnormal; Notable for the following components:    Troponin I, High Sensitivity 55 (*)     All other components within normal limits    Narrative:     Less than 99th percentile of normal range cutoff-                  Female and children under 18 years old <14 ng/L; Male <21 ng/L: Negative                  Repeat testing should be performed if clinically indicated.                                     Female and children under 18 years old 14-50 ng/L; Male 21-50 ng/L:                  Consistent with possible cardiac damage and possible increased clinical                   risk. Serial measurements may help to assess extent of myocardial damage.                                     >50 ng/L: Consistent with cardiac damage, increased clinical risk and                  myocardial infarction. Serial measurements may help assess extent of                   myocardial damage.                                      NOTE: Children less than 1 year old may have higher baseline troponin                   levels and results should be interpreted in conjunction with the overall                   clinical context.                                     NOTE: Troponin I testing is performed using a different                   testing methodology at [facility] than at other                   [facility]. Direct result comparisons should only                   be made within the same method.   CBC WITH AUTO DIFFERENTIAL - Abnormal; Notable for the  following components:    RBC 3.83 (*)     Hemoglobin 9.9 (*)     Hematocrit 33.8 (*)     MCH 25.8 (*)     MCHC 29.3 (*)     RDW 14.7 (*)     Platelets 139 (*)     Lymphocytes Absolute 0.41 (*)     All other components within normal limits   URINALYSIS MICROSCOPIC WITH REFLEX CULTURE - Abnormal; Notable for the following components:    RBC, Urine >20 (*)     Calcium Oxalate Crystals, Urine 4+ (*)     All other components within normal limits                 Yohana Rodriguez RN  01/04/25 5310

## 2025-01-04 NOTE — CONSULTS
Inpatient consult to Cardiology  Consult performed by: Sd Mcnulty MD  Consult ordered by: Daysi Dove PA-C  Reason for consult: Elevated troponin,?  CHF  Assessment/Recommendations: See full note        History Of Present Illness:    Bethany Espinoza is a 79 y.o. female with PMHx s/f HTN, HLD, CAD, permanent atrial fibrillation on warfarin, SSS s/p PPM, chronic diastolic heart failure, mild pHTN, MS with MR, PVD, progressive supranuclear palsy (predominantly immobile; also has issues with dysphagia), obesity, presenting with SOB, cough, congestion, and worsening weakness. In short, pt was in her typical state of health until Monday (12/30/24) when she became congested and more short of breath than usual.  Patient and family was concerned this may have been viral or sinus infection as her grandson had visited and was just getting over RSV. Patient progressively worsened and patient came to hospital.  She saw the PCP in the interim and was prescribed with antibiotic.  Subsequently patient continued to deteriorate with worsening shortness of breath and eventually came to hospital.  She is compliant with her medical therapy.  She does not recall all of her medications though.      Outside cardiology records were reviewed.  Mrs Espinoza has a history of sick sinus syndrome and underwent a dual-chamber pacemaker implantation in 2009. At some point since her pacer implantation, she developed right ventricular pacemaker lead malfunction and this required lead replacement in February 2011. Around the same time, she became symptomatic with atrial fibrillation and required sotalol. She was later switched to Dofetilide. She developed of lead infection in 2011 and a pacemaker was removed. Subsequently, in December 2015, she underwent a single-chamber pacemaker placement for suspicion of bradycardia-induced symptoms of congestive heart failure.   In October 2018, she underwent coronary angiography which did not reveal  significant coronary artery disease, but she had moderate to moderately severe mitral regurgitation. She subsequently underwent a transesophageal echocardiogram by me in November 2018, and this revealed that she only had mild mitral regurgitation (Vena contract a measured 3 mm, the regurgitant orifice area by the proximal isovelocity hemispheric surface area method was 0.17 cm2).  She had a bout of congestive heart failure exacerbation in May 2020. Apparently she was prescribed metolazone for 3 days by her primary care provider. She said that she lost 11 pounds of water weight after she did that. She was also prescribed potassium with the Lasix. Her echocardiogram in 9/21 revealed an LV ejection fraction of 55%, normal right ventricular systolic function, with a dilated right ventricle. She has severe biatrial enlargement, mild mitral, mild to moderate tricuspid regurgitation.    She is currently taking Lasix 20 mg every day, along with metolazone only as necessary on Mondays Wednesdays and Fridays, if she has a weight gain of greater than 3 pounds in a day.    Mitral stenosis - ICD9: 394.9, ICD10: I34.2  Her echocardiogram in July 2017 revealed that she only had mild mitral stenosis without significant mitral insufficiency. Based on her echocardiogram in 12/23, she only had trace to mild mitral regurgitation, without significant mitral stenosis. This degree of disease only needs longitudinal follow-up for now.     TTE 13/2023  CONCLUSIONS:   - Technically difficult exam due to body habitus and suboptimal positioning.   - Exam indication: Evaluation of known heart failure to guide therapy   - The left ventricle is normal in size. Left ventricular systolic function is   mildly decreased. EF = 51 ± 5% (2D biplane) Definity contrast used for endocardial    border detection.   - The right ventricle is dilated. Right ventricular systolic function is mildly   decreased.   - The left atrial cavity is severely dilated.   -  The right atrial cavity is moderately dilated.   - There is moderate (2+) tricuspid valve regurgitation.   - Exam was compared with the prior  echocardiographic exam performed on 9/10/21,    There is no significant change.       At this admission her lab work showed    Troponin flat 66> 70> 57> 55    EKG shows atrial flutter, right bundle branch block    BNP elevated at 975    Patient with INR in therapeutic range.    Anemia with a hemoglobin of 9.9, thrombocytopenia platelet of 139.    RSV PCR is detected.    Chest x-ray with mild vascular congestion and cardiomegaly.    On arrival she was hypertensive with a blood pressure of 182/83 and satting 96% on supplemental oxygen.  Patient was tachypneic with respiratory rate of 30 and heart rate of 60 bpm.    Cardiology consulted for heart failure exacerbation.    Last Recorded Vitals:  Vitals:    01/04/25 0900 01/04/25 0930 01/04/25 1000 01/04/25 1030   BP: 124/71 175/75 150/90 146/68   BP Location: Left arm Left arm Left arm Left arm   Patient Position: Sitting Sitting Sitting Sitting   Pulse: 60 60 60 60   Resp: 20 (!) 22 18 20   Temp:       TempSrc:       SpO2: 97% 97% 97% 97%   Weight:       Height:           Last Labs:  CBC - 1/4/2025:  6:13 AM  5.8 9.9 139    33.8      CMP - 1/3/2025:  2:36 PM  9.1 6.8 19 --- 1.0   _ 4.1 10 68      PTT - No results in last year.  2.7   31.2 _     Troponin I, High Sensitivity   Date/Time Value Ref Range Status   01/03/2025 07:42 PM 55 (HH) 0 - 13 ng/L Final     Comment:     Previous result verified on 1/3/2025 1516 on specimen/case [id number] called with component TRPHS for procedure Troponin I, High Sensitivity, Initial with value 66 ng/L.   01/03/2025 05:43 PM 57 (HH) 0 - 13 ng/L Final     Comment:     Previous result verified on 1/3/2025 1516 on specimen/case [id number] called with component TRPHS for procedure Troponin I, High Sensitivity, Initial with value 66 ng/L.   01/03/2025 03:23 PM 70 (HH) 0 - 13 ng/L Final     " Comment:     Previous result verified on 1/3/2025 1516 on specimen/case [id number] called with component [facility] for procedure Troponin I, High Sensitivity, Initial with value 66 ng/L.     BNP   Date/Time Value Ref Range Status   01/03/2025 02:36  (H) 0 - 99 pg/mL Final   10/17/2023 11:22  (H) 0 - 99 pg/mL Final      Last I/O:  I/O last 3 completed shifts:  In: - (0 mL/kg)   Out: 2300 (28.2 mL/kg) [Urine:2300 (0.8 mL/kg/hr)]  Weight: 81.6 kg     Past Cardiology Tests (Last 3 Years):  EKG:  No results found for this or any previous visit from the past 1095 days.    Echo:  No results found for this or any previous visit from the past 1095 days.    Ejection Fractions:  No results found for: \"EF\"  Cath:  No results found for this or any previous visit from the past 1095 days.    Stress Test:  No results found for this or any previous visit from the past 1095 days.    Cardiac Imaging:  No results found for this or any previous visit from the past 1095 days.      Past Medical History:  She has no past medical history on file.    Past Surgical History:  She has no past surgical history on file.      Social History:  She reports that she has never smoked. She has never used smokeless tobacco. No history on file for alcohol use and drug use.    Family History:  No family history on file.     Allergies:  Ace inhibitors, Diltiazem, Meperidine, Solifenacin, Sotalol, and Tolterodine    Inpatient Medications:  Scheduled medications   Medication Dose Route Frequency    atorvastatin  10 mg oral Daily    azelastine  1 spray Each Nostril BID    donepezil  10 mg oral Nightly    fluticasone  2 spray Each Nostril Daily    furosemide  40 mg intravenous BID    oxygen   inhalation Continuous - Inhalation    pantoprazole  40 mg oral Daily before breakfast    Or    pantoprazole  40 mg intravenous Daily before breakfast    perflutren protein A microsphere  0.5 mL intravenous Once in imaging    polyethylene glycol  17 g oral " Daily     PRN medications   Medication    acetaminophen    albuterol    alteplase    benzocaine-menthol    bisacodyl    bisacodyl    guaiFENesin    heparin flush    heparin flush    melatonin    ondansetron    Or    ondansetron     Continuous Medications   Medication Dose Last Rate     Outpatient Medications:  Current Outpatient Medications   Medication Instructions    amoxicillin-pot clavulanate (Augmentin) 875-125 mg tablet 875 mg, 2 times daily    atorvastatin (Lipitor) 10 mg tablet 1 tablet, oral, Daily    azelastine (Astelin) 137 mcg (0.1 %) nasal spray 1 spray, Each Nostril, 2 times daily    donepezil (ARICEPT) 10 mg, Nightly    fluticasone (Flonase) 50 mcg/actuation nasal spray 2 sprays, Each Nostril, Daily PRN    furosemide (LASIX) 20 mg, oral, Daily    ipratropium-albuteroL (Duo-Neb) 0.5-2.5 mg/3 mL nebulizer solution 3 mL, 4 times daily PRN    metOLazone (ZAROXOLYN) 2.5 mg, oral, 3 times weekly, Monday/Wednesday/Friday and as needed for weight gain more than 3lbs    metoprolol succinate XL (TOPROL-XL) 50 mg, oral, Daily    warfarin (JANTOVEN) 2.5 mg, oral       Physical Exam:  General: Alert and Oriented, No distress, cooperative  Head: Normocephalic without obvious abnormality, atraumatic  Eyes: Conjunctiva/corneas clear, EOM's grossly intact  Neck: Supple, trachea midline, No thyroid enlargement/tenderness/nodules; No JVD  Lungs: Bilateral wheeze and rhonchi, reduced air entry bilaterally, minimal Rales   chest Wall: No tenderness or deformity  Heart: Irregular rhythm, normal S1/S2, holosystolic murmur at left lower sternal border  Abdomen: Soft, non-tender, Non-distended, bowel sounds active  Extremities: 1+ edema, no cyanosis, no clubbing  Skin: Skin color, texture, turgor normal.  No rashes or lesions noted  Neurologic: Alert and oriented x 3, speech intact       Assessment/Plan   Acute decompensated heart failure-diastolic heart failure  Acute hypoxic failure  RSV infection/pneumonia  Abnormal  troponin elevation  Essential hypertension  Hyperlipidemia  Permanent atrial fibrillation  Sick sinus syndrome status post PPM  Progressive supranuclear palsy    Plan  -Suspect acute decompensation in the setting of underlying viral illness.  -Abnormal troponin elevation not suggestive of ACS.  This is likely related to underlying CHF/atypical pneumonia.  Discontinue checking when downtrended.  -Obtain TTE to evaluate for any new structural abnormalities.  -Suggest Lasix 40 mg IV twice daily.  -Suggest giving metolazone 2.5 mg daily at this time and when clinical status is stable we could then resume 3 times a week as needed as previously taking.  -Continue Coumadin for anticoagulation.  -Resume home metoprolol.  -Add low-dose losartan for better blood pressure control.  -Management of RSV and other medical condition as per primary team.  -Telemetry monitoring.  Daily weight.  Strict I's and O's.  -We will consider cardioversion for a flutter if clinical status does not improve.    Cardiology will follow please call with any questions.      Peripheral IV 12/20/24 20 G Left Wrist (Active)   Site Assessment Clean;Dry;Intact 01/03/25 1445   Dressing Type Transparent 01/03/25 1445   Line Status Flushed 01/03/25 1445   Dressing Status Clean;Dry;Occlusive 01/03/25 1445   Number of days: 15       Code Status:  Full Code          Sd Mcnulty MD

## 2025-01-04 NOTE — PROGRESS NOTES
"Pharmacy Consult for Warfarin (Coumadin) Management - Initial Consult Note     Bethany Espinoza is a 79 y.o. female admitted for Acute hypoxic respiratory failure (Multi). Pharmacy was consulted for warfarin dosing and monitoring.         Labs  INR   Date Value Ref Range Status   01/04/2025 2.7 (H) 0.9 - 1.1 Final   01/04/2025   Corrected     Comment:     Redrawn for possible contamination  Corrected result: Previously reported as 2.9 (reference range: 0.9-1.1) on 1/4/2025 at 0538 EST.   01/03/2025 2.8 (H) 0.9 - 1.1 Final     Protime   Date Value Ref Range Status   01/04/2025 31.2 (H) 9.8 - 12.8 seconds Final   01/04/2025   Corrected     Comment:     Redrawn for possible contamination  Corrected result: Previously reported as 32.6 seconds (reference range: 9.8-12.8 seconds) on 1/4/2025 at 0538 EST.   01/03/2025 32.4 (H) 9.8 - 12.8 seconds Final     Hemoglobin   Date Value Ref Range Status   01/04/2025 9.9 (L) 12.0 - 16.0 g/dL Final     Hematocrit   Date Value Ref Range Status   01/04/2025 33.8 (L) 36.0 - 46.0 % Final     Platelets   Date Value Ref Range Status   01/04/2025 139 (L) 150 - 450 x10*3/uL Final     No results found for: \"PTT\"    Warfarin Indication: Atrial fibrillation or flutter  Target INR: 2 - 3  Home regimen: 2.5 mg daily      Plan     Administer 2.5 mg today per home regimen    Orders placed per pharmacy consult. Pharmacy will continue to monitor and adjust therapy as needed.     Huseyin Davis, PharmD  "

## 2025-01-04 NOTE — CARE PLAN
The patient's goals for the shift include      The clinical goals for the shift include pt will maintain o2 +90

## 2025-01-04 NOTE — PROGRESS NOTES
Emergency Medicine Transition of Care Note.    I received Bethany Espinoza in signout from Dr. Rose.  Please see the previous ED provider note for all HPI, PE and MDM up to the time of signout. This is in addition to the primary record.    In brief Bethany Espinoza is an 79 y.o. female presenting for   Chief Complaint   Patient presents with    Shortness of Breath     Seen at [facility] ED Monday, started on abx. Not getting better, grandson at home has RSV.        ED Course as of 01/05/25 1508   Fri Jan 03, 2025   1423 EKG, interpreted by me: Sinus rhythm, rate 60 bpm.  Left axis. Diffuse ST elevation throughout. No significant reciprocal ST depression. Wide complex QRS. . No evidence of acute STEMI. [JG]   1443 Troponin I, High Sensitivity(!!): 66 [JG]   1523 RSV PCR(!): Detected [JG]   1543 Troponin I, High Sensitivity(!!): 70 [JG]   1742 Cardiologist Dr. Mcnulty consulted for patient to be admitted with CHF exacerbation and NSTEMI with uptrending troponins already on anticoagulation. Per Dr. Mcnulty, ok to trend troponins, no need to switch anticoagulation at this time and no need to start heparin gtt. Ok to continue home warfarin if therapeutic. [JG]      ED Course User Index  [JG] Camila Schwartz MD         Diagnoses as of 01/05/25 1508   Acute hypoxic respiratory failure (Multi) - continue oxygen therapy, wean as possible, manage underlying CHF and RSV infection       Medical Decision Making  Received patient in signout at this time. Patient is admitted to [facility] for RSV with new oxygen requirement and type II NSTEMI. Boarding in the emergency department at this time pending bed availability secondary to high patient volumes.  No acute concerns were vocalized and no significant events occurred while under my care.    Final diagnoses:   [J96.01] Acute hypoxic respiratory failure (Multi)       Procedure  Procedures    Camila Schwartz MD

## 2025-01-04 NOTE — PROGRESS NOTES
Speech-Language Pathology    SLP Adult Inpatient Speech-Language Pathology Clinical Swallow Evaluation    Patient Name: Bethany Espinoza  MRN: [id number]  Today's Date: 1/4/2025         Current Problem:   1. Acute hypoxic respiratory failure (Multi)        2. Elevated troponin  Transthoracic Echo (TTE) Complete    Transthoracic Echo (TTE) Complete      3. ALONSO (dyspnea on exertion)  Transthoracic Echo (TTE) Complete    Transthoracic Echo (TTE) Complete      4. Acute on chronic congestive heart failure, unspecified heart failure type  Transthoracic Echo (TTE) Complete    Transthoracic Echo (TTE) Complete        Recommendations:  Risk for Aspiration: Yes- MBS completed at [facility] 12/17/24 showed PENETRATION with both Mildly thickened and thin liquids before the swallow and during the swallow as well as ASPIRATION (trace amount) of thin before the swallow and during the swallow with no effort made to eject. Additionally, partial Anterior hyoid excursion and partial laryngeal elevation as well as partial PES distension/duration and esophageal retention. Given this information and the pt's overall state and overt s/s (immediate cough and wet vocal quality) of Mildly Thickened water during CSE, it is recommended pt remain on Thin liquids with strict swallow precautions as stated below to prevent buildup of oropharyngeal residues and decrease risk of aspiration. SLP to further assess pt's safest and least restrictive diet with solids as pt is able to tolerate.   Additional Recommendations: Dysphagia treatment  Solid Diet Recommendations : Soft & bite sized/chopped (IDDSI Level 6)  Liquid Diet Recommendations: Thin (IDDSI Level 0)  Compensatory Swallowing Strategies: Decrease distractions during eating/feeding, Upright 90 degrees as possible for all oral intake, Remain upright for 20-30 minutes after meals, Full supervision with meals, One to one assist with meals, Alternate solids and liquids, Swallow 2 times per  bite/sip, Single sips, Small bites/sips, Eat/feed slowly, Effortful swallow, Add moisture to solids  Dysphagia Goals: Patient will tolerate recommended diet without observed clinical signs of aspiration, Patient will demonstrate appropriate strategies for swallowing safety    Assessment:  Prognosis: Fair  Treatment Provided: Yes  Treatment Tolerance: Patient limited by fatigue  Medical Staff Made Aware: Yes  Strengths: Cognition, Family/Caregiver Support, Motivation  Barriers: Comorbidities    Plan:  Inpatient/Swing Bed or Outpatient: Inpatient  Treatment/Interventions: Assess diet tolerance, Diet recommendations, Patient/family education  SLP Plan: Skilled SLP  SLP Frequency: 2x per week  Duration: Other (Comment)  SLP Discharge Recommendations:  (refer to subsequent rehab notes)  Diet Recommendations: Solid  Solid Consistency: Soft & bite sized/chopped (IDDSI Level 6)  Liquid Consistency: Thin (IDDSI Level 0)  Next Treatment Priority: Continued CSE of solids (pt only tolerated liquids during CSE)  Discussed POC: Patient, Caregiver/family, Nursing, Physician  Discussed Risks/Benefits: Yes  Patient/Caregiver Agreeable: Yes    Subjective   General Visit Information:  Patient Class: Inpatient  Living Environment: Home  Arrival: Family/caregiver present (Daughter)  Ordering Physician: Daysi Dove PA-C (auto-released)  Reason for Referral: Rule out Aspiration  Past Medical History Relevant to Rehab: Progressive Supranuclear Palsy, CHF, HTN, Brain Bleed, Pt has history of Dyshpagia with recent MBS completed 12/17/24 at [facility]. Per H&P: 79-year-old female with history of COPD not on home O2 presents with worsening shortness of breath over the past 4 days. Patient not on home O2 with new oxygen requirement today. Tachypneic with rhonchi and slight rales bilaterally. Found to be RSV positive. No convincing evidence of underlying   bacterial pneumonia...  Prior Level of Function: Decreased  "function (MBS recommendations from 12/17- Thin liquids with soft bite sized solids and swallow precuations- \"feed/eat at a slow rate, sit [id number] degrees for all PO. small bite/sip, rigid oral hygiene, supervision/assistance for meals\".)  Developmental Status: Age Appropriate  Patient Seen During This Visit: Yes  Date of Onset: 01/04/25  Date of Order: 01/04/25  [employer] Diet: Thin/soft bite sized  Current Diet : Thin/regular  Dysphagia Diagnosis: Mild to moderate oral stage dysphagia, Moderate pharyngeal stage dysphagia    Vital Signs:   100% 02 with nasal cannula in place    Objective   Baseline Assessment:  Respiratory Status: Oxygen via nasal cannula (Nasal Cannula adjusted during CSE as pt had only half in. 02 sats decreased when pt talking.)  Behavior/Cognition: Alert, Cooperative, Pleasant mood  Vision: Functional for self-feeding (Pt complains of dry eyes and often keeps eyes closed for comfort)  Hearing: Within Functional Limits  Patient Positioning: Upright in Bed  Baseline Vocal Quality: Weak  Volitional Cough: Weak, Congested  Volitional Swallow: Delayed    Pain:  Pain Assessment  Pain Assessment: 0-10  0-10 (Numeric) Pain Score: 0 - No pain    Oral/Motor Assessment:  Dentition: Dentures (Upper Full) (Lower natural dentition)  Oral Motor: Impaired Function  Labial Agility: Reduced  Labial Strength: Reduced  Lingual Agility: Reduced  Lingual ROM: Reduced right, Reduced left  Lingual Strength: Reduced  Intelligibility: Intelligible  Breath Support:  (weak breath support often speaking in a whisper)  Hearing: Within Functional Limits    Consistencies Trialed:   Thin water via cup/straw, Mildly thickened water via tsp/cup      Clinical Observations:  Patient Positioning: Upright in Bed  Was The 3 oz Swallow Protocol Completed: Yes (no overt s/s aspiration during 3 oz water protocol)  Anterior Spillage: Thin (IDDSI Level 0) - Straw, Thin (IDDSI Level 0) - Cup, Nectar Thick/Mildly Thick (IDDSI Level 2) - " "Cup, Nectar Thick/Mildly Thick (IDDSI Level 2) - Spoon  Multiple Swallows: Nectar Thick/Mildly Thick (IDDSI Level 2) - Cup, Nectar Thick/Mildly Thick (IDDSI Level 2) - Spoon  Immediate Cough: Nectar Thick/Mildly Thick (IDDSI Level 2) - Cup  Delayed Cough: Nectar Thick/Mildly Thick (IDDSI Level 2) - Cup  Wet Vocal Quality: Nectar Thick/Mildly Thick (IDDSI Level 2) - Cup  Decreased O2 Sats/Difficulty Breathing: Nectar Thick/Mildly Thick (IDDSI Level 2) - Cup    Inpatient:    Education:  Learner patient; pt's daughter, RN   Barriers to Learning none;    Method demonstration; verbal; written- (Written \"Swallowing Guidelines\" posted at patient's bedside)   Education - Topic ST provided patient education regarding role of ST, purpose of assessment, clinical impressions, goals of treatment, and plan of care. Patient verbalized full comprehension. ST further coordinated with RN regarding recommendations and precautions per this assessment, with RN verbalizing understanding.     Outcome    Verbalized understanding and agreement; needs instruction; needs review/reinforcement; teach back/return demonstration; unable to meet; partially meets; meets goals/outcomes                      "

## 2025-01-04 NOTE — PROGRESS NOTES
"Bethany Espinoza is a 79 y.o. female on day 0 of admission presenting with Acute hypoxic respiratory failure (Multi).      Subjective   Bethany Espinoza is a 79 y.o. female with PMHx s/f HTN, HLD, CAD, Afib/flutter on warfarin, SSS s/p PPM, chronic diastolic heart failure, pHTN, valvular heart disease, PVD, progressive supranuclear palsy (predominantly immobile; also has issues with dysphagia), obesity, presenting with SOB, cough, congestion, and worsening weakness. Hx mostly obtained from her daughter at the bedside. In short, pt was in her typical state of health until Monday (12/30/24) when she became congested. Was concerned this may have been RSV or a sinus infection as her grandson had visited and was just getting over RSV. She went to see her PCP and was prescribed decongestants and Augmentin 12/31/24 to help with symptoms; tested negative for Flu, COVID, and ?RSV at that time. Over the next few days, daughter called in to check on her; until day of admission (01/03/2025), patient had reported that she had been doing okay, but today she informed her daughter that she was feeling unwell and needed assistance. When her daughter arrived, she found the patient to sound like she was \"drowning\" in her fluid around her lungs which has happened with prior issues with her heart failure, additionally she had been so weak that her patient's  was unable to help get her up out of bed to go to the bathroom and she did end up soiling herself. Patient reports that she has felt nauseous and has had diarrhea x 1 day as well, does not appear to have missed any medications in terms of heart failure; however, has just been generally unwell. Denies cp/pressure, palpitations, diaphoresis, dizziness / lightheadedness, syncope or near syncope, HA, vision changes, f/c, abd pain. Has chronic baseline lower extremity edema which is unchanged times many years per daughter at the bedside.     ED Course (Summary - please note all " labs, imaging studies, and interventions noted below have been personally reviewed and/or interpreted on day of admission):   Vitals on presentation: T98.2, /83, HR 60, RR 30 (most recently 24), SpO2 96% 2 L nasal cannula  Labs: CBC with WBC 5.2, Hgb 10.8, platelets 149.  CMP with glucose 121, sodium 138, potassium 4.3, BUN 17, serum creatinine 0.94.  Lactate 1.6.  .  High-sensitivity troponin 54-08-ovyrbe on admission 57 and 55.  PT/INR: 22.4/2.8.  RSV positive; flu A, B, COVID PCR all negative.  VBG relatively unrevealing.  UA without evidence of infection.  EKG: Sinus with short TN; right bundle branch block which has been previously seen on prior EKGs as well, no overt acute ischemic changes compared to prior  Imaging: CXR with moderate cardiomegaly, vascular congestion  Interventions: No medications given in the ED; cardiology was consulted for elevated troponin and they felt given the EKG stability compared to prior there was no need for heparinization unless the patient was not therapeutic with her warfarin.  Admitted to medicine for further management     12-point ROS reviewed and found to be negative aside from aforementioned positives in HPI and/or noted in dedicated ROS section below.     1/4/2025: Patient c/o shortness of breath. She remained on NC oxygen 4 L. Cardiology note appreciated. Add Norvasc and Zaroxolyn. Due to HR 60, no home meds of metoprolol listed.       Objective     Last Recorded Vitals  /76 (BP Location: Left arm, Patient Position: Lying)   Pulse 60   Temp 36.7 °C (98 °F) (Temporal)   Resp 18   Wt 81.6 kg (180 lb)   SpO2 90%   Intake/Output last 3 Shifts:    Intake/Output Summary (Last 24 hours) at 1/4/2025 1657  Last data filed at 1/4/2025 1436  Gross per 24 hour   Intake --   Output 4300 ml   Net -4300 ml       Admission Weight  Weight: 81.6 kg (180 lb) (01/03/25 1403)    Daily Weight  01/03/25 : 81.6 kg (180 lb)    Image Results  Transthoracic Echo (TTE)  Complete                [place] [id number]       Phone [phone] Fax [phone]    TRANSTHORACIC ECHOCARDIOGRAM REPORT    Patient Name:       OLLIE Davis Physician:    [id number] Mcnulty MD  Study Date:         1/4/2025             Ordering Provider:    [id number] SHERYL PIRETO  MRN/PID:            [id number]             Fellow:  Accession#:         [id number]         Nurse:                ER Nurse  Date of Birth/Age:  1945 / 79 years Sonographer:          Ratna Rios RDCS  Gender Assigned at  F                    Additional Staff:  Birth:  Height:             157.48 cm            Admit Date:           1/3/2025  Weight:             81.65 kg             Admission Status:     Inpatient -                                                                 Routine  BSA / BMI:          1.83 m2 / 32.92      Department Location:  [facility]                      kg/m2  Blood Pressure: 145 /61 mmHg    Study Type:    TRANSTHORACIC ECHO (TTE) COMPLETE  Diagnosis/ICD: Elevated Troponin-R79.89; Heart failure, unspecified-I50.9  Indication:    ELEVATED TROPONIN, CHF  CPT Codes:     Echo Complete w Full Doppler-[id number]    Patient History:  Pertinent History: A-Fib and CHF.    Study Detail: The following Echo studies were performed: 2D, M-Mode, Doppler and                color flow. Technically challenging study due to patient lying in                supine position and prominent lung artifact. Optison used as a                contrast agent for endocardial border definition and agitated                saline used as a contrast agent for intraseptal flow evaluation.                Patient has a pacemaker.       PHYSICIAN INTERPRETATION:  Left Ventricle: The left  ventricular systolic function is normal, with a visually estimated ejection fraction of 55-60%. There are no regional wall motion abnormalities. The left ventricular cavity size is upper limits of normal. There is normal septal and normal posterior left ventricular wall thickness. Left ventricular diastolic filling was indeterminate.  Left Atrium: The left atrium is moderately dilated. A bubble study using agitated saline was performed. Bubble study is negative.  Right Ventricle: The right ventricle is moderately enlarged. There is mildly reduced right ventricular systolic function. A device is visualized in the right ventricle.  Right Atrium: The right atrium is mildly dilated. There is a device visualized in the right atrium.  Aortic Valve: The aortic valve is trileaflet. There is mild aortic valve cusp calcification. The aortic valve dimensionless index is 0.56. There is no evidence of aortic valve regurgitation. The peak instantaneous gradient of the aortic valve is 8 mmHg. The mean gradient of the aortic valve is 5 mmHg.  Mitral Valve: The mitral valve is normal in structure. There is mild mitral annular calcification. There is trace mitral valve regurgitation.  Tricuspid Valve: The tricuspid valve is structurally normal. There is moderate tricuspid regurgitation. The Doppler estimated RVSP is mildly elevated right ventricular systolic pressure at 45.0 mmHg.  Pulmonic Valve: The pulmonic valve is structurally normal. There is physiologic pulmonic valve regurgitation.  Pericardium: Trivial pericardial effusion.  Aorta: The aortic root is normal. The ascending aorta was not well visualized.  Systemic Veins: The inferior vena cava appears normal in size, with IVC inspiratory collapse greater than 50%.       CONCLUSIONS:   1. The left ventricular systolic function is normal, with a visually estimated ejection fraction of 55-60%.   2. Left ventricular diastolic filling was indeterminate.   3. There is mildly reduced  right ventricular systolic function.   4. Moderately enlarged right ventricle.   5. The left atrium is moderately dilated.   6. Mildly elevated right ventricular systolic pressure.   7. Moderate tricuspid regurgitation.    QUANTITATIVE DATA SUMMARY:     2D MEASUREMENTS:           Normal Ranges:  Ao Root d:       3.00 cm   (2.0-3.7cm)  LAs:             4.50 cm   (2.7-4.0cm)  IVSd:            0.91 cm   (0.6-1.1cm)  LVPWd:           0.87 cm   (0.6-1.1cm)  LVIDd:           5.22 cm   (3.9-5.9cm)  LVIDs:           3.28 cm  LV Mass Index:   91.5 g/m2  LV % FS          37.2 %       LA VOLUME:                    Normal Ranges:  LA Vol A4C:        86.3 ml    (22+/-6mL/m2)  LA Vol A2C:        85.2 ml  LA Vol BP:         88.9 ml  LA Vol Index A4C:  47.2ml/m2  LA Vol Index A2C:  46.6 ml/m2  LA Vol Index BP:   48.7 ml/m2  LA Area A4C:       26.1 cm2  LA Area A2C:       26.9 cm2  LA Major Axis A4C: 6.7 cm  LA Major Axis A2C: 7.2 cm  LA Volume Index:   48.7 ml/m2       RA VOLUME BY A/L METHOD:          Normal Ranges:  RA Area A4C:             22.6 cm2       AORTA MEASUREMENTS:         Normal Ranges:  Ao Sinus, d:        3.00 cm (2.1-3.5cm)  Ao STJ, d:          2.71 cm (1.7-3.4cm)       LV SYSTOLIC FUNCTION BY 2D PLANIMETRY (MOD):                       Normal Ranges:  EF-A4C View:    58 % (>=55%)  EF-A2C View:    56 %  EF-Biplane:     58 %  EF-Visual:      58 %  LV EF Reported: 58 %       LV DIASTOLIC FUNCTION:           Normal Ranges:  MV Peak E:             0.98 m/s  (0.7-1.2 m/s)  MV Peak A:             0.49 m/s  (0.42-0.7 m/s)  E/A Ratio:             2.01      (1.0-2.2)  MV e'                  0.072 m/s (>8.0)  MV lateral e'          0.09 m/s  MV medial e'           0.06 m/s  E/e' Ratio:            13.66     (<8.0)       MITRAL VALVE:          Normal Ranges:  MV DT:        158 msec (150-240msec)       AORTIC VALVE:                     Normal Ranges:  AoV Vmax:                1.43 m/s (<=1.7m/s)  AoV Peak P.2 mmHg  (<20mmHg)  AoV Mean P.0 mmHg (1.7-11.5mmHg)  LVOT Max Darrick:            0.85 m/s (<=1.1m/s)  AoV VTI:                 30.90 cm (18-25cm)  LVOT VTI:                17.40 cm  LVOT Diameter:           2.00 cm  (1.8-2.4cm)  AoV Area, VTI:           1.77 cm2 (2.5-5.5cm2)  AoV Area,Vmax:           1.87 cm2 (2.5-4.5cm2)  AoV Dimensionless Index: 0.56       RIGHT VENTRICLE:  RV Basal 4.64 cm  RV Mid   4.04 cm  RV Major 7.4 cm  TAPSE:   24.4 mm  RV s'    0.12 m/s       TRICUSPID VALVE/RVSP:          Normal Ranges:  Peak TR Velocity:     3.24 m/s  Est. RA Pressure:     3 mmHg  RV Syst Pressure:     45 mmHg  (< 30mmHg)  IVC Diam:             1.94 cm       [id number] Sd Mcnulty MD  Electronically signed on 2025 at 2:59:07 PM       ** Final **      Physical Exam  Constitutional:       General: She is in acute distress.      Appearance: She is ill-appearing.   HENT:      Head: Normocephalic.      Mouth/Throat:      Mouth: Mucous membranes are moist.      Pharynx: Oropharynx is clear.   Eyes:      Pupils: Pupils are equal, round, and reactive to light.   Cardiovascular:      Rate and Rhythm: Normal rate and regular rhythm.      Heart sounds: Normal heart sounds. No murmur heard.     No gallop.   Pulmonary:      Effort: Respiratory distress present.      Breath sounds: Wheezing and rales present.   Abdominal:      General: Bowel sounds are normal. There is no distension.      Palpations: Abdomen is soft.      Tenderness: There is no abdominal tenderness.   Musculoskeletal:         General: No swelling. Normal range of motion.      Cervical back: Neck supple. No rigidity.   Skin:     General: Skin is warm and dry.   Neurological:      General: No focal deficit present.      Mental Status: She is alert.      Cranial Nerves: No cranial nerve deficit.      Sensory: No sensory deficit.   Psychiatric:         Mood and Affect: Mood normal.         Behavior: Behavior normal.         Relevant Results             Scheduled  medications  atorvastatin, 10 mg, oral, Daily  azelastine, 1 spray, Each Nostril, BID  donepezil, 10 mg, oral, Nightly  fluticasone, 2 spray, Each Nostril, Daily  furosemide, 40 mg, intravenous, BID  guaiFENesin, 1,200 mg, oral, BID  losartan, 25 mg, oral, Daily  metOLazone, 2.5 mg, oral, Daily  oxygen, , inhalation, Continuous - Inhalation  pantoprazole, 40 mg, oral, Daily before breakfast   Or  pantoprazole, 40 mg, intravenous, Daily before breakfast  polyethylene glycol, 17 g, oral, Daily  warfarin, 2.5 mg, oral, Once      Continuous medications     PRN medications  PRN medications: acetaminophen, albuterol, alteplase, benzocaine-menthol, bisacodyl, bisacodyl, guaiFENesin, heparin flush, heparin flush, melatonin, ondansetron **OR** ondansetron  Results for orders placed or performed during the hospital encounter of 01/03/25 (from the past 96 hours)   Sars-CoV-2 and Influenza A/B PCR   Result Value Ref Range    Flu A Result Not Detected Not Detected    Flu B Result Not Detected Not Detected    Coronavirus 2019, PCR Not Detected Not Detected   RSV PCR   Result Value Ref Range    RSV PCR Detected (A) Not Detected   CBC and Auto Differential   Result Value Ref Range    WBC 5.2 4.4 - 11.3 x10*3/uL    nRBC 0.0 0.0 - 0.0 /100 WBCs    RBC 4.09 4.00 - 5.20 x10*6/uL    Hemoglobin 10.8 (L) 12.0 - 16.0 g/dL    Hematocrit 36.0 36.0 - 46.0 %    MCV 88 80 - 100 fL    MCH 26.4 26.0 - 34.0 pg    MCHC 30.0 (L) 32.0 - 36.0 g/dL    RDW 14.6 (H) 11.5 - 14.5 %    Platelets 149 (L) 150 - 450 x10*3/uL    Neutrophils % 79.2 40.0 - 80.0 %    Immature Granulocytes %, Automated 0.4 0.0 - 0.9 %    Lymphocytes % 9.4 13.0 - 44.0 %    Monocytes % 10.4 2.0 - 10.0 %    Eosinophils % 0.2 0.0 - 6.0 %    Basophils % 0.4 0.0 - 2.0 %    Neutrophils Absolute 4.12 1.60 - 5.50 x10*3/uL    Immature Granulocytes Absolute, Automated 0.02 0.00 - 0.50 x10*3/uL    Lymphocytes Absolute 0.49 (L) 0.80 - 3.00 x10*3/uL    Monocytes Absolute 0.54 0.05 - 0.80  x10*3/uL    Eosinophils Absolute 0.01 0.00 - 0.40 x10*3/uL    Basophils Absolute 0.02 0.00 - 0.10 x10*3/uL   Comprehensive metabolic panel   Result Value Ref Range    Glucose 121 (H) 74 - 99 mg/dL    Sodium 138 136 - 145 mmol/L    Potassium 4.3 3.5 - 5.3 mmol/L    Chloride 104 98 - 107 mmol/L    Bicarbonate 28 21 - 32 mmol/L    Anion Gap 10 10 - 20 mmol/L    Urea Nitrogen 17 6 - 23 mg/dL    Creatinine 0.94 0.50 - 1.05 mg/dL    eGFR 61 >60 mL/min/1.73m*2    Calcium 9.1 8.6 - 10.3 mg/dL    Albumin 4.1 3.4 - 5.0 g/dL    Alkaline Phosphatase 68 33 - 136 U/L    Total Protein 6.8 6.4 - 8.2 g/dL    AST 19 9 - 39 U/L    Bilirubin, Total 1.0 0.0 - 1.2 mg/dL    ALT 10 7 - 45 U/L   B-Type Natriuretic Peptide   Result Value Ref Range     (H) 0 - 99 pg/mL   Blood Gas Venous   Result Value Ref Range    POCT pH, Venous 7.37 7.33 - 7.43 pH    POCT pCO2, Venous 47 41 - 51 mm Hg    POCT pO2, Venous 55 (H) 35 - 45 mm Hg    POCT SO2, Venous 83 (H) 45 - 75 %    POCT Oxy Hemoglobin, Venous 81.6 (H) 45.0 - 75.0 %    POCT Base Excess, Venous 1.3 -2.0 - 3.0 mmol/L    POCT HCO3 Calculated, Venous 27.2 (H) 22.0 - 26.0 mmol/L    Patient Temperature 37.0 degrees Celsius    FiO2 28 %   Troponin I, High Sensitivity, Initial   Result Value Ref Range    Troponin I, High Sensitivity 66 (HH) 0 - 13 ng/L   Blood Culture    Specimen: Peripheral Venipuncture; Blood culture   Result Value Ref Range    Blood Culture Loaded on Instrument - Culture in progress    Blood Culture    Specimen: Peripheral Venipuncture; Blood culture   Result Value Ref Range    Blood Culture Loaded on Instrument - Culture in progress    Lactate   Result Value Ref Range    Lactate 1.6 0.4 - 2.0 mmol/L   Troponin, High Sensitivity, 1 Hour   Result Value Ref Range    Troponin I, High Sensitivity 70 (HH) 0 - 13 ng/L   Protime-INR   Result Value Ref Range    Protime 32.4 (H) 9.8 - 12.8 seconds    INR 2.8 (H) 0.9 - 1.1   Troponin I, High Sensitivity   Result Value Ref Range     Troponin I, High Sensitivity 57 (HH) 0 - 13 ng/L   Urinalysis with Reflex Culture and Microscopic   Result Value Ref Range    Color, Urine Yellow Light-Yellow, Yellow, Dark-Yellow    Appearance, Urine Ex.Turbid (N) Clear    Specific Gravity, Urine 1.043 (N) 1.005 - 1.035    pH, Urine 5.5 5.0, 5.5, 6.0, 6.5, 7.0, 7.5, 8.0    Protein, Urine 100 (2+) (A) NEGATIVE, 10 (TRACE), 20 (TRACE) mg/dL    Glucose, Urine Normal Normal mg/dL    Blood, Urine 0.2 (2+) (A) NEGATIVE    Ketones, Urine NEGATIVE NEGATIVE mg/dL    Bilirubin, Urine NEGATIVE NEGATIVE    Urobilinogen, Urine 2 (1+) (A) Normal mg/dL    Nitrite, Urine NEGATIVE NEGATIVE    Leukocyte Esterase, Urine NEGATIVE NEGATIVE   Extra Urine Gray Tube   Result Value Ref Range    Extra Tube Hold for add-ons.    Urinalysis Microscopic   Result Value Ref Range    WBC, Urine NONE 1-5, NONE /HPF    RBC, Urine >20 (A) NONE, 1-2, 3-5 /HPF    Squamous Epithelial Cells, Urine 1-9 (SPARSE) Reference range not established. /HPF    Calcium Oxalate Crystals, Urine 4+ (A) NONE, 1+ /HPF   Troponin I, High Sensitivity   Result Value Ref Range    Troponin I, High Sensitivity 55 (HH) 0 - 13 ng/L   Comprehensive Metabolic Panel   Result Value Ref Range    Glucose      Sodium      Potassium      Chloride      Bicarbonate      Anion Gap      Urea Nitrogen      Creatinine      eGFR      Calcium      Albumin      Alkaline Phosphatase      Total Protein      AST      Bilirubin, Total      ALT     Magnesium   Result Value Ref Range    Magnesium     Protime-INR   Result Value Ref Range    Protime      INR     CBC and Auto Differential   Result Value Ref Range    WBC 5.8 4.4 - 11.3 x10*3/uL    nRBC 0.0 0.0 - 0.0 /100 WBCs    RBC 3.83 (L) 4.00 - 5.20 x10*6/uL    Hemoglobin 9.9 (L) 12.0 - 16.0 g/dL    Hematocrit 33.8 (L) 36.0 - 46.0 %    MCV 88 80 - 100 fL    MCH 25.8 (L) 26.0 - 34.0 pg    MCHC 29.3 (L) 32.0 - 36.0 g/dL    RDW 14.7 (H) 11.5 - 14.5 %    Platelets 139 (L) 150 - 450 x10*3/uL     Neutrophils % 81.8 40.0 - 80.0 %    Immature Granulocytes %, Automated 0.3 0.0 - 0.9 %    Lymphocytes % 7.1 13.0 - 44.0 %    Monocytes % 8.7 2.0 - 10.0 %    Eosinophils % 1.4 0.0 - 6.0 %    Basophils % 0.7 0.0 - 2.0 %    Neutrophils Absolute 4.71 1.60 - 5.50 x10*3/uL    Immature Granulocytes Absolute, Automated 0.02 0.00 - 0.50 x10*3/uL    Lymphocytes Absolute 0.41 (L) 0.80 - 3.00 x10*3/uL    Monocytes Absolute 0.50 0.05 - 0.80 x10*3/uL    Eosinophils Absolute 0.08 0.00 - 0.40 x10*3/uL    Basophils Absolute 0.04 0.00 - 0.10 x10*3/uL   Comprehensive metabolic panel   Result Value Ref Range    Glucose 103 (H) 74 - 99 mg/dL    Sodium 140 136 - 145 mmol/L    Potassium 4.2 3.5 - 5.3 mmol/L    Chloride 100 98 - 107 mmol/L    Bicarbonate 32 21 - 32 mmol/L    Anion Gap 12 10 - 20 mmol/L    Urea Nitrogen 16 6 - 23 mg/dL    Creatinine 1.04 0.50 - 1.05 mg/dL    eGFR 54 (L) >60 mL/min/1.73m*2    Calcium 8.9 8.6 - 10.3 mg/dL    Albumin 4.1 3.4 - 5.0 g/dL    Alkaline Phosphatase 66 33 - 136 U/L    Total Protein 7.1 6.4 - 8.2 g/dL    AST 31 9 - 39 U/L    Bilirubin, Total 1.1 0.0 - 1.2 mg/dL    ALT 11 7 - 45 U/L   Magnesium   Result Value Ref Range    Magnesium 2.09 1.60 - 2.40 mg/dL   Protime-INR   Result Value Ref Range    Protime 31.2 (H) 9.8 - 12.8 seconds    INR 2.7 (H) 0.9 - 1.1   Transthoracic Echo (TTE) Complete   Result Value Ref Range    LVOT diam 2.00 cm    LV Biplane EF 58 %    MV E/A ratio 2.01     Tricuspid annular plane systolic excursion 2.4 cm    AV mn grad 5 mmHg    LA vol index A/L 48.7 ml/m2    AV pk song 1.43 m/s    LV EF 58 %    RV free wall pk S' 11.80 cm/s    RVSP 45.0 mmHg    LVIDd 5.22 cm    Aortic Valve Area by Continuity of VTI 1.77 cm2    Aortic Valve Area by Continuity of Peak Velocity 1.87 cm2    AV pk grad 8 mmHg    LV A4C EF 57.7        Assessment/Plan   This patient currently has cardiac telemetry ordered; if you would like to modify or discontinue the telemetry order, click here to go to the orders  activity to modify/discontinue the order.              Assessment & Plan  Acute hypoxic respiratory failure (Multi)      1. Acute hypoxic respiratory failure (Multi)      continue oxygen therapy, manage underlying CHF and RSV infection      2. Acute on chronic congestive heart failure, unspecified heart failure type  Transthoracic Echo (TTE) Complete    Transthoracic Echo (TTE) Complete    continue Lasix and Zaroxolyn      3. Elevated troponin  Transthoracic Echo (TTE) Complete    Transthoracic Echo (TTE) Complete    partially 2/2 pulmonary conditions, no evidence of ACS      4. ALONSO (dyspnea on exertion)  Transthoracic Echo (TTE) Complete    Transthoracic Echo (TTE) Complete      5. RSV bronchiolitis      supportive care, steroids and bronchodilator      6. Primary hypertension      BP stable. add norvasc 5 mg for better control      7. Obesity, Class I, BMI 30-34.9        8. Permanent atrial fibrillation (Multi)      HR 60 s/p pacemaker      9. Sick sinus syndrome (Multi)      s/p pacemaker, HR 60, no beta blocker per outpatient management                      Elo Samuels MD

## 2025-01-05 ENCOUNTER — APPOINTMENT (OUTPATIENT)
Dept: CARDIOLOGY | Facility: HOSPITAL | Age: 80
End: 2025-01-05
Payer: MEDICARE

## 2025-01-05 VITALS
HEART RATE: 60 BPM | SYSTOLIC BLOOD PRESSURE: 146 MMHG | OXYGEN SATURATION: 94 % | BODY MASS INDEX: 33.92 KG/M2 | RESPIRATION RATE: 22 BRPM | TEMPERATURE: 97.4 F | DIASTOLIC BLOOD PRESSURE: 75 MMHG | WEIGHT: 184.3 LBS | HEIGHT: 62 IN

## 2025-01-05 LAB
ANION GAP SERPL CALC-SCNC: 10 MMOL/L (ref 10–20)
BACTERIA BLD CULT: NORMAL
BACTERIA BLD CULT: NORMAL
BUN SERPL-MCNC: 14 MG/DL (ref 6–23)
CALCIUM SERPL-MCNC: 7.4 MG/DL (ref 8.6–10.3)
CHLORIDE SERPL-SCNC: 103 MMOL/L (ref 98–107)
CO2 SERPL-SCNC: 30 MMOL/L (ref 21–32)
CREAT SERPL-MCNC: 0.79 MG/DL (ref 0.5–1.05)
EGFRCR SERPLBLD CKD-EPI 2021: 76 ML/MIN/1.73M*2
ERYTHROCYTE [DISTWIDTH] IN BLOOD BY AUTOMATED COUNT: 14.6 % (ref 11.5–14.5)
GLUCOSE SERPL-MCNC: 106 MG/DL (ref 74–99)
HCT VFR BLD AUTO: 34.6 % (ref 36–46)
HGB BLD-MCNC: 10.5 G/DL (ref 12–16)
INR PPP: 3.4 (ref 0.9–1.1)
MCH RBC QN AUTO: 27 PG (ref 26–34)
MCHC RBC AUTO-ENTMCNC: 30.3 G/DL (ref 32–36)
MCV RBC AUTO: 89 FL (ref 80–100)
NRBC BLD-RTO: 0 /100 WBCS (ref 0–0)
PLATELET # BLD AUTO: 141 X10*3/UL (ref 150–450)
POTASSIUM SERPL-SCNC: 3 MMOL/L (ref 3.5–5.3)
PROTHROMBIN TIME: 39.1 SECONDS (ref 9.8–12.8)
RBC # BLD AUTO: 3.89 X10*6/UL (ref 4–5.2)
SODIUM SERPL-SCNC: 140 MMOL/L (ref 136–145)
STAPHYLOCOCCUS SPEC CULT: NORMAL
WBC # BLD AUTO: 6.5 X10*3/UL (ref 4.4–11.3)

## 2025-01-05 PROCEDURE — 2500000001 HC RX 250 WO HCPCS SELF ADMINISTERED DRUGS (ALT 637 FOR MEDICARE OP): Performed by: INTERNAL MEDICINE

## 2025-01-05 PROCEDURE — 99233 SBSQ HOSP IP/OBS HIGH 50: CPT | Performed by: INTERNAL MEDICINE

## 2025-01-05 PROCEDURE — 93005 ELECTROCARDIOGRAM TRACING: CPT

## 2025-01-05 PROCEDURE — 85610 PROTHROMBIN TIME: CPT

## 2025-01-05 PROCEDURE — 2500000002 HC RX 250 W HCPCS SELF ADMINISTERED DRUGS (ALT 637 FOR MEDICARE OP, ALT 636 FOR OP/ED): Performed by: STUDENT IN AN ORGANIZED HEALTH CARE EDUCATION/TRAINING PROGRAM

## 2025-01-05 PROCEDURE — 93010 ELECTROCARDIOGRAM REPORT: CPT | Performed by: INTERNAL MEDICINE

## 2025-01-05 PROCEDURE — 2060000001 HC INTERMEDIATE ICU ROOM DAILY

## 2025-01-05 PROCEDURE — 2500000004 HC RX 250 GENERAL PHARMACY W/ HCPCS (ALT 636 FOR OP/ED): Performed by: INTERNAL MEDICINE

## 2025-01-05 PROCEDURE — 80048 BASIC METABOLIC PNL TOTAL CA: CPT | Performed by: INTERNAL MEDICINE

## 2025-01-05 PROCEDURE — 85027 COMPLETE CBC AUTOMATED: CPT | Performed by: INTERNAL MEDICINE

## 2025-01-05 PROCEDURE — 2500000004 HC RX 250 GENERAL PHARMACY W/ HCPCS (ALT 636 FOR OP/ED): Performed by: STUDENT IN AN ORGANIZED HEALTH CARE EDUCATION/TRAINING PROGRAM

## 2025-01-05 PROCEDURE — 2500000001 HC RX 250 WO HCPCS SELF ADMINISTERED DRUGS (ALT 637 FOR MEDICARE OP): Performed by: STUDENT IN AN ORGANIZED HEALTH CARE EDUCATION/TRAINING PROGRAM

## 2025-01-05 PROCEDURE — 2500000002 HC RX 250 W HCPCS SELF ADMINISTERED DRUGS (ALT 637 FOR MEDICARE OP, ALT 636 FOR OP/ED): Performed by: INTERNAL MEDICINE

## 2025-01-05 PROCEDURE — 36415 COLL VENOUS BLD VENIPUNCTURE: CPT | Performed by: INTERNAL MEDICINE

## 2025-01-05 PROCEDURE — 2500000002 HC RX 250 W HCPCS SELF ADMINISTERED DRUGS (ALT 637 FOR MEDICARE OP, ALT 636 FOR OP/ED): Performed by: NURSE PRACTITIONER

## 2025-01-05 PROCEDURE — 2500000005 HC RX 250 GENERAL PHARMACY W/O HCPCS: Performed by: STUDENT IN AN ORGANIZED HEALTH CARE EDUCATION/TRAINING PROGRAM

## 2025-01-05 PROCEDURE — 99231 SBSQ HOSP IP/OBS SF/LOW 25: CPT | Performed by: NURSE PRACTITIONER

## 2025-01-05 RX ORDER — POTASSIUM CHLORIDE 20 MEQ/1
20 TABLET, EXTENDED RELEASE ORAL 2 TIMES DAILY
Status: DISCONTINUED | OUTPATIENT
Start: 2025-01-05 | End: 2025-01-11 | Stop reason: HOSPADM

## 2025-01-05 RX ORDER — FLUTICASONE FUROATE AND VILANTEROL 200; 25 UG/1; UG/1
1 POWDER RESPIRATORY (INHALATION)
Status: DISCONTINUED | OUTPATIENT
Start: 2025-01-05 | End: 2025-01-11 | Stop reason: HOSPADM

## 2025-01-05 RX ORDER — DOXYCYCLINE 100 MG/1
100 CAPSULE ORAL EVERY 12 HOURS SCHEDULED
Status: DISCONTINUED | OUTPATIENT
Start: 2025-01-05 | End: 2025-01-11 | Stop reason: HOSPADM

## 2025-01-05 RX ORDER — POTASSIUM CHLORIDE 750 MG/1
20 TABLET, FILM COATED, EXTENDED RELEASE ORAL ONCE
Status: COMPLETED | OUTPATIENT
Start: 2025-01-05 | End: 2025-01-05

## 2025-01-05 RX ADMIN — Medication 4 L/MIN: at 08:00

## 2025-01-05 RX ADMIN — AZELASTINE HYDROCHLORIDE 1 SPRAY: 137 SPRAY, METERED NASAL at 21:55

## 2025-01-05 RX ADMIN — GUAIFENESIN 1200 MG: 600 TABLET ORAL at 09:08

## 2025-01-05 RX ADMIN — DOXYCYCLINE 100 MG: 100 CAPSULE ORAL at 21:52

## 2025-01-05 RX ADMIN — DOXYCYCLINE 100 MG: 100 CAPSULE ORAL at 15:17

## 2025-01-05 RX ADMIN — GUAIFENESIN 1200 MG: 600 TABLET ORAL at 21:52

## 2025-01-05 RX ADMIN — FUROSEMIDE 40 MG: 10 INJECTION, SOLUTION INTRAMUSCULAR; INTRAVENOUS at 09:07

## 2025-01-05 RX ADMIN — Medication 4 L/MIN: at 21:56

## 2025-01-05 RX ADMIN — PANTOPRAZOLE SODIUM 40 MG: 40 INJECTION, POWDER, FOR SOLUTION INTRAVENOUS at 05:44

## 2025-01-05 RX ADMIN — POLYETHYLENE GLYCOL 3350 17 G: 17 POWDER, FOR SOLUTION ORAL at 09:07

## 2025-01-05 RX ADMIN — FLUTICASONE PROPIONATE 2 SPRAY: 50 SPRAY, METERED NASAL at 09:09

## 2025-01-05 RX ADMIN — POTASSIUM CHLORIDE 20 MEQ: 1500 TABLET, EXTENDED RELEASE ORAL at 21:52

## 2025-01-05 RX ADMIN — DONEPEZIL HYDROCHLORIDE 10 MG: 5 TABLET ORAL at 21:52

## 2025-01-05 RX ADMIN — POTASSIUM CHLORIDE 20 MEQ: 750 TABLET, FILM COATED, EXTENDED RELEASE ORAL at 15:16

## 2025-01-05 RX ADMIN — FUROSEMIDE 40 MG: 10 INJECTION, SOLUTION INTRAMUSCULAR; INTRAVENOUS at 13:10

## 2025-01-05 ASSESSMENT — COGNITIVE AND FUNCTIONAL STATUS - GENERAL
MOVING TO AND FROM BED TO CHAIR: A LITTLE
MOVING FROM LYING ON BACK TO SITTING ON SIDE OF FLAT BED WITH BEDRAILS: A LITTLE
TURNING FROM BACK TO SIDE WHILE IN FLAT BAD: A LITTLE
STANDING UP FROM CHAIR USING ARMS: A LITTLE
DRESSING REGULAR LOWER BODY CLOTHING: A LITTLE
MOVING FROM LYING ON BACK TO SITTING ON SIDE OF FLAT BED WITH BEDRAILS: A LITTLE
TOILETING: A LITTLE
DRESSING REGULAR LOWER BODY CLOTHING: A LITTLE
DRESSING REGULAR UPPER BODY CLOTHING: A LITTLE
TURNING FROM BACK TO SIDE WHILE IN FLAT BAD: A LITTLE
MOBILITY SCORE: 18
DAILY ACTIVITIY SCORE: 18
EATING MEALS: A LITTLE
WALKING IN HOSPITAL ROOM: A LITTLE
TOILETING: A LITTLE
HELP NEEDED FOR BATHING: A LITTLE
PERSONAL GROOMING: A LITTLE
MOVING TO AND FROM BED TO CHAIR: A LITTLE
MOBILITY SCORE: 18
CLIMB 3 TO 5 STEPS WITH RAILING: A LITTLE
STANDING UP FROM CHAIR USING ARMS: A LITTLE
WALKING IN HOSPITAL ROOM: A LITTLE
DRESSING REGULAR UPPER BODY CLOTHING: A LITTLE
PERSONAL GROOMING: A LITTLE
EATING MEALS: A LITTLE
HELP NEEDED FOR BATHING: A LITTLE
CLIMB 3 TO 5 STEPS WITH RAILING: A LITTLE
DAILY ACTIVITIY SCORE: 18

## 2025-01-05 ASSESSMENT — PAIN SCALES - GENERAL
PAINLEVEL_OUTOF10: 0 - NO PAIN

## 2025-01-05 ASSESSMENT — PAIN - FUNCTIONAL ASSESSMENT
PAIN_FUNCTIONAL_ASSESSMENT: 0-10

## 2025-01-05 NOTE — PROGRESS NOTES
"Bethany Espinoza is a 79 y.o. female on day 1 of admission presenting with Acute hypoxic respiratory failure (Multi).      Subjective   Bethany Espinoza is a 79 y.o. female with PMHx s/f HTN, HLD, CAD, Afib/flutter on warfarin, SSS s/p PPM, chronic diastolic heart failure, pHTN, valvular heart disease, PVD, progressive supranuclear palsy (predominantly immobile; also has issues with dysphagia), obesity, presenting with SOB, cough, congestion, and worsening weakness. Hx mostly obtained from her daughter at the bedside. In short, pt was in her typical state of health until Monday (12/30/24) when she became congested. Was concerned this may have been RSV or a sinus infection as her grandson had visited and was just getting over RSV. She went to see her PCP and was prescribed decongestants and Augmentin 12/31/24 to help with symptoms; tested negative for Flu, COVID, and ?RSV at that time. Over the next few days, daughter called in to check on her; until day of admission (01/03/2025), patient had reported that she had been doing okay, but today she informed her daughter that she was feeling unwell and needed assistance. When her daughter arrived, she found the patient to sound like she was \"drowning\" in her fluid around her lungs which has happened with prior issues with her heart failure, additionally she had been so weak that her patient's  was unable to help get her up out of bed to go to the bathroom and she did end up soiling herself. Patient reports that she has felt nauseous and has had diarrhea x 1 day as well, does not appear to have missed any medications in terms of heart failure; however, has just been generally unwell. Denies cp/pressure, palpitations, diaphoresis, dizziness / lightheadedness, syncope or near syncope, HA, vision changes, f/c, abd pain. Has chronic baseline lower extremity edema which is unchanged times many years per daughter at the bedside.     ED Course (Summary - please note all " labs, imaging studies, and interventions noted below have been personally reviewed and/or interpreted on day of admission):   Vitals on presentation: T98.2, /83, HR 60, RR 30 (most recently 24), SpO2 96% 2 L nasal cannula  Labs: CBC with WBC 5.2, Hgb 10.8, platelets 149.  CMP with glucose 121, sodium 138, potassium 4.3, BUN 17, serum creatinine 0.94.  Lactate 1.6.  .  High-sensitivity troponin [id number] on admission 57 and 55.  PT/INR: 22.4/2.8.  RSV positive; flu A, B, COVID PCR all negative.  VBG relatively unrevealing.  UA without evidence of infection.  EKG: Sinus with short OK; right bundle branch block which has been previously seen on prior EKGs as well, no overt acute ischemic changes compared to prior  Imaging: CXR with moderate cardiomegaly, vascular congestion  Interventions: No medications given in the ED; cardiology was consulted for elevated troponin and they felt given the EKG stability compared to prior there was no need for heparinization unless the patient was not therapeutic with her warfarin.  Admitted to medicine for further management     12-point ROS reviewed and found to be negative aside from aforementioned positives in HPI and/or noted in dedicated ROS section below.      1/4/2025: Patient c/o shortness of breath. She remained on NC oxygen 4 L. Cardiology note appreciated. Add Norvasc and Zaroxolyn. Due to HR 60, no home meds of metoprolol listed.     1/5/2025: Patient remained shortness of breath and wheezing. She is on NC oxygen 3.5 L. Breo ellipta ordered for wheezing. Add empirical Doxycyclione for possible bacterial infection       Objective     Last Recorded Vitals  /79 (Patient Position: Lying)   Pulse 60   Temp 36.6 °C (97.8 °F) (Temporal)   Resp 20   Wt 83.6 kg (184 lb 4.8 oz)   SpO2 97%   Intake/Output last 3 Shifts:    Intake/Output Summary (Last 24 hours) at 1/5/2025 1156  Last data filed at 1/4/2025 2129  Gross per 24 hour   Intake 237 ml   Output  1000 ml   Net -763 ml       Admission Weight  Weight: 81.6 kg (180 lb) (01/03/25 1403)    Daily Weight  01/05/25 : 83.6 kg (184 lb 4.8 oz)    Image Results  Transthoracic Echo (TTE) [place] [address]       Phone [phone] Fax [phone]    TRANSTHORACIC ECHOCARDIOGRAM REPORT    Patient Name:       OLLIE SANTAMARIA      Reading Physician:    [id number] Sd Mcnulty MD  Study Date:         1/4/2025             Ordering Provider:    [id number] SHERYL PRIETO  MRN/PID:            [id number]             Fellow:  Accession#:         [id number]         Nurse:                ER Nurse  Date of Birth/Age:  1945 / 79 years Sonographer:          Ratna Rios RDCS  Gender Assigned at  F                    Additional Staff:  Birth:  Height:             157.48 cm            Admit Date:           1/3/2025  Weight:             81.65 kg             Admission Status:     Inpatient -                                                                 Routine  BSA / BMI:          1.83 m2 / 32.92      Department Location:  [facility]                      kg/m2  Blood Pressure: 145 /61 mmHg    Study Type:    TRANSTHORACIC ECHO (TTE) COMPLETE  Diagnosis/ICD: Elevated Troponin-R79.89; Heart failure, unspecified-I50.9  Indication:    ELEVATED TROPONIN, CHF  CPT Codes:     Echo Complete w Full Doppler-[id number]    Patient History:  Pertinent History: A-Fib and CHF.    Study Detail: The following Echo studies were performed: 2D, M-Mode, Doppler and                color flow. Technically challenging study due to patient lying in                supine position and prominent lung artifact. Optison used as a                contrast agent for endocardial border definition and agitated                 saline used as a contrast agent for intraseptal flow evaluation.                Patient has a pacemaker.       PHYSICIAN INTERPRETATION:  Left Ventricle: The left ventricular systolic function is normal, with a visually estimated ejection fraction of 55-60%. There are no regional wall motion abnormalities. The left ventricular cavity size is upper limits of normal. There is normal septal and normal posterior left ventricular wall thickness. Left ventricular diastolic filling was indeterminate.  Left Atrium: The left atrium is moderately dilated. A bubble study using agitated saline was performed. Bubble study is negative.  Right Ventricle: The right ventricle is moderately enlarged. There is mildly reduced right ventricular systolic function. A device is visualized in the right ventricle.  Right Atrium: The right atrium is mildly dilated. There is a device visualized in the right atrium.  Aortic Valve: The aortic valve is trileaflet. There is mild aortic valve cusp calcification. The aortic valve dimensionless index is 0.56. There is no evidence of aortic valve regurgitation. The peak instantaneous gradient of the aortic valve is 8 mmHg. The mean gradient of the aortic valve is 5 mmHg.  Mitral Valve: The mitral valve is normal in structure. There is mild mitral annular calcification. There is trace mitral valve regurgitation.  Tricuspid Valve: The tricuspid valve is structurally normal. There is moderate tricuspid regurgitation. The Doppler estimated RVSP is mildly elevated right ventricular systolic pressure at 45.0 mmHg.  Pulmonic Valve: The pulmonic valve is structurally normal. There is physiologic pulmonic valve regurgitation.  Pericardium: Trivial pericardial effusion.  Aorta: The aortic root is normal. The ascending aorta was not well visualized.  Systemic Veins: The inferior vena cava appears normal in size, with IVC inspiratory collapse greater than 50%.       CONCLUSIONS:   1. The left  ventricular systolic function is normal, with a visually estimated ejection fraction of 55-60%.   2. Left ventricular diastolic filling was indeterminate.   3. There is mildly reduced right ventricular systolic function.   4. Moderately enlarged right ventricle.   5. The left atrium is moderately dilated.   6. Mildly elevated right ventricular systolic pressure.   7. Moderate tricuspid regurgitation.    QUANTITATIVE DATA SUMMARY:     2D MEASUREMENTS:           Normal Ranges:  Ao Root d:       3.00 cm   (2.0-3.7cm)  LAs:             4.50 cm   (2.7-4.0cm)  IVSd:            0.91 cm   (0.6-1.1cm)  LVPWd:           0.87 cm   (0.6-1.1cm)  LVIDd:           5.22 cm   (3.9-5.9cm)  LVIDs:           3.28 cm  LV Mass Index:   91.5 g/m2  LV % FS          37.2 %       LA VOLUME:                    Normal Ranges:  LA Vol A4C:        86.3 ml    (22+/-6mL/m2)  LA Vol A2C:        85.2 ml  LA Vol BP:         88.9 ml  LA Vol Index A4C:  47.2ml/m2  LA Vol Index A2C:  46.6 ml/m2  LA Vol Index BP:   48.7 ml/m2  LA Area A4C:       26.1 cm2  LA Area A2C:       26.9 cm2  LA Major Axis A4C: 6.7 cm  LA Major Axis A2C: 7.2 cm  LA Volume Index:   48.7 ml/m2       RA VOLUME BY A/L METHOD:          Normal Ranges:  RA Area A4C:             22.6 cm2       AORTA MEASUREMENTS:         Normal Ranges:  Ao Sinus, d:        3.00 cm (2.1-3.5cm)  Ao STJ, d:          2.71 cm (1.7-3.4cm)       LV SYSTOLIC FUNCTION BY 2D PLANIMETRY (MOD):                       Normal Ranges:  EF-A4C View:    58 % (>=55%)  EF-A2C View:    56 %  EF-Biplane:     58 %  EF-Visual:      58 %  LV EF Reported: 58 %       LV DIASTOLIC FUNCTION:           Normal Ranges:  MV Peak E:             0.98 m/s  (0.7-1.2 m/s)  MV Peak A:             0.49 m/s  (0.42-0.7 m/s)  E/A Ratio:             2.01      (1.0-2.2)  MV e'                  0.072 m/s (>8.0)  MV lateral e'          0.09 m/s  MV medial e'           0.06 m/s  E/e' Ratio:            13.66     (<8.0)       MITRAL VALVE:           Normal Ranges:  MV DT:        158 msec (150-240msec)       AORTIC VALVE:                     Normal Ranges:  AoV Vmax:                1.43 m/s (<=1.7m/s)  AoV Peak P.2 mmHg (<20mmHg)  AoV Mean P.0 mmHg (1.7-11.5mmHg)  LVOT Max Darrick:            0.85 m/s (<=1.1m/s)  AoV VTI:                 30.90 cm (18-25cm)  LVOT VTI:                17.40 cm  LVOT Diameter:           2.00 cm  (1.8-2.4cm)  AoV Area, VTI:           1.77 cm2 (2.5-5.5cm2)  AoV Area,Vmax:           1.87 cm2 (2.5-4.5cm2)  AoV Dimensionless Index: 0.56       RIGHT VENTRICLE:  RV Basal 4.64 cm  RV Mid   4.04 cm  RV Major 7.4 cm  TAPSE:   24.4 mm  RV s'    0.12 m/s       TRICUSPID VALVE/RVSP:          Normal Ranges:  Peak TR Velocity:     3.24 m/s  Est. RA Pressure:     3 mmHg  RV Syst Pressure:     45 mmHg  (< 30mmHg)  IVC Diam:             1.94 cm       [id number] Sd Mcnulty MD  Electronically signed on 2025 at 2:59:07 PM       ** Final **      Physical Exam  Constitutional:       General: She is in acute distress.      Appearance: She is ill-appearing.   HENT:      Head: Normocephalic.      Mouth/Throat:      Mouth: Mucous membranes are moist.      Pharynx: Oropharynx is clear.   Eyes:      Pupils: Pupils are equal, round, and reactive to light.   Cardiovascular:      Rate and Rhythm: Normal rate and regular rhythm.      Heart sounds: Normal heart sounds. No murmur heard.     No gallop.   Pulmonary:      Effort: Respiratory distress present.      Breath sounds: Wheezing and rales present.   Abdominal:      General: Bowel sounds are normal. There is no distension.      Palpations: Abdomen is soft.      Tenderness: There is no abdominal tenderness.   Musculoskeletal:         General: No swelling. Normal range of motion.      Cervical back: Neck supple. No rigidity.   Skin:     General: Skin is warm and dry.   Neurological:      General: No focal deficit present.      Mental Status: She is alert.      Cranial Nerves: No  cranial nerve deficit.      Sensory: No sensory deficit.   Psychiatric:         Mood and Affect: Mood normal.         Behavior: Behavior normal.         Relevant Results             Scheduled medications  amLODIPine, 5 mg, oral, Daily  atorvastatin, 10 mg, oral, Daily  azelastine, 1 spray, Each Nostril, BID  donepezil, 10 mg, oral, Nightly  doxycycline, 100 mg, oral, q12h DAQUAN  fluticasone, 2 spray, Each Nostril, Daily  fluticasone furoate-vilanteroL, 1 puff, inhalation, Daily  furosemide, 40 mg, intravenous, BID  guaiFENesin, 1,200 mg, oral, BID  metOLazone, 2.5 mg, oral, Daily  oxygen, , inhalation, Continuous - Inhalation  pantoprazole, 40 mg, oral, Daily before breakfast   Or  pantoprazole, 40 mg, intravenous, Daily before breakfast  polyethylene glycol, 17 g, oral, Daily      Continuous medications     PRN medications  PRN medications: acetaminophen, albuterol, alteplase, benzocaine-menthol, bisacodyl, bisacodyl, heparin flush, heparin flush, melatonin, ondansetron **OR** ondansetron  Results for orders placed or performed during the hospital encounter of 01/03/25 (from the past 96 hours)   Sars-CoV-2 and Influenza A/B PCR   Result Value Ref Range    Flu A Result Not Detected Not Detected    Flu B Result Not Detected Not Detected    Coronavirus 2019, PCR Not Detected Not Detected   RSV PCR   Result Value Ref Range    RSV PCR Detected (A) Not Detected   CBC and Auto Differential   Result Value Ref Range    WBC 5.2 4.4 - 11.3 x10*3/uL    nRBC 0.0 0.0 - 0.0 /100 WBCs    RBC 4.09 4.00 - 5.20 x10*6/uL    Hemoglobin 10.8 (L) 12.0 - 16.0 g/dL    Hematocrit 36.0 36.0 - 46.0 %    MCV 88 80 - 100 fL    MCH 26.4 26.0 - 34.0 pg    MCHC 30.0 (L) 32.0 - 36.0 g/dL    RDW 14.6 (H) 11.5 - 14.5 %    Platelets 149 (L) 150 - 450 x10*3/uL    Neutrophils % 79.2 40.0 - 80.0 %    Immature Granulocytes %, Automated 0.4 0.0 - 0.9 %    Lymphocytes % 9.4 13.0 - 44.0 %    Monocytes % 10.4 2.0 - 10.0 %    Eosinophils % 0.2 0.0 - 6.0 %     Basophils % 0.4 0.0 - 2.0 %    Neutrophils Absolute 4.12 1.60 - 5.50 x10*3/uL    Immature Granulocytes Absolute, Automated 0.02 0.00 - 0.50 x10*3/uL    Lymphocytes Absolute 0.49 (L) 0.80 - 3.00 x10*3/uL    Monocytes Absolute 0.54 0.05 - 0.80 x10*3/uL    Eosinophils Absolute 0.01 0.00 - 0.40 x10*3/uL    Basophils Absolute 0.02 0.00 - 0.10 x10*3/uL   Comprehensive metabolic panel   Result Value Ref Range    Glucose 121 (H) 74 - 99 mg/dL    Sodium 138 136 - 145 mmol/L    Potassium 4.3 3.5 - 5.3 mmol/L    Chloride 104 98 - 107 mmol/L    Bicarbonate 28 21 - 32 mmol/L    Anion Gap 10 10 - 20 mmol/L    Urea Nitrogen 17 6 - 23 mg/dL    Creatinine 0.94 0.50 - 1.05 mg/dL    eGFR 61 >60 mL/min/1.73m*2    Calcium 9.1 8.6 - 10.3 mg/dL    Albumin 4.1 3.4 - 5.0 g/dL    Alkaline Phosphatase 68 33 - 136 U/L    Total Protein 6.8 6.4 - 8.2 g/dL    AST 19 9 - 39 U/L    Bilirubin, Total 1.0 0.0 - 1.2 mg/dL    ALT 10 7 - 45 U/L   B-Type Natriuretic Peptide   Result Value Ref Range     (H) 0 - 99 pg/mL   Blood Gas Venous   Result Value Ref Range    POCT pH, Venous 7.37 7.33 - 7.43 pH    POCT pCO2, Venous 47 41 - 51 mm Hg    POCT pO2, Venous 55 (H) 35 - 45 mm Hg    POCT SO2, Venous 83 (H) 45 - 75 %    POCT Oxy Hemoglobin, Venous 81.6 (H) 45.0 - 75.0 %    POCT Base Excess, Venous 1.3 -2.0 - 3.0 mmol/L    POCT HCO3 Calculated, Venous 27.2 (H) 22.0 - 26.0 mmol/L    Patient Temperature 37.0 degrees Celsius    FiO2 28 %   Troponin I, High Sensitivity, Initial   Result Value Ref Range    Troponin I, High Sensitivity 66 (HH) 0 - 13 ng/L   Blood Culture    Specimen: Peripheral Venipuncture; Blood culture   Result Value Ref Range    Blood Culture No growth at 1 day    Blood Culture    Specimen: Peripheral Venipuncture; Blood culture   Result Value Ref Range    Blood Culture No growth at 1 day    Lactate   Result Value Ref Range    Lactate 1.6 0.4 - 2.0 mmol/L   Troponin, High Sensitivity, 1 Hour   Result Value Ref Range    Troponin I, High  Sensitivity 70 (HH) 0 - 13 ng/L   Protime-INR   Result Value Ref Range    Protime 32.4 (H) 9.8 - 12.8 seconds    INR 2.8 (H) 0.9 - 1.1   Troponin I, High Sensitivity   Result Value Ref Range    Troponin I, High Sensitivity 57 (HH) 0 - 13 ng/L   Urinalysis with Reflex Culture and Microscopic   Result Value Ref Range    Color, Urine Yellow Light-Yellow, Yellow, Dark-Yellow    Appearance, Urine Ex.Turbid (N) Clear    Specific Gravity, Urine 1.043 (N) 1.005 - 1.035    pH, Urine 5.5 5.0, 5.5, 6.0, 6.5, 7.0, 7.5, 8.0    Protein, Urine 100 (2+) (A) NEGATIVE, 10 (TRACE), 20 (TRACE) mg/dL    Glucose, Urine Normal Normal mg/dL    Blood, Urine 0.2 (2+) (A) NEGATIVE    Ketones, Urine NEGATIVE NEGATIVE mg/dL    Bilirubin, Urine NEGATIVE NEGATIVE    Urobilinogen, Urine 2 (1+) (A) Normal mg/dL    Nitrite, Urine NEGATIVE NEGATIVE    Leukocyte Esterase, Urine NEGATIVE NEGATIVE   Extra Urine Gray Tube   Result Value Ref Range    Extra Tube Hold for add-ons.    Urinalysis Microscopic   Result Value Ref Range    WBC, Urine NONE 1-5, NONE /HPF    RBC, Urine >20 (A) NONE, 1-2, 3-5 /HPF    Squamous Epithelial Cells, Urine 1-9 (SPARSE) Reference range not established. /HPF    Calcium Oxalate Crystals, Urine 4+ (A) NONE, 1+ /HPF   Troponin I, High Sensitivity   Result Value Ref Range    Troponin I, High Sensitivity 55 (HH) 0 - 13 ng/L   Comprehensive Metabolic Panel   Result Value Ref Range    Glucose      Sodium      Potassium      Chloride      Bicarbonate      Anion Gap      Urea Nitrogen      Creatinine      eGFR      Calcium      Albumin      Alkaline Phosphatase      Total Protein      AST      Bilirubin, Total      ALT     Magnesium   Result Value Ref Range    Magnesium     Protime-INR   Result Value Ref Range    Protime      INR     CBC and Auto Differential   Result Value Ref Range    WBC 5.8 4.4 - 11.3 x10*3/uL    nRBC 0.0 0.0 - 0.0 /100 WBCs    RBC 3.83 (L) 4.00 - 5.20 x10*6/uL    Hemoglobin 9.9 (L) 12.0 - 16.0 g/dL    Hematocrit  33.8 (L) 36.0 - 46.0 %    MCV 88 80 - 100 fL    MCH 25.8 (L) 26.0 - 34.0 pg    MCHC 29.3 (L) 32.0 - 36.0 g/dL    RDW 14.7 (H) 11.5 - 14.5 %    Platelets 139 (L) 150 - 450 x10*3/uL    Neutrophils % 81.8 40.0 - 80.0 %    Immature Granulocytes %, Automated 0.3 0.0 - 0.9 %    Lymphocytes % 7.1 13.0 - 44.0 %    Monocytes % 8.7 2.0 - 10.0 %    Eosinophils % 1.4 0.0 - 6.0 %    Basophils % 0.7 0.0 - 2.0 %    Neutrophils Absolute 4.71 1.60 - 5.50 x10*3/uL    Immature Granulocytes Absolute, Automated 0.02 0.00 - 0.50 x10*3/uL    Lymphocytes Absolute 0.41 (L) 0.80 - 3.00 x10*3/uL    Monocytes Absolute 0.50 0.05 - 0.80 x10*3/uL    Eosinophils Absolute 0.08 0.00 - 0.40 x10*3/uL    Basophils Absolute 0.04 0.00 - 0.10 x10*3/uL   Comprehensive metabolic panel   Result Value Ref Range    Glucose 103 (H) 74 - 99 mg/dL    Sodium 140 136 - 145 mmol/L    Potassium 4.2 3.5 - 5.3 mmol/L    Chloride 100 98 - 107 mmol/L    Bicarbonate 32 21 - 32 mmol/L    Anion Gap 12 10 - 20 mmol/L    Urea Nitrogen 16 6 - 23 mg/dL    Creatinine 1.04 0.50 - 1.05 mg/dL    eGFR 54 (L) >60 mL/min/1.73m*2    Calcium 8.9 8.6 - 10.3 mg/dL    Albumin 4.1 3.4 - 5.0 g/dL    Alkaline Phosphatase 66 33 - 136 U/L    Total Protein 7.1 6.4 - 8.2 g/dL    AST 31 9 - 39 U/L    Bilirubin, Total 1.1 0.0 - 1.2 mg/dL    ALT 11 7 - 45 U/L   Magnesium   Result Value Ref Range    Magnesium 2.09 1.60 - 2.40 mg/dL   Protime-INR   Result Value Ref Range    Protime 31.2 (H) 9.8 - 12.8 seconds    INR 2.7 (H) 0.9 - 1.1   Transthoracic Echo (TTE) Complete   Result Value Ref Range    LVOT diam 2.00 cm    LV Biplane EF 58 %    MV E/A ratio 2.01     Tricuspid annular plane systolic excursion 2.4 cm    AV mn grad 5 mmHg    LA vol index A/L 48.7 ml/m2    AV pk song 1.43 m/s    LV EF 58 %    RV free wall pk S' 11.80 cm/s    RVSP 45.0 mmHg    LVIDd 5.22 cm    Aortic Valve Area by Continuity of VTI 1.77 cm2    Aortic Valve Area by Continuity of Peak Velocity 1.87 cm2    AV pk grad 8 mmHg    LV  A4C EF 57.7    Protime-INR   Result Value Ref Range    Protime 39.1 (H) 9.8 - 12.8 seconds    INR 3.4 (H) 0.9 - 1.1   Basic Metabolic Panel   Result Value Ref Range    Glucose 106 (H) 74 - 99 mg/dL    Sodium 140 136 - 145 mmol/L    Potassium 3.0 (L) 3.5 - 5.3 mmol/L    Chloride 103 98 - 107 mmol/L    Bicarbonate 30 21 - 32 mmol/L    Anion Gap 10 10 - 20 mmol/L    Urea Nitrogen 14 6 - 23 mg/dL    Creatinine 0.79 0.50 - 1.05 mg/dL    eGFR 76 >60 mL/min/1.73m*2    Calcium 7.4 (L) 8.6 - 10.3 mg/dL   CBC   Result Value Ref Range    WBC 6.5 4.4 - 11.3 x10*3/uL    nRBC 0.0 0.0 - 0.0 /100 WBCs    RBC 3.89 (L) 4.00 - 5.20 x10*6/uL    Hemoglobin 10.5 (L) 12.0 - 16.0 g/dL    Hematocrit 34.6 (L) 36.0 - 46.0 %    MCV 89 80 - 100 fL    MCH 27.0 26.0 - 34.0 pg    MCHC 30.3 (L) 32.0 - 36.0 g/dL    RDW 14.6 (H) 11.5 - 14.5 %    Platelets 141 (L) 150 - 450 x10*3/uL       Assessment/Plan   This patient currently has cardiac telemetry ordered; if you would like to modify or discontinue the telemetry order, click here to go to the orders activity to modify/discontinue the order.              Assessment & Plan  Acute hypoxic respiratory failure (Multi)      1. Acute hypoxic respiratory failure (Multi)      continue oxygen therapy, wean as possible, manage underlying CHF and RSV infection      2. RSV bronchiolitis      supportive care, steroids and bronchodilator. add empirical Doxycycline for bacterial infection and add Breo ellipta for wheezing.      3. Acute on chronic congestive heart failure, unspecified heart failure type  Transthoracic Echo (TTE) Complete    Transthoracic Echo (TTE) Complete    continue Lasix and Zaroxolyn      4. Elevated troponin  Transthoracic Echo (TTE) Complete    Transthoracic Echo (TTE) Complete    partially 2/2 pulmonary conditions, no evidence of ACS      5. ALONSO (dyspnea on exertion)  Transthoracic Echo (TTE) Complete    Transthoracic Echo (TTE) Complete      6. Primary hypertension      BP stable. add  norvasc 5 mg for better control      7. Obesity, Class I, BMI 30-34.9        8. Permanent atrial fibrillation (Multi)      HR 60 s/p pacemaker      9. Sick sinus syndrome (Multi)      s/p pacemaker, HR 60, no beta blocker per outpatient management                      Elo Sameuls MD

## 2025-01-05 NOTE — PROGRESS NOTES
Pharmacy Consult for Warfarin (Coumadin) Management - Daily Progress Note     Labs  INR   Date Value Ref Range Status   01/05/2025 3.4 (H) 0.9 - 1.1 Final   01/04/2025 2.7 (H) 0.9 - 1.1 Final   01/04/2025   Corrected     Comment:     Redrawn for possible contamination  Corrected result: Previously reported as 2.9 (reference range: 0.9-1.1) on 1/4/2025 at 0538 EST.     Review  Warfarin Indication: Atrial fibrillation or flutter  Target INR: 2 - 3     Doesn't look like pt. Was eating much prior to today, pt. Ate ~75% of breakfast today, will Hold warfarin today d/t spike from 2.7--> 3.4  Orders not placed. Pharmacy will continue to monitor and place anticoagulant orders following future INRs   Naman Cai, PharmD

## 2025-01-05 NOTE — CARE PLAN
The patient's goals for the shift include Pt. will have a safe, restful and uneventful evening    The clinical goals for the shift include Pt will have controlled cough/shift    Over the shift, the patient did not make progress toward the following goals. Barriers to progression include RSV. Recommendations to address these barriers include medication.

## 2025-01-05 NOTE — CARE PLAN
The patient's goals for the shift include Pt. will have a safe, restful and uneventful evening    The clinical goals for the shift include Pt. will have adequate oxygenation this shift      Problem: Skin  Goal: Decreased wound size/increased tissue granulation at next dressing change  Outcome: Progressing  Goal: Participates in plan/prevention/treatment measures  Outcome: Progressing  Goal: Prevent/manage excess moisture  Outcome: Progressing  Goal: Prevent/minimize sheer/friction injuries  Outcome: Progressing  Goal: Promote/optimize nutrition  Outcome: Progressing  Goal: Promote skin healing  Outcome: Progressing     Problem: Pain - Adult  Goal: Verbalizes/displays adequate comfort level or baseline comfort level  Outcome: Progressing     Problem: Safety - Adult  Goal: Free from fall injury  Outcome: Progressing     Problem: Discharge Planning  Goal: Discharge to home or other facility with appropriate resources  Outcome: Progressing     Problem: Chronic Conditions and Co-morbidities  Goal: Patient's chronic conditions and co-morbidity symptoms are monitored and maintained or improved  Outcome: Progressing     Problem: Respiratory  Goal: Clear secretions with interventions this shift  Outcome: Progressing  Goal: Minimize anxiety/maximize coping throughout shift  Outcome: Progressing  Goal: Minimal/no exertional discomfort or dyspnea this shift  Outcome: Progressing  Goal: No signs of respiratory distress (eg. Use of accessory muscles. Peds grunting)  Outcome: Progressing  Goal: Patent airway maintained this shift  Outcome: Progressing  Goal: Tolerate mechanical ventilation evidenced by VS/agitation level this shift  Outcome: Progressing  Goal: Tolerate pulmonary toileting this shift  Outcome: Progressing  Goal: Verbalize decreased shortness of breath this shift  Outcome: Progressing  Goal: Wean oxygen to maintain O2 saturation per order/standard this shift  Outcome: Progressing  Goal: Increase self care and/or  family involvement in next 24 hours  Outcome: Progressing

## 2025-01-05 NOTE — PROGRESS NOTES
History Of Present Illness:    Bethany Espinoza is a 79 y.o. female with PMHx s/f HTN, HLD, CAD, permanent atrial fibrillation on warfarin, SSS s/p PPM, chronic diastolic heart failure, mild pHTN, MS with MR, PVD, progressive supranuclear palsy (predominantly immobile; also has issues with dysphagia), obesity, presenting with SOB, cough, congestion, and worsening weakness. In short, pt was in her typical state of health until Monday (12/30/24) when she became congested and more short of breath than usual.  Patient and family was concerned this may have been viral or sinus infection as her grandson had visited and was just getting over RSV. Patient progressively worsened and patient came to hospital.  She saw the PCP in the interim and was prescribed with antibiotic.  Subsequently patient continued to deteriorate with worsening shortness of breath and eventually came to hospital.  She is compliant with her medical therapy.  She does not recall all of her medications though.        Outside cardiology records were reviewed.  Mrs Espinoza has a history of sick sinus syndrome and underwent a dual-chamber pacemaker implantation in 2009. At some point since her pacer implantation, she developed right ventricular pacemaker lead malfunction and this required lead replacement in February 2011. Around the same time, she became symptomatic with atrial fibrillation and required sotalol. She was later switched to Dofetilide. She developed of lead infection in 2011 and a pacemaker was removed. Subsequently, in December 2015, she underwent a single-chamber pacemaker placement for suspicion of bradycardia-induced symptoms of congestive heart failure.   In October 2018, she underwent coronary angiography which did not reveal significant coronary artery disease, but she had moderate to moderately severe mitral regurgitation. She subsequently underwent a transesophageal echocardiogram by me in November 2018, and this revealed that she  only had mild mitral regurgitation (Vena contract a measured 3 mm, the regurgitant orifice area by the proximal isovelocity hemispheric surface area method was 0.17 cm2).  She had a bout of congestive heart failure exacerbation in May 2020. Apparently she was prescribed metolazone for 3 days by her primary care provider. She said that she lost 11 pounds of water weight after she did that. She was also prescribed potassium with the Lasix. Her echocardiogram in 9/21 revealed an LV ejection fraction of 55%, normal right ventricular systolic function, with a dilated right ventricle. She has severe biatrial enlargement, mild mitral, mild to moderate tricuspid regurgitation.    She is currently taking Lasix 20 mg every day, along with metolazone only as necessary on Mondays Wednesdays and Fridays, if she has a weight gain of greater than 3 pounds in a day.     Subjective Data:  Patient denies chest pain she continues to be short of breath.  No pitting edema.  Asked x-ray did show pulmonary vascular congestion.    Overnight Events:    None     Objective Data:  Last Recorded Vitals:  Vitals:    01/05/25 0345 01/05/25 0400 01/05/25 0729 01/05/25 1100   BP: 141/71  116/66 150/79   BP Location: Left arm  Left arm    Patient Position: Lying  Lying Lying   Pulse: 60  61 60   Resp: 20  20 20   Temp: 36.6 °C (97.8 °F)  36.4 °C (97.6 °F) 36.6 °C (97.8 °F)   TempSrc: Temporal  Temporal Temporal   SpO2: (!) 88% 93% 91% 97%   Weight: 83.6 kg (184 lb 4.8 oz)      Height:           Last Labs:  CBC - 1/5/2025:  4:28 AM  6.5 10.5 141    34.6      CMP - 1/5/2025:  4:28 AM  7.4 7.1 31 --- 1.1   _ 4.1 11 66      PTT - No results in last year.  3.4   39.1 _     Results from last 7 days   Lab Units 01/05/25  0428 01/04/25  1014 01/03/25  1436   SODIUM mmol/L 140 140 138   POTASSIUM mmol/L 3.0* 4.2 4.3   CHLORIDE mmol/L 103 100 104   CO2 mmol/L 30 32 28   BUN mg/dL 14 16 17   CREATININE mg/dL 0.79 1.04 0.94   GLUCOSE mg/dL 106* 103* 121*    CALCIUM mg/dL 7.4* 8.9 9.1      Results from last 7 days   Lab Units 01/05/25  0428 01/04/25  0613 01/03/25  1436   WBC AUTO x10*3/uL 6.5 5.8 5.2   HEMOGLOBIN g/dL 10.5* 9.9* 10.8*   HEMATOCRIT % 34.6* 33.8* 36.0   PLATELETS AUTO x10*3/uL 141* 139* 149*      Results from last 7 days   Lab Units 01/04/25  1014   MAGNESIUM mg/dL 2.09      TROPHS   Date/Time Value Ref Range Status   01/03/2025 07:42 PM 55 0 - 13 ng/L Final     Comment:     Previous result verified on 1/3/2025 1516 on specimen/case [id number] called with component TRPHS for procedure Troponin I, High Sensitivity, Initial with value 66 ng/L.   01/03/2025 05:43 PM 57 0 - 13 ng/L Final     Comment:     Previous result verified on 1/3/2025 1516 on specimen/case [id number] called with component TRPHS for procedure Troponin I, High Sensitivity, Initial with value 66 ng/L.   01/03/2025 03:23 PM 70 0 - 13 ng/L Final     Comment:     Previous result verified on 1/3/2025 1516 on specimen/case [id number] called with component TRPHS for procedure Troponin I, High Sensitivity, Initial with value 66 ng/L.     BNP   Date/Time Value Ref Range Status   01/03/2025 02:36  0 - 99 pg/mL Final   10/17/2023 11:22  0 - 99 pg/mL Final      Last I/O:  I/O last 3 completed shifts:  In: 237 (2.8 mL/kg) [P.O.:237]  Out: 4300 (51.4 mL/kg) [Urine:4300 (1.4 mL/kg/hr)]  Weight: 83.6 kg     Past Cardiology Tests (Last 3 Years):  EKG:  No results found for this or any previous visit from the past 1095 days.    Echo:  Transthoracic Echo (TTE) Complete 01/04/2025  PHYSICIAN INTERPRETATION:  Left Ventricle: The left ventricular systolic function is normal, with a visually estimated ejection fraction of 55-60%. There are no regional wall motion abnormalities. The left ventricular cavity size is upper limits of normal. There is normal septal and normal posterior left ventricular wall thickness. Left ventricular diastolic filling was indeterminate.  Left Atrium:  The left atrium is moderately dilated. A bubble study using agitated saline was performed. Bubble study is negative.  Right Ventricle: The right ventricle is moderately enlarged. There is mildly reduced right ventricular systolic function. A device is visualized in the right ventricle.  Right Atrium: The right atrium is mildly dilated. There is a device visualized in the right atrium.  Aortic Valve: The aortic valve is trileaflet. There is mild aortic valve cusp calcification. The aortic valve dimensionless index is 0.56. There is no evidence of aortic valve regurgitation. The peak instantaneous gradient of the aortic valve is 8 mmHg. The mean gradient of the aortic valve is 5 mmHg.  Mitral Valve: The mitral valve is normal in structure. There is mild mitral annular calcification. There is trace mitral valve regurgitation.  Tricuspid Valve: The tricuspid valve is structurally normal. There is moderate tricuspid regurgitation. The Doppler estimated RVSP is mildly elevated right ventricular systolic pressure at 45.0 mmHg.  Pulmonic Valve: The pulmonic valve is structurally normal. There is physiologic pulmonic valve regurgitation.  Pericardium: Trivial pericardial effusion.  Aorta: The aortic root is normal. The ascending aorta was not well visualized.  Systemic Veins: The inferior vena cava appears normal in size, with IVC inspiratory collapse greater than 50%.  Ejection Fractions:  EF   Date/Time Value Ref Range Status   01/04/2025 02:34 PM 58 %      Cath:  No results found for this or any previous visit from the past 1095 days.    Stress Test:  No results found for this or any previous visit from the past 1095 days.    Cardiac Imaging:  No results found for this or any previous visit from the past 1095 days.      Inpatient Medications:  Scheduled medications   Medication Dose Route Frequency    amLODIPine  5 mg oral Daily    atorvastatin  10 mg oral Daily    azelastine  1 spray Each Nostril BID    donepezil  10 mg  oral Nightly    doxycycline  100 mg oral q12h DAQUAN    fluticasone  2 spray Each Nostril Daily    fluticasone furoate-vilanteroL  1 puff inhalation Daily    furosemide  40 mg intravenous BID    guaiFENesin  1,200 mg oral BID    metOLazone  2.5 mg oral Daily    oxygen   inhalation Continuous - Inhalation    pantoprazole  40 mg oral Daily before breakfast    Or    pantoprazole  40 mg intravenous Daily before breakfast    polyethylene glycol  17 g oral Daily    potassium chloride CR  20 mEq oral Once     PRN medications   Medication    acetaminophen    albuterol    alteplase    benzocaine-menthol    bisacodyl    bisacodyl    heparin flush    heparin flush    melatonin    ondansetron    Or    ondansetron     Continuous Medications   Medication Dose Last Rate       Physical Exam:  Alert and oriented, she does have shortness of breath.  She has mild JVD  She has crackles and wheezes anterior and posterior lungs.  S1-S2 is regular I do not appreciate a murmur  Abdomen is soft with normal bowel sounds  She does not have pitting edema in the lower extremities but she does have chronic changes consistent with PVD.       Assessment/Plan   Acute decompensated heart failure-diastolic heart failure  Acute hypoxic failure  RSV infection/pneumonia  Abnormal troponin elevation  Essential hypertension  Hyperlipidemia  Permanent atrial fibrillation  Sick sinus syndrome status post PPM  Progressive supranuclear palsy     Plan  -Suspect acute decompensation in the setting of underlying viral illness.  -Abnormal troponin elevation not suggestive of ACS.  This is likely related to underlying CHF/atypical pneumonia.  Discontinue checking when downtrended.  -Obtain TTE to evaluate for any new structural abnormalities.  -Suggest Lasix 40 mg IV twice daily.  -Suggest giving metolazone 2.5 mg daily at this time and when clinical status is stable we could then resume 3 times a week as needed as previously taking.  -Continue Coumadin for  anticoagulation.  -Resume home metoprolol.  -Add low-dose losartan for better blood pressure control.  -Management of RSV and other medical condition as per primary team.  -Telemetry monitoring.  Daily weight.  Strict I's and O's.  -We will consider cardioversion for a flutter if clinical status does not improve.     Cardiology will follow please call with any questions.    HFpEF - decompensation in the setting of pneumonia  Echocardiogram shows preserved EF, no RWMA, moderate TR and RVSP 45 mm hg. she continues to have symptoms of congestion will continue IV Lasix.  Renal function is stable.  Potassium is low.  Continue IV Lasix.   Will increase IV diuretic dose and monitor.   BMP/Mag in a.m.     2. Elevated Troponin:  trending down.  Not suggestive of ACS.  No RWMA on echocardiogram.     3. Chronic atrial fibrillation:  Telemetry is showing ventricular paced rhythm today. Chronic OAC with coumdin. INR 2.0-3.0. EKG shows atrial flutter with ventricular pacing.   HR is controlled currently.     4. RSV:  treatment per IMS.     5. Hypokalemia:   Will give additional potassium supplement today.  Recheck lab in a.m.     Code Status:  Full Code    I spent 15 minutes in the professional and overall care of this patient.        Freida Baker, APRN-CNP

## 2025-01-05 NOTE — NURSING NOTE
"Pt refusing most PO medication this morning. Agreeable to mucinex, refused BP & anti HLD pills stating she \"took too much yesterday,\" (does not wish to take a lot of pills, offered to crush them in applesauce, pt declined.) RN attempted x3 between [id number], educated on risks of noncompliance.   "

## 2025-01-06 LAB
ANION GAP SERPL CALC-SCNC: 8 MMOL/L (ref 10–20)
ATRIAL RATE: 288 BPM
BUN SERPL-MCNC: 13 MG/DL (ref 6–23)
CALCIUM SERPL-MCNC: 8.5 MG/DL (ref 8.6–10.3)
CHLORIDE SERPL-SCNC: 101 MMOL/L (ref 98–107)
CO2 SERPL-SCNC: 34 MMOL/L (ref 21–32)
CREAT SERPL-MCNC: 0.78 MG/DL (ref 0.5–1.05)
EGFRCR SERPLBLD CKD-EPI 2021: 77 ML/MIN/1.73M*2
GLUCOSE SERPL-MCNC: 83 MG/DL (ref 74–99)
INR PPP: 3.6 (ref 0.9–1.1)
MAGNESIUM SERPL-MCNC: 2.03 MG/DL (ref 1.6–2.4)
POTASSIUM SERPL-SCNC: 3.8 MMOL/L (ref 3.5–5.3)
PROTHROMBIN TIME: 41 SECONDS (ref 9.8–12.8)
Q ONSET: 193 MS
Q ONSET: 195 MS
Q ONSET: 197 MS
QRS COUNT: 10 BEATS
QRS DURATION: 134 MS
QRS DURATION: 138 MS
QRS DURATION: 140 MS
QT INTERVAL: 480 MS
QT INTERVAL: 484 MS
QT INTERVAL: 494 MS
QTC CALCULATION(BAZETT): 480 MS
QTC CALCULATION(BAZETT): 484 MS
QTC CALCULATION(BAZETT): 494 MS
QTC FREDERICIA: 480 MS
QTC FREDERICIA: 484 MS
QTC FREDERICIA: 494 MS
R AXIS: -82 DEGREES
R AXIS: -84 DEGREES
R AXIS: -87 DEGREES
SODIUM SERPL-SCNC: 139 MMOL/L (ref 136–145)
T AXIS: 79 DEGREES
T AXIS: 86 DEGREES
T AXIS: 94 DEGREES
T OFFSET: 437 MS
T OFFSET: 437 MS
T OFFSET: 440 MS
VENTRICULAR RATE: 60 BPM

## 2025-01-06 PROCEDURE — 2060000001 HC INTERMEDIATE ICU ROOM DAILY

## 2025-01-06 PROCEDURE — 2500000002 HC RX 250 W HCPCS SELF ADMINISTERED DRUGS (ALT 637 FOR MEDICARE OP, ALT 636 FOR OP/ED): Performed by: NURSE PRACTITIONER

## 2025-01-06 PROCEDURE — 2500000001 HC RX 250 WO HCPCS SELF ADMINISTERED DRUGS (ALT 637 FOR MEDICARE OP): Performed by: STUDENT IN AN ORGANIZED HEALTH CARE EDUCATION/TRAINING PROGRAM

## 2025-01-06 PROCEDURE — 97535 SELF CARE MNGMENT TRAINING: CPT | Mod: GO | Performed by: OCCUPATIONAL THERAPIST

## 2025-01-06 PROCEDURE — 2500000005 HC RX 250 GENERAL PHARMACY W/O HCPCS: Performed by: STUDENT IN AN ORGANIZED HEALTH CARE EDUCATION/TRAINING PROGRAM

## 2025-01-06 PROCEDURE — 83735 ASSAY OF MAGNESIUM: CPT | Performed by: NURSE PRACTITIONER

## 2025-01-06 PROCEDURE — 97166 OT EVAL MOD COMPLEX 45 MIN: CPT | Mod: GO | Performed by: OCCUPATIONAL THERAPIST

## 2025-01-06 PROCEDURE — 2500000004 HC RX 250 GENERAL PHARMACY W/ HCPCS (ALT 636 FOR OP/ED): Performed by: INTERNAL MEDICINE

## 2025-01-06 PROCEDURE — 82374 ASSAY BLOOD CARBON DIOXIDE: CPT | Performed by: INTERNAL MEDICINE

## 2025-01-06 PROCEDURE — 2500000001 HC RX 250 WO HCPCS SELF ADMINISTERED DRUGS (ALT 637 FOR MEDICARE OP): Performed by: INTERNAL MEDICINE

## 2025-01-06 PROCEDURE — 85610 PROTHROMBIN TIME: CPT

## 2025-01-06 PROCEDURE — 99232 SBSQ HOSP IP/OBS MODERATE 35: CPT | Performed by: NURSE PRACTITIONER

## 2025-01-06 PROCEDURE — 97162 PT EVAL MOD COMPLEX 30 MIN: CPT | Mod: GP | Performed by: PHYSICAL THERAPIST

## 2025-01-06 PROCEDURE — 36415 COLL VENOUS BLD VENIPUNCTURE: CPT

## 2025-01-06 PROCEDURE — 2500000004 HC RX 250 GENERAL PHARMACY W/ HCPCS (ALT 636 FOR OP/ED): Performed by: STUDENT IN AN ORGANIZED HEALTH CARE EDUCATION/TRAINING PROGRAM

## 2025-01-06 PROCEDURE — 99233 SBSQ HOSP IP/OBS HIGH 50: CPT | Performed by: INTERNAL MEDICINE

## 2025-01-06 RX ORDER — NYSTATIN 100000 [USP'U]/G
1 POWDER TOPICAL 2 TIMES DAILY
Status: DISCONTINUED | OUTPATIENT
Start: 2025-01-06 | End: 2025-01-11 | Stop reason: HOSPADM

## 2025-01-06 RX ADMIN — FUROSEMIDE 40 MG: 10 INJECTION, SOLUTION INTRAMUSCULAR; INTRAVENOUS at 08:57

## 2025-01-06 RX ADMIN — Medication 4 L/MIN: at 08:59

## 2025-01-06 RX ADMIN — GUAIFENESIN 1200 MG: 600 TABLET ORAL at 08:57

## 2025-01-06 RX ADMIN — NYSTATIN 1 APPLICATION: 100000 POWDER TOPICAL at 21:03

## 2025-01-06 RX ADMIN — POTASSIUM CHLORIDE 20 MEQ: 1500 TABLET, EXTENDED RELEASE ORAL at 21:00

## 2025-01-06 RX ADMIN — FLUTICASONE PROPIONATE 2 SPRAY: 50 SPRAY, METERED NASAL at 08:57

## 2025-01-06 RX ADMIN — ATORVASTATIN CALCIUM 10 MG: 10 TABLET, FILM COATED ORAL at 08:57

## 2025-01-06 RX ADMIN — METOLAZONE 2.5 MG: 5 TABLET ORAL at 08:57

## 2025-01-06 RX ADMIN — DONEPEZIL HYDROCHLORIDE 10 MG: 5 TABLET ORAL at 21:00

## 2025-01-06 RX ADMIN — DOXYCYCLINE 100 MG: 100 CAPSULE ORAL at 08:57

## 2025-01-06 RX ADMIN — FUROSEMIDE 40 MG: 10 INJECTION, SOLUTION INTRAMUSCULAR; INTRAVENOUS at 14:02

## 2025-01-06 RX ADMIN — PANTOPRAZOLE SODIUM 40 MG: 40 TABLET, DELAYED RELEASE ORAL at 05:19

## 2025-01-06 RX ADMIN — POLYETHYLENE GLYCOL 3350 17 G: 17 POWDER, FOR SOLUTION ORAL at 08:57

## 2025-01-06 RX ADMIN — BENZOCAINE AND MENTHOL 1 LOZENGE: 15; 3.6 LOZENGE ORAL at 05:19

## 2025-01-06 RX ADMIN — NYSTATIN 1 APPLICATION: 100000 POWDER TOPICAL at 09:01

## 2025-01-06 RX ADMIN — GUAIFENESIN 1200 MG: 600 TABLET ORAL at 21:00

## 2025-01-06 RX ADMIN — AZELASTINE HYDROCHLORIDE 1 SPRAY: 137 SPRAY, METERED NASAL at 08:57

## 2025-01-06 RX ADMIN — FLUTICASONE FUROATE AND VILANTEROL TRIFENATATE 1 PUFF: 200; 25 POWDER RESPIRATORY (INHALATION) at 14:04

## 2025-01-06 RX ADMIN — AZELASTINE HYDROCHLORIDE 1 SPRAY: 137 SPRAY, METERED NASAL at 21:00

## 2025-01-06 RX ADMIN — Medication 3 L/MIN: at 20:36

## 2025-01-06 RX ADMIN — POTASSIUM CHLORIDE 20 MEQ: 1500 TABLET, EXTENDED RELEASE ORAL at 08:57

## 2025-01-06 RX ADMIN — DOXYCYCLINE 100 MG: 100 CAPSULE ORAL at 21:00

## 2025-01-06 RX ADMIN — BENZOCAINE AND MENTHOL 1 LOZENGE: 15; 3.6 LOZENGE ORAL at 21:01

## 2025-01-06 RX ADMIN — AMLODIPINE BESYLATE 5 MG: 5 TABLET ORAL at 08:57

## 2025-01-06 ASSESSMENT — COGNITIVE AND FUNCTIONAL STATUS - GENERAL
TURNING FROM BACK TO SIDE WHILE IN FLAT BAD: A LITTLE
MOVING FROM LYING ON BACK TO SITTING ON SIDE OF FLAT BED WITH BEDRAILS: A LOT
TOILETING: A LITTLE
MOVING FROM LYING ON BACK TO SITTING ON SIDE OF FLAT BED WITH BEDRAILS: A LITTLE
CLIMB 3 TO 5 STEPS WITH RAILING: TOTAL
DRESSING REGULAR LOWER BODY CLOTHING: A LITTLE
DRESSING REGULAR UPPER BODY CLOTHING: A LITTLE
MOBILITY SCORE: 16
TURNING FROM BACK TO SIDE WHILE IN FLAT BAD: A LITTLE
STANDING UP FROM CHAIR USING ARMS: A LITTLE
PERSONAL GROOMING: A LITTLE
MOBILITY SCORE: 11
WALKING IN HOSPITAL ROOM: A LOT
TOILETING: A LITTLE
HELP NEEDED FOR BATHING: TOTAL
DAILY ACTIVITIY SCORE: 18
TOILETING: TOTAL
DRESSING REGULAR UPPER BODY CLOTHING: A LITTLE
DAILY ACTIVITIY SCORE: 11
STANDING UP FROM CHAIR USING ARMS: A LITTLE
HELP NEEDED FOR BATHING: A LITTLE
EATING MEALS: A LITTLE
HELP NEEDED FOR BATHING: A LITTLE
EATING MEALS: A LITTLE
MOVING TO AND FROM BED TO CHAIR: A LOT
PERSONAL GROOMING: A LITTLE
WALKING IN HOSPITAL ROOM: A LITTLE
CLIMB 3 TO 5 STEPS WITH RAILING: A LITTLE
MOVING FROM LYING ON BACK TO SITTING ON SIDE OF FLAT BED WITH BEDRAILS: A LITTLE
EATING MEALS: A LITTLE
DRESSING REGULAR LOWER BODY CLOTHING: A LITTLE
MOVING TO AND FROM BED TO CHAIR: A LITTLE
PERSONAL GROOMING: A LITTLE
DRESSING REGULAR UPPER BODY CLOTHING: A LOT
CLIMB 3 TO 5 STEPS WITH RAILING: A LOT
DRESSING REGULAR LOWER BODY CLOTHING: TOTAL
MOVING TO AND FROM BED TO CHAIR: A LITTLE
WALKING IN HOSPITAL ROOM: A LOT
STANDING UP FROM CHAIR USING ARMS: A LOT
DAILY ACTIVITIY SCORE: 18
MOBILITY SCORE: 18
TURNING FROM BACK TO SIDE WHILE IN FLAT BAD: A LOT

## 2025-01-06 ASSESSMENT — PAIN SCALES - GENERAL
PAINLEVEL_OUTOF10: 0 - NO PAIN

## 2025-01-06 ASSESSMENT — ACTIVITIES OF DAILY LIVING (ADL)
ADL_ASSISTANCE: INDEPENDENT
LACK_OF_TRANSPORTATION: NO
HOME_MANAGEMENT_TIME_ENTRY: 14

## 2025-01-06 ASSESSMENT — PAIN - FUNCTIONAL ASSESSMENT
PAIN_FUNCTIONAL_ASSESSMENT: 0-10

## 2025-01-06 NOTE — PROGRESS NOTES
Social work consult placed for discharge planning. SW reviewed pt's chart and communicated with TCC. No SW needs foreseen at this time. SW signing off; available upon request.    ELLA Means ([id number])   Care Transitions

## 2025-01-06 NOTE — PROGRESS NOTES
Pharmacy Consult for Warfarin (Coumadin) Management - Daily Progress Note     Bethany Espinoza is a 79 y.o. female admitted for Acute hypoxic respiratory failure (Multi). Pharmacy was consulted for warfarin dosing and monitoring.     Labs  INR   Date Value Ref Range Status   01/06/2025 3.6 (H) 0.9 - 1.1 Final   01/05/2025 3.4 (H) 0.9 - 1.1 Final   01/04/2025 2.7 (H) 0.9 - 1.1 Final     Review  Warfarin Indication: Atrial fibrillation or flutter  Target INR: 2 - 3     INR above goal at 3.6 today. Patient is on doxycycline. Will hold warfarin today since INR still trending up.    Orders not placed. Pharmacy will continue to monitor and place anticoagulant orders following INR trending toward goal.   Juan Ortega [facility]

## 2025-01-06 NOTE — CARE PLAN
The patient's goals for the shift include Pt. will have a safe, restful and uneventful evening    The clinical goals for the shift include Pt. will have a decrease in cough this shift      Problem: Skin  Goal: Decreased wound size/increased tissue granulation at next dressing change  Outcome: Progressing  Goal: Participates in plan/prevention/treatment measures  Outcome: Progressing  Goal: Prevent/manage excess moisture  Outcome: Progressing  Goal: Prevent/minimize sheer/friction injuries  Outcome: Progressing  Goal: Promote/optimize nutrition  Outcome: Progressing  Goal: Promote skin healing  Outcome: Progressing     Problem: Pain - Adult  Goal: Verbalizes/displays adequate comfort level or baseline comfort level  Outcome: Progressing     Problem: Safety - Adult  Goal: Free from fall injury  Outcome: Progressing     Problem: Discharge Planning  Goal: Discharge to home or other facility with appropriate resources  Outcome: Progressing     Problem: Chronic Conditions and Co-morbidities  Goal: Patient's chronic conditions and co-morbidity symptoms are monitored and maintained or improved  Outcome: Progressing     Problem: Respiratory  Goal: Clear secretions with interventions this shift  Outcome: Progressing  Goal: Minimize anxiety/maximize coping throughout shift  Outcome: Progressing  Goal: Minimal/no exertional discomfort or dyspnea this shift  Outcome: Progressing  Goal: No signs of respiratory distress (eg. Use of accessory muscles. Peds grunting)  Outcome: Progressing  Goal: Patent airway maintained this shift  Outcome: Progressing  Goal: Tolerate mechanical ventilation evidenced by VS/agitation level this shift  Outcome: Progressing  Goal: Tolerate pulmonary toileting this shift  Outcome: Progressing  Goal: Verbalize decreased shortness of breath this shift  Outcome: Progressing  Goal: Wean oxygen to maintain O2 saturation per order/standard this shift  Outcome: Progressing  Goal: Increase self care and/or  family involvement in next 24 hours  Outcome: Progressing

## 2025-01-06 NOTE — PROGRESS NOTES
01/06/25 1532   Discharge Planning   Living Arrangements Spouse/significant other   Support Systems Spouse/significant other;Children   Assistance Needed minimal   Type of Residence Private residence   Home or Post Acute Services None   Expected Discharge Disposition Home   Housing Stability   At any time in the past 12 months, were you homeless or living in a shelter (including now)? N   Transportation Needs   In the past 12 months, has lack of transportation kept you from meetings, work, or from getting things needed for daily living? No   Intensity of Service   Intensity of Service 0-30 min     I spoke with patient to introduce discharge planning and explain role of TCC. Pt states she lives at home with her .  Her daughters help her with showering.  Pt is able to dress herself, can make a quick meal herself.  She does use a 6-wheel walker.  She does admit to falls at home.  Her /daughters drive her and do the grocery shopping. She confirmed her PCP is Tc Oropeza.  She is currently on O2 but does not wear at home.  She is expecting to return home on discharge.  PT/OT pending. Will follow for needs.

## 2025-01-06 NOTE — PROGRESS NOTES
Occupational Therapy    Evaluation    Patient Name: Bethany Espinoza  MRN: [id number]  Today's Date: 1/6/2025  Time Calculation  Start Time: 1105  Stop Time: 1139  Time Calculation (min): 34 min  2026/2026-A    Assessment  IP OT Assessment  OT Assessment: MIn.A X 2 for trfr. with FWW at slow pace, dep. LB ADLs  Prognosis: Good  Barriers to Discharge Home: Caregiver assistance  Caregiver Assistance: Caregiver assistance needed per identified barriers - however, level of patient's required assistance exceeds assistance available at home  Medical Staff Made Aware: Yes  End of Session Communication: Bedside nurse  End of Session Patient Position: Bed, 3 rail up, Alarm on       01/06/25 1105   Inpatient Plan   Treatment Interventions ADL retraining;Functional transfer training;Endurance training   OT Frequency 3 times per week   OT - OK to Discharge Yes  ((when medically approp.))   OT Discharge Recommendations Moderate intensity level of continued care   OT Recommended Transfer Status Assist of 2   Subjective     Current Problem:  1. Acute hypoxic respiratory failure (Multi)      continue oxygen therapy, wean as possible, manage underlying CHF and RSV infection      2. RSV bronchiolitis      supportive care, steroids and bronchodilator. add empirical Doxycycline for bacterial infection and add Breo ellipta for wheezing.      3. Acute on chronic congestive heart failure, unspecified heart failure type  Transthoracic Echo (TTE) Complete    Transthoracic Echo (TTE) Complete    continue Lasix and Zaroxolyn      4. Elevated troponin  Transthoracic Echo (TTE) Complete    Transthoracic Echo (TTE) Complete    partially 2/2 pulmonary conditions, no evidence of ACS      5. ALONSO (dyspnea on exertion)  Transthoracic Echo (TTE) Complete    Transthoracic Echo (TTE) Complete      6. Primary hypertension      BP stable. add norvasc 5 mg for better control      7. Obesity, Class I, BMI 30-34.9        8. Permanent atrial fibrillation (Multi)  "     HR 60 s/p pacemaker      9. Sick sinus syndrome (Multi)      s/p pacemaker, HR 60, no beta blocker per outpatient management          General:  General  Reason for Referral: acute hypoxic resp. failure, RSV, weakness  Referred By: Petty  Past Medical History Relevant to Rehab: Hx. of progressive supranuc. palsy, COPD,  Family/Caregiver Present: No  Co-Treatment: PT  Co-Treatment Reason: to maximize safe mobility  Prior to Session Communication: Bedside nurse  Patient Position Received: Bed, 3 rail up, Alarm on  General Comment: pt. slow to respond, agreeaable to OOB mobility    Precautions:  Medical Precautions: Fall precautions, Oxygen therapy device and L/min  Precautions Comment: contact + precautions    Vital Signs:see nurses notes        Pain:  Pain Assessment  Pain Assessment: 0-10  0-10 (Numeric) Pain Score: 0 - No pain    Objective     Cognition:  Overall Cognitive Status: Within Functional Limits  Processing Speed: Delayed         Home Living:  Type of Home: House  Lives With: Spouse (pt. states that  is very sick now also)  Home Adaptive Equipment:  (pt. stattes uses a \"u-walker\")  Home Layout: One level     Prior Function:  Level of [place]: Independent with ADLs and functional transfers  Receives Help From:  ( , dtrs. nearby)  ADL Assistance: Independent  Homemaking Assistance: Needs assistance  Meal Prep:  (assisted)  Ambulatory Assistance: Independent  Prior Function Comments: sleeps in recliner , amb. to/from bathroom with walker    IADL History:  Homemaking Responsibilities: No    ADL:  LE Dressing Assistance: Total  Toileting Assistance with Device:  (Min.A X 2 with FWW to/from BSC at very slow pace, dep. for hygiene and management of pull-ups)    Activity Tolerance:  Endurance: Decreased tolerance for upright activites    Bed Mobility/Transfers:   Bed Mobility  Bed Mobility:  (Mod.A X 2 to sit and to supine)  Transfers  Transfer: Yes (MIn.A X 2 at very slow pace and " very small steps with FWW)        Standing Balance:  Static Standing Balance  Static Standing-Balance Support:  (Min.A with FWW)    Vision: Vision - Basic Assessment  Current Vision: No visual deficits     Sensation:  Light Touch: No apparent deficits    Strength:  Strength Comments: UEs WFL    Perception:  Inattention/Neglect: Appears intact    Coordination:  Movements are Fluid and Coordinated: No  Coordination Comment: all moiblity at very slow pace     Hand Function:  Hand Function  Gross Grasp: Functional      Outcome Measures: [facility] Daily Activity  Putting on and taking off regular lower body clothing: Total  Bathing (including washing, rinsing, drying): Total  Putting on and taking off regular upper body clothing: A lot  Toileting, which includes using toilet, bedpan or urinal: Total  Taking care of personal grooming such as brushing teeth: A little  Eating Meals: A little  Daily Activity - Total Score: 11                       EDUCATION:     Education Documentation  ADL Training, taught by Griselda Day OT at 1/6/2025  2:53 PM.  Learner: Patient  Readiness: Acceptance  Method: Demonstration  Response: Needs Reinforcement  Comment: susan odom. training    Education Comments  No comments found.        Goals:   Encounter Problems       Encounter Problems (Active)       ADLs       Demo. Min.A UB bathing, grooming while seated EOB.  Demo. Mod.A toileting on BSC.  (Progressing)       Start:  01/06/25    Expected End:  01/13/25               TRANSFERS       Susan odoms. with Min.A and FWW  (Progressing)       Start:  01/06/25    Expected End:  01/13/25

## 2025-01-06 NOTE — PROGRESS NOTES
Speech-Language Pathology                 Therapy Communication Note    Patient Name: Bethany Espinoza  MRN: [id number]  Department: [facility] 2 W  Room: 2026/2026-A  Today's Date: 1/6/2025     Discipline: Speech Language Pathology          Missed Visit Reason:  PT/OT getting reading to assess pt.    Missed Time: Attempt    Comment: Per nursing pt ate 75% of breakfast without any issues.  PCA reported she repositioned pt on her side and prior to that she gave her sips of water without any difficulties.     SLP will try again later time and schedule permitting.     Pt had an MBS in mid December 2024 and recommended diet was Soft bite-sized diet and THIN Liquids with strategies.    Strategies:  Feed / Eat at a slow rate,   Sit upright 90 degrees for all PO,   Small Bite/Sip,   Rigid Oral Hygiene,   Supervision/Assistance for meals

## 2025-01-06 NOTE — PROGRESS NOTES
Physical Therapy    Physical Therapy Evaluation    Patient Name: Bethany Espinoza  MRN: [id number]  Today's Date: 1/6/2025   Time Calculation  Start Time: 1104  Stop Time: 1136  Time Calculation (min): 32 min  2026/2026-A    Assessment/Plan   PT Assessment  PT Assessment Results: Decreased strength, Decreased range of motion, Decreased endurance, Decreased mobility (NEURO restrictions)  Rehab Prognosis: Good  Barriers to Discharge Home: Caregiver assistance, Physical needs  Caregiver Assistance: Caregiver assistance needed per identified barriers - however, no caregiver assistance available at home (spouse is sick also)  Physical Needs: Ambulating household distances limited by function/safety, 24hr mobility assistance needed, High falls risk due to function or environment  Evaluation/Treatment Tolerance: Patient tolerated treatment well  End of Session Communication: Bedside nurse  Assessment Comment: Recommend MOD INTENSITY REHAB to restore maximal functional independence for home  End of Session Patient Position: Bed, 3 rail up, Alarm on  IP OR SWING BED PT PLAN  Inpatient or Swing Bed: Inpatient  PT Plan  Treatment/Interventions: Bed mobility, Transfer training, Gait training, Neuromuscular re-education, Therapeutic exercise, Therapeutic activity  PT Plan: Ongoing PT  PT Frequency: 4 times per week  PT Discharge Recommendations: Moderate intensity level of continued care  PT Recommended Transfer Status: Assist x2  PT - OK to Discharge: Yes    Subjective     General Visit Information:  General  Reason for Referral: cough, SOB, nausea, diarrhea,  unable to get pt OOB to bathroom  Referred By: JESSE GONZALEZ. PT FOR IMPAIRED MOBILITY/GAIT TRAINING  Past Medical History Relevant to Rehab: permanent AFIB, SSS s/p PACER, progressive supranuclear palsy, dysphagia  Family/Caregiver Present: No  Co-Treatment: OT  Co-Treatment Reason: optimize pt safety while focus on discipline specific goals  Prior to Session  "Communication: Bedside nurse  Patient Position Received: Bed, 3 rail up, Alarm on  General Comment: pt seen in ISOLATION ROOM wtih O2 2L, tele, purewick. pt very motivated for PT, gives max effort.    Home Living:  Home Living  Type of Home: House  Lives With: Spouse  Home Layout: One level  Home Living Comments: pt states SPOUSE IS VERY SICK NOW ALSO    Prior Level of Function:  Prior Function Per Pt/Caregiver Report  Receives Help From: Family (spouse, 3 dtr's live close by)  Ambulatory Assistance: Needs assistance  Transfers: Assistive device  Gait: Assistive device (pt states she can walk to bathroom using her \"U walker with 6 wheels\")  Prior Function Comments: sleeps on LIFT recliner    Precautions:  Precautions  Medical Precautions: Fall precautions  Precautions Comment: h/o Supranuclear Palsy (Parkinsonian type symptoms: rigidity, slow motor planning, flat affect. low volume speech)     Objective     Pain:  Pain Assessment  Pain Assessment: 0-10  0-10 (Numeric) Pain Score: 0 - No pain    Cognition:  Cognition  Overall Cognitive Status: Within Functional Limits  Processing Speed: Delayed    General Assessments:  General Observation  General Observation: transfer OOB, gait training bed<>BSC, RTB per pt preference (declined sugggestion of chair)   Activity Tolerance  Endurance: Decreased tolerance for upright activites  Activity Tolerance Comments: fatigue, gen weakness     Coordination  Movements are Fluid and Coordinated: No  Coordination Comment: slow motor processing     Dynamic Sitting Balance  Dynamic Sitting-Comments: good  Dynamic Standing Balance  Dynamic Standing-Comments: fair- walker support plus assist x2    Functional Assessments:     Bed Mobility 1  Bed Mobility 1: Supine to sitting, Sitting to supine  Level of Assistance 1: Moderate assistance, +2, Moderate tactile cues  Transfer 1  Transfer From 1: Bed to  Transfer to 1: Commode-standard  Technique 1: Sit to stand, Stand to sit  Transfer Device " 1: Walker  Transfer Level of Assistance 1: Minimum assistance, +2  Ambulation/Gait Training 1  Device 1: Rolling walker  Assistance 1: Minimum assistance, Maximum verbal cues, Maximum tactile cues (x2)  Quality of Gait 1:  (slow speed of movements, very small steps/feet barely clear floor, PT to facilitate amb or pt tends to step in place)  Comments/Distance (ft) 1: 3, 3  Stairs  Stairs: No       Extremity/Trunk Assessments:        RLE   RLE : Exceptions to WFL  AROM RLE (degrees)  RLE AROM Comment: gen rigidity, grossly 25% AAROM  Strength RLE  RLE Overall Strength: Deficits (grossly 3-/5)  LLE   LLE : Exceptions to WFL  AROM LLE (degrees)  LLE AROM Comment: gen rigidity, grossly 25% AAROM  Strength LLE  LLE Overall Strength: Deficits (grossly 3/5)    Outcome Measures:     [facility] Basic Mobility  Turning from your back to your side while in a flat bed without using bedrails: A lot  Moving from lying on your back to sitting on the side of a flat bed without using bedrails: A lot  Moving to and from bed to chair (including a wheelchair): A lot  Standing up from a chair using your arms (e.g. wheelchair or bedside chair): A lot  To walk in hospital room: A lot  Climbing 3-5 steps with railing: Total  Basic Mobility - Total Score: 11     Goals:  Encounter Problems       Encounter Problems (Active)       Mobility       STG - Patient will ambulate household distance using walker and no more than SB assist       Start:  01/06/25    Expected End:  01/20/25               PT Transfers       STG - Transfer from bed to chair/BSC using walker and no more than SB assist       Start:  01/06/25    Expected End:  01/20/25               Pain - Adult          Strengthening        Pt will perform 10+ reps AAROM/AROM/RROM BLE to improve functional strength needed for improved mobility.        Start:  01/06/25    Expected End:  01/20/25                 Education Documentation  Mobility Training, taught by Dian Valadez, PT at 1/6/2025   4:13 PM.  Learner: Patient  Readiness: Acceptance  Method: Explanation, Demonstration  Response: Verbalizes Understanding, Needs Reinforcement    Education Comments  No comments found.

## 2025-01-06 NOTE — PROGRESS NOTES
History Of Present Illness:    Bethany Espinoza is a 79 y.o. female with PMHx s/f HTN, HLD, CAD, permanent atrial fibrillation on warfarin, SSS s/p PPM, chronic diastolic heart failure, mild pHTN, MS with MR, PVD, progressive supranuclear palsy (predominantly immobile; also has issues with dysphagia), obesity, presenting with SOB, cough, congestion, and worsening weakness. In short, pt was in her typical state of health until Monday (12/30/24) when she became congested and more short of breath than usual.  Patient and family was concerned this may have been viral or sinus infection as her grandson had visited and was just getting over RSV. Patient progressively worsened and patient came to hospital.  She saw the PCP in the interim and was prescribed with antibiotic.  Subsequently patient continued to deteriorate with worsening shortness of breath and eventually came to hospital.  She is compliant with her medical therapy.  She does not recall all of her medications though.        Outside cardiology records were reviewed.  Mrs Espinoza has a history of sick sinus syndrome and underwent a dual-chamber pacemaker implantation in 2009. At some point since her pacer implantation, she developed right ventricular pacemaker lead malfunction and this required lead replacement in February 2011. Around the same time, she became symptomatic with atrial fibrillation and required sotalol. She was later switched to Dofetilide. She developed of lead infection in 2011 and a pacemaker was removed. Subsequently, in December 2015, she underwent a single-chamber pacemaker placement for suspicion of bradycardia-induced symptoms of congestive heart failure.   In October 2018, she underwent coronary angiography which did not reveal significant coronary artery disease, but she had moderate to moderately severe mitral regurgitation. She subsequently underwent a transesophageal echocardiogram by me in November 2018, and this revealed that she  only had mild mitral regurgitation (Vena contract a measured 3 mm, the regurgitant orifice area by the proximal isovelocity hemispheric surface area method was 0.17 cm2).  She had a bout of congestive heart failure exacerbation in May 2020. Apparently she was prescribed metolazone for 3 days by her primary care provider. She said that she lost 11 pounds of water weight after she did that. She was also prescribed potassium with the Lasix. Her echocardiogram in 9/21 revealed an LV ejection fraction of 55%, normal right ventricular systolic function, with a dilated right ventricle. She has severe biatrial enlargement, mild mitral, mild to moderate tricuspid regurgitation.    She is currently taking Lasix 20 mg every day, along with metolazone only as necessary on Mondays Wednesdays and Fridays, if she has a weight gain of greater than 3 pounds in a day.     Subjective Data:  Patient denies chest pain she continues to be short of breath.  No pitting edema.  Asked x-ray did show pulmonary vascular congestion.  1-6-25: Remains with dyspnea and very wheezy today.  No CP/pressure. Monitor: Afib with V pacing.  K 3.58, creatinine 0.78, INR 3.6. EF 55-60%    Overnight Events:    None     Objective Data:  Last Recorded Vitals:  Vitals:    01/05/25 2156 01/05/25 2330 01/06/25 0332 01/06/25 0732   BP:  146/75 118/61 138/67   BP Location:  Left arm Left arm Left arm   Patient Position:  Lying Lying Lying   Pulse:  60 60 60   Resp:  22 20 20   Temp:  36.3 °C (97.4 °F) 36.3 °C (97.3 °F) 36.2 °C (97.1 °F)   TempSrc:  Temporal Temporal Temporal   SpO2: 95% 94% 95% 98%   Weight:   84 kg (185 lb 3 oz)    Height:           Last Labs:      Results from last 7 days   Lab Units 01/06/25  0519 01/05/25  0428 01/04/25  1014   SODIUM mmol/L 139 140 140   POTASSIUM mmol/L 3.8 3.0* 4.2   CHLORIDE mmol/L 101 103 100   CO2 mmol/L 34* 30 32   BUN mg/dL 13 14 16   CREATININE mg/dL 0.78 0.79 1.04   GLUCOSE mg/dL 83 106* 103*   CALCIUM mg/dL 8.5*  7.4* 8.9      Results from last 7 days   Lab Units 01/05/25  0428 01/04/25  0613 01/03/25  1436   WBC AUTO x10*3/uL 6.5 5.8 5.2   HEMOGLOBIN g/dL 10.5* 9.9* 10.8*   HEMATOCRIT % 34.6* 33.8* 36.0   PLATELETS AUTO x10*3/uL 141* 139* 149*      Results from last 7 days   Lab Units 01/06/25  0519   MAGNESIUM mg/dL 2.03      TROPHS   Date/Time Value Ref Range Status   01/03/2025 07:42 PM 55 0 - 13 ng/L Final     Comment:     Previous result verified on 1/3/2025 1516 on specimen/case [id number] called with component TRPHS for procedure Troponin I, High Sensitivity, Initial with value 66 ng/L.   01/03/2025 05:43 PM 57 0 - 13 ng/L Final     Comment:     Previous result verified on 1/3/2025 1516 on specimen/case [id number] called with component TRPHS for procedure Troponin I, High Sensitivity, Initial with value 66 ng/L.   01/03/2025 03:23 PM 70 0 - 13 ng/L Final     Comment:     Previous result verified on 1/3/2025 1516 on specimen/case [id number] called with component TRPHS for procedure Troponin I, High Sensitivity, Initial with value 66 ng/L.     BNP   Date/Time Value Ref Range Status   01/03/2025 02:36  0 - 99 pg/mL Final   10/17/2023 11:22  0 - 99 pg/mL Final      Last I/O:  I/O last 3 completed shifts:  In: 237 (2.8 mL/kg) [P.O.:237]  Out: 725 (8.6 mL/kg) [Urine:725 (0.2 mL/kg/hr)]  Weight: 84 kg     Past Cardiology Tests (Last 3 Years):    Echo:  Transthoracic Echo (TTE) Complete 01/04/2025  PHYSICIAN INTERPRETATION:  Left Ventricle: The left ventricular systolic function is normal, with a visually estimated ejection fraction of 55-60%. There are no regional wall motion abnormalities. The left ventricular cavity size is upper limits of normal. There is normal septal and normal posterior left ventricular wall thickness. Left ventricular diastolic filling was indeterminate.  Left Atrium: The left atrium is moderately dilated. A bubble study using agitated saline was performed. Bubble study is  negative.  Right Ventricle: The right ventricle is moderately enlarged. There is mildly reduced right ventricular systolic function. A device is visualized in the right ventricle.  Right Atrium: The right atrium is mildly dilated. There is a device visualized in the right atrium.  Aortic Valve: The aortic valve is trileaflet. There is mild aortic valve cusp calcification. The aortic valve dimensionless index is 0.56. There is no evidence of aortic valve regurgitation. The peak instantaneous gradient of the aortic valve is 8 mmHg. The mean gradient of the aortic valve is 5 mmHg.  Mitral Valve: The mitral valve is normal in structure. There is mild mitral annular calcification. There is trace mitral valve regurgitation.  Tricuspid Valve: The tricuspid valve is structurally normal. There is moderate tricuspid regurgitation. The Doppler estimated RVSP is mildly elevated right ventricular systolic pressure at 45.0 mmHg.  Pulmonic Valve: The pulmonic valve is structurally normal. There is physiologic pulmonic valve regurgitation.  Pericardium: Trivial pericardial effusion.  Aorta: The aortic root is normal. The ascending aorta was not well visualized.  Systemic Veins: The inferior vena cava appears normal in size, with IVC inspiratory collapse greater than 50%.  Ejection Fractions:  EF   Date/Time Value Ref Range Status   01/04/2025 02:34 PM 58 %          Inpatient Medications:  Scheduled medications   Medication Dose Route Frequency    amLODIPine  5 mg oral Daily    atorvastatin  10 mg oral Daily    azelastine  1 spray Each Nostril BID    donepezil  10 mg oral Nightly    doxycycline  100 mg oral q12h DAQUAN    fluticasone  2 spray Each Nostril Daily    fluticasone furoate-vilanteroL  1 puff inhalation Daily    furosemide  40 mg intravenous BID    guaiFENesin  1,200 mg oral BID    metOLazone  2.5 mg oral Daily    nystatin  1 Application Topical BID    oxygen   inhalation Continuous - Inhalation    pantoprazole  40 mg oral  Daily before breakfast    Or    pantoprazole  40 mg intravenous Daily before breakfast    polyethylene glycol  17 g oral Daily    potassium chloride CR  20 mEq oral BID     PRN medications   Medication    acetaminophen    albuterol    alteplase    benzocaine-menthol    bisacodyl    bisacodyl    heparin flush    heparin flush    melatonin    ondansetron    Or    ondansetron     Continuous Medications   Medication Dose Last Rate       Physical Exam:  Alert and oriented, she does have shortness of breath.  No JVD noted.  She has crackles and wheezes anterior and posterior lungs.  S1-S2 is regular I do not appreciate a murmur. V paced rhythm.  Abdomen is soft with normal bowel sounds  LE's with chronic vascular skin changes and mild edema     Assessment/Plan   Acute decompensated heart failure-diastolic heart failure  Acute hypoxic failure  RSV infection/pneumonia  Abnormal troponin elevation  Essential hypertension  Hyperlipidemia  Permanent atrial fibrillation  Sick sinus syndrome status post PPM  Progressive supranuclear palsy     Plan  -Suspect acute decompensation in the setting of underlying viral illness.  -Abnormal troponin elevation not suggestive of ACS.  This is likely related to underlying CHF/atypical pneumonia.  Discontinue checking when downtrended.  -Obtain TTE to evaluate for any new structural abnormalities.  -Suggest Lasix 40 mg IV twice daily.  -Suggest giving metolazone 2.5 mg daily at this time and when clinical status is stable we could then resume 3 times a week as needed as previously taking.  -Continue Coumadin for anticoagulation.  -Resume home metoprolol.  -Add low-dose losartan for better blood pressure control.  -Management of RSV and other medical condition as per primary team.  -Telemetry monitoring.  Daily weight.  Strict I's and O's.  -We will consider cardioversion for a flutter if clinical status does not improve.     Cardiology will follow please call with any questions.    HFpEF -  decompensation in the setting of pneumonia  Echocardiogram shows preserved EF, no RWMA, moderate TR and RVSP 45 mm hg. she continues to have symptoms of congestion will continue IV Lasix.  Renal function is stable.  Potassium is low.  Continue IV Lasix.   Will increase IV diuretic dose and monitor.   BMP/Mag in a.m.    1-6-25:  Still with dyspnea and cough.  Wheezes and crackles noted.  Would continue on IV Lasix and metolazone for now. BNP elevated.     2. Elevated Troponin:  trending down.  Not suggestive of ACS.  No RWMA on echocardiogram.     1-6-25: No CP/pressure. Troponins were flat.    3. Chronic atrial fibrillation:  Telemetry is showing ventricular paced rhythm today. Chronic OAC with coumdin. INR 2.0-3.0. EKG shows atrial flutter with ventricular pacing.   HR is controlled currently.     1-6-25: Afib with Vpacing on tele.  [facility] to follow warfarin, INR 3.6 today.     4. RSV:  treatment per IMS.     5. Hypokalemia:   Will give additional potassium supplement today.  Recheck lab in a.m.     1-6-25: K stable at 3.8    Code Status:  Full Code        Ruchi Pruitt, APRN-CNP

## 2025-01-06 NOTE — PROGRESS NOTES
"Bethany Espinoza is a 79 y.o. female on day 2 of admission presenting with Acute hypoxic respiratory failure (Multi).      Subjective   Bethany Espinoza is a 79 y.o. female with PMHx s/f HTN, HLD, CAD, Afib/flutter on warfarin, SSS s/p PPM, chronic diastolic heart failure, pHTN, valvular heart disease, PVD, progressive supranuclear palsy (predominantly immobile; also has issues with dysphagia), obesity, presenting with SOB, cough, congestion, and worsening weakness. Hx mostly obtained from her daughter at the bedside. In short, pt was in her typical state of health until Monday (12/30/24) when she became congested. Was concerned this may have been RSV or a sinus infection as her grandson had visited and was just getting over RSV. She went to see her PCP and was prescribed decongestants and Augmentin 12/31/24 to help with symptoms; tested negative for Flu, COVID, and ?RSV at that time. Over the next few days, daughter called in to check on her; until day of admission (01/03/2025), patient had reported that she had been doing okay, but today she informed her daughter that she was feeling unwell and needed assistance. When her daughter arrived, she found the patient to sound like she was \"drowning\" in her fluid around her lungs which has happened with prior issues with her heart failure, additionally she had been so weak that her patient's  was unable to help get her up out of bed to go to the bathroom and she did end up soiling herself. Patient reports that she has felt nauseous and has had diarrhea x 1 day as well, does not appear to have missed any medications in terms of heart failure; however, has just been generally unwell. Denies cp/pressure, palpitations, diaphoresis, dizziness / lightheadedness, syncope or near syncope, HA, vision changes, f/c, abd pain. Has chronic baseline lower extremity edema which is unchanged times many years per daughter at the bedside.     ED Course (Summary - please note all " labs, imaging studies, and interventions noted below have been personally reviewed and/or interpreted on day of admission):   Vitals on presentation: T98.2, /83, HR 60, RR 30 (most recently 24), SpO2 96% 2 L nasal cannula  Labs: CBC with WBC 5.2, Hgb 10.8, platelets 149.  CMP with glucose 121, sodium 138, potassium 4.3, BUN 17, serum creatinine 0.94.  Lactate 1.6.  .  High-sensitivity troponin [id number]-[id number] on admission 57 and 55.  PT/INR: 22.4/2.8.  RSV positive; flu A, B, COVID PCR all negative.  VBG relatively unrevealing.  UA without evidence of infection.  EKG: Sinus with short OH; right bundle branch block which has been previously seen on prior EKGs as well, no overt acute ischemic changes compared to prior  Imaging: CXR with moderate cardiomegaly, vascular congestion  Interventions: No medications given in the ED; cardiology was consulted for elevated troponin and they felt given the EKG stability compared to prior there was no need for heparinization unless the patient was not therapeutic with her warfarin.  Admitted to medicine for further management     12-point ROS reviewed and found to be negative aside from aforementioned positives in HPI and/or noted in dedicated ROS section below.      1/4/2025: Patient c/o shortness of breath. She remained on NC oxygen 4 L. Cardiology note appreciated. Add Norvasc and Zaroxolyn. Due to HR 60, no home meds of metoprolol listed.     1/5/2025: Patient remained shortness of breath and wheezing. She is on NC oxygen 3.5 L. Breo ellipta ordered for wheezing. Add empirical Doxycyclione for possible bacterial infection  1/6/2025: Patient was seen and examined.  Continues to complain of dry cough and some stuffy nose.  I we will add a humidifier to patient's oxygen.  Saturating 97% on 4 L will have weaned down to 3 and will continue weaning as tolerated.  Continue breathing treatment steroids and antibiotics.  Echocardiogram shows EF of 55 to 60% with dilated left  atrium and mildly elevated right ventricular systolic pressure.  Blood cultures are negative x 2 days.  INR still supratherapeutic at 3.6.  Repeat CBC and BMP in AM.    Objective     Last Recorded Vitals  /79 (BP Location: Left arm, Patient Position: Lying) Comment: ABRAM Cece notified  Pulse 60   Temp 36.4 °C (97.5 °F) (Temporal)   Resp 20   Wt 84 kg (185 lb 3 oz)   SpO2 96%   Intake/Output last 3 Shifts:    Intake/Output Summary (Last 24 hours) at 1/6/2025 1452  Last data filed at 1/6/2025 1404  Gross per 24 hour   Intake 120 ml   Output 2075 ml   Net -1955 ml       Admission Weight  Weight: 81.6 kg (180 lb) (01/03/25 1403)    Daily Weight  01/06/25 : 84 kg (185 lb 3 oz)    Image Results  Electrocardiogram, 12-lead PRN ACS symptoms  Ventricular-paced rhythm  Abnormal ECG  When compared with ECG of 03-JAN-2025 18:03,  PREVIOUS ECG IS PRESENT  Electrocardiogram, 12-lead PRN ACS symptoms  Ventricular-paced rhythm  Abnormal ECG  When compared with ECG of 05-JAN-2025 12:13, (unconfirmed)  No significant change was found  Electrocardiogram, 12-lead PRN ACS symptoms  Ventricular-paced rhythm  Abnormal ECG  When compared with ECG of 05-JAN-2025 12:00, (unconfirmed)  No significant change was found  ECG 12 lead  Sinus rhythm  Short NE interval  Probable left atrial enlargement  Right bundle branch block  Left ventricular hypertrophy  Anterolateral infarct, age indeterminate  ECG 12 lead  Sinus rhythm  Short NE interval  IVCD, consider atypical RBBB  Anterolateral infarct, age indeterminate  Baseline wander in lead(s) V4,V6      Physical Exam  Constitutional:       General: She is in acute distress.      Appearance: She is ill-appearing.   HENT:      Head: Normocephalic.      Mouth/Throat:      Mouth: Mucous membranes are moist.      Pharynx: Oropharynx is clear.   Eyes:      Pupils: Pupils are equal, round, and reactive to light.   Cardiovascular:      Rate and Rhythm: Normal rate and regular rhythm.      Heart  sounds: Normal heart sounds. No murmur heard.     No gallop.   Pulmonary:      Effort: Respiratory distress present.      Breath sounds: Wheezing and rales present.   Abdominal:      General: Bowel sounds are normal. There is no distension.      Palpations: Abdomen is soft.      Tenderness: There is no abdominal tenderness.   Musculoskeletal:         General: No swelling. Normal range of motion.      Cervical back: Neck supple. No rigidity.   Skin:     General: Skin is warm and dry.   Neurological:      General: No focal deficit present.      Mental Status: She is alert.      Cranial Nerves: No cranial nerve deficit.      Sensory: No sensory deficit.   Psychiatric:         Mood and Affect: Mood normal.         Behavior: Behavior normal.       Relevant Results             Scheduled medications  amLODIPine, 5 mg, oral, Daily  atorvastatin, 10 mg, oral, Daily  azelastine, 1 spray, Each Nostril, BID  donepezil, 10 mg, oral, Nightly  doxycycline, 100 mg, oral, q12h DAQUAN  fluticasone, 2 spray, Each Nostril, Daily  fluticasone furoate-vilanteroL, 1 puff, inhalation, Daily  furosemide, 40 mg, intravenous, BID  guaiFENesin, 1,200 mg, oral, BID  metOLazone, 2.5 mg, oral, Daily  nystatin, 1 Application, Topical, BID  oxygen, , inhalation, Continuous - Inhalation  pantoprazole, 40 mg, oral, Daily before breakfast   Or  pantoprazole, 40 mg, intravenous, Daily before breakfast  polyethylene glycol, 17 g, oral, Daily  potassium chloride CR, 20 mEq, oral, BID      Continuous medications     PRN medications  PRN medications: acetaminophen, albuterol, alteplase, benzocaine-menthol, bisacodyl, bisacodyl, heparin flush, heparin flush, melatonin, ondansetron **OR** ondansetron  Results for orders placed or performed during the hospital encounter of 01/03/25 (from the past 96 hours)   Sars-CoV-2 and Influenza A/B PCR   Result Value Ref Range    Flu A Result Not Detected Not Detected    Flu B Result Not Detected Not Detected    Coronavirus  2019, PCR Not Detected Not Detected   RSV PCR   Result Value Ref Range    RSV PCR Detected (A) Not Detected   ECG 12 lead   Result Value Ref Range    Ventricular Rate 60 BPM    Atrial Rate 283 BPM    ID Interval 70 ms    QRS Duration 159 ms    QT Interval 484 ms    QTC Calculation(Bazett) 484 ms    P Axis 0 degrees    R Axis -87 degrees    T Axis 78 degrees    QRS Count 9 beats    Q Onset 253 ms    T Offset 495 ms    QTC Fredericia 484 ms   CBC and Auto Differential   Result Value Ref Range    WBC 5.2 4.4 - 11.3 x10*3/uL    nRBC 0.0 0.0 - 0.0 /100 WBCs    RBC 4.09 4.00 - 5.20 x10*6/uL    Hemoglobin 10.8 (L) 12.0 - 16.0 g/dL    Hematocrit 36.0 36.0 - 46.0 %    MCV 88 80 - 100 fL    MCH 26.4 26.0 - 34.0 pg    MCHC 30.0 (L) 32.0 - 36.0 g/dL    RDW 14.6 (H) 11.5 - 14.5 %    Platelets 149 (L) 150 - 450 x10*3/uL    Neutrophils % 79.2 40.0 - 80.0 %    Immature Granulocytes %, Automated 0.4 0.0 - 0.9 %    Lymphocytes % 9.4 13.0 - 44.0 %    Monocytes % 10.4 2.0 - 10.0 %    Eosinophils % 0.2 0.0 - 6.0 %    Basophils % 0.4 0.0 - 2.0 %    Neutrophils Absolute 4.12 1.60 - 5.50 x10*3/uL    Immature Granulocytes Absolute, Automated 0.02 0.00 - 0.50 x10*3/uL    Lymphocytes Absolute 0.49 (L) 0.80 - 3.00 x10*3/uL    Monocytes Absolute 0.54 0.05 - 0.80 x10*3/uL    Eosinophils Absolute 0.01 0.00 - 0.40 x10*3/uL    Basophils Absolute 0.02 0.00 - 0.10 x10*3/uL   Comprehensive metabolic panel   Result Value Ref Range    Glucose 121 (H) 74 - 99 mg/dL    Sodium 138 136 - 145 mmol/L    Potassium 4.3 3.5 - 5.3 mmol/L    Chloride 104 98 - 107 mmol/L    Bicarbonate 28 21 - 32 mmol/L    Anion Gap 10 10 - 20 mmol/L    Urea Nitrogen 17 6 - 23 mg/dL    Creatinine 0.94 0.50 - 1.05 mg/dL    eGFR 61 >60 mL/min/1.73m*2    Calcium 9.1 8.6 - 10.3 mg/dL    Albumin 4.1 3.4 - 5.0 g/dL    Alkaline Phosphatase 68 33 - 136 U/L    Total Protein 6.8 6.4 - 8.2 g/dL    AST 19 9 - 39 U/L    Bilirubin, Total 1.0 0.0 - 1.2 mg/dL    ALT 10 7 - 45 U/L   B-Type  Natriuretic Peptide   Result Value Ref Range     (H) 0 - 99 pg/mL   Blood Gas Venous   Result Value Ref Range    POCT pH, Venous 7.37 7.33 - 7.43 pH    POCT pCO2, Venous 47 41 - 51 mm Hg    POCT pO2, Venous 55 (H) 35 - 45 mm Hg    POCT SO2, Venous 83 (H) 45 - 75 %    POCT Oxy Hemoglobin, Venous 81.6 (H) 45.0 - 75.0 %    POCT Base Excess, Venous 1.3 -2.0 - 3.0 mmol/L    POCT HCO3 Calculated, Venous 27.2 (H) 22.0 - 26.0 mmol/L    Patient Temperature 37.0 degrees Celsius    FiO2 28 %   Troponin I, High Sensitivity, Initial   Result Value Ref Range    Troponin I, High Sensitivity 66 (HH) 0 - 13 ng/L   Blood Culture    Specimen: Peripheral Venipuncture; Blood culture   Result Value Ref Range    Blood Culture No growth at 2 days    Blood Culture    Specimen: Peripheral Venipuncture; Blood culture   Result Value Ref Range    Blood Culture No growth at 2 days    Lactate   Result Value Ref Range    Lactate 1.6 0.4 - 2.0 mmol/L   Troponin, High Sensitivity, 1 Hour   Result Value Ref Range    Troponin I, High Sensitivity 70 (HH) 0 - 13 ng/L   Protime-INR   Result Value Ref Range    Protime 32.4 (H) 9.8 - 12.8 seconds    INR 2.8 (H) 0.9 - 1.1   Troponin I, High Sensitivity   Result Value Ref Range    Troponin I, High Sensitivity 57 (HH) 0 - 13 ng/L   Urinalysis with Reflex Culture and Microscopic   Result Value Ref Range    Color, Urine Yellow Light-Yellow, Yellow, Dark-Yellow    Appearance, Urine Ex.Turbid (N) Clear    Specific Gravity, Urine 1.043 (N) 1.005 - 1.035    pH, Urine 5.5 5.0, 5.5, 6.0, 6.5, 7.0, 7.5, 8.0    Protein, Urine 100 (2+) (A) NEGATIVE, 10 (TRACE), 20 (TRACE) mg/dL    Glucose, Urine Normal Normal mg/dL    Blood, Urine 0.2 (2+) (A) NEGATIVE    Ketones, Urine NEGATIVE NEGATIVE mg/dL    Bilirubin, Urine NEGATIVE NEGATIVE    Urobilinogen, Urine 2 (1+) (A) Normal mg/dL    Nitrite, Urine NEGATIVE NEGATIVE    Leukocyte Esterase, Urine NEGATIVE NEGATIVE   Extra Urine Gray Tube   Result Value Ref Range     Extra Tube Hold for add-ons.    Urinalysis Microscopic   Result Value Ref Range    WBC, Urine NONE 1-5, NONE /HPF    RBC, Urine >20 (A) NONE, 1-2, 3-5 /HPF    Squamous Epithelial Cells, Urine 1-9 (SPARSE) Reference range not established. /HPF    Calcium Oxalate Crystals, Urine 4+ (A) NONE, 1+ /HPF   ECG 12 lead   Result Value Ref Range    Ventricular Rate 60 BPM    Atrial Rate 144 BPM    IL Interval 63 ms    QRS Duration 151 ms    QT Interval 491 ms    QTC Calculation(Bazett) 491 ms    P Axis 0 degrees    R Axis -84 degrees    T Axis 82 degrees    QRS Count 9 beats    Q Onset 252 ms    T Offset 498 ms    QTC Fredericia 491 ms   Troponin I, High Sensitivity   Result Value Ref Range    Troponin I, High Sensitivity 55 (HH) 0 - 13 ng/L   Comprehensive Metabolic Panel   Result Value Ref Range    Glucose      Sodium      Potassium      Chloride      Bicarbonate      Anion Gap      Urea Nitrogen      Creatinine      eGFR      Calcium      Albumin      Alkaline Phosphatase      Total Protein      AST      Bilirubin, Total      ALT     Magnesium   Result Value Ref Range    Magnesium     Protime-INR   Result Value Ref Range    Protime      INR     Staphylococcus Aureus/MRSA Colonization, Culture    Specimen: Anterior Nares; Swab   Result Value Ref Range    Staph/MRSA Screen Culture No Staphylococcus aureus isolated    CBC and Auto Differential   Result Value Ref Range    WBC 5.8 4.4 - 11.3 x10*3/uL    nRBC 0.0 0.0 - 0.0 /100 WBCs    RBC 3.83 (L) 4.00 - 5.20 x10*6/uL    Hemoglobin 9.9 (L) 12.0 - 16.0 g/dL    Hematocrit 33.8 (L) 36.0 - 46.0 %    MCV 88 80 - 100 fL    MCH 25.8 (L) 26.0 - 34.0 pg    MCHC 29.3 (L) 32.0 - 36.0 g/dL    RDW 14.7 (H) 11.5 - 14.5 %    Platelets 139 (L) 150 - 450 x10*3/uL    Neutrophils % 81.8 40.0 - 80.0 %    Immature Granulocytes %, Automated 0.3 0.0 - 0.9 %    Lymphocytes % 7.1 13.0 - 44.0 %    Monocytes % 8.7 2.0 - 10.0 %    Eosinophils % 1.4 0.0 - 6.0 %    Basophils % 0.7 0.0 - 2.0 %    Neutrophils  Absolute 4.71 1.60 - 5.50 x10*3/uL    Immature Granulocytes Absolute, Automated 0.02 0.00 - 0.50 x10*3/uL    Lymphocytes Absolute 0.41 (L) 0.80 - 3.00 x10*3/uL    Monocytes Absolute 0.50 0.05 - 0.80 x10*3/uL    Eosinophils Absolute 0.08 0.00 - 0.40 x10*3/uL    Basophils Absolute 0.04 0.00 - 0.10 x10*3/uL   Comprehensive metabolic panel   Result Value Ref Range    Glucose 103 (H) 74 - 99 mg/dL    Sodium 140 136 - 145 mmol/L    Potassium 4.2 3.5 - 5.3 mmol/L    Chloride 100 98 - 107 mmol/L    Bicarbonate 32 21 - 32 mmol/L    Anion Gap 12 10 - 20 mmol/L    Urea Nitrogen 16 6 - 23 mg/dL    Creatinine 1.04 0.50 - 1.05 mg/dL    eGFR 54 (L) >60 mL/min/1.73m*2    Calcium 8.9 8.6 - 10.3 mg/dL    Albumin 4.1 3.4 - 5.0 g/dL    Alkaline Phosphatase 66 33 - 136 U/L    Total Protein 7.1 6.4 - 8.2 g/dL    AST 31 9 - 39 U/L    Bilirubin, Total 1.1 0.0 - 1.2 mg/dL    ALT 11 7 - 45 U/L   Magnesium   Result Value Ref Range    Magnesium 2.09 1.60 - 2.40 mg/dL   Protime-INR   Result Value Ref Range    Protime 31.2 (H) 9.8 - 12.8 seconds    INR 2.7 (H) 0.9 - 1.1   Transthoracic Echo (TTE) Complete   Result Value Ref Range    LVOT diam 2.00 cm    LV Biplane EF 58 %    MV E/A ratio 2.01     Tricuspid annular plane systolic excursion 2.4 cm    AV mn grad 5 mmHg    LA vol index A/L 48.7 ml/m2    AV pk song 1.43 m/s    LV EF 58 %    RV free wall pk S' 11.80 cm/s    RVSP 45.0 mmHg    LVIDd 5.22 cm    Aortic Valve Area by Continuity of VTI 1.77 cm2    Aortic Valve Area by Continuity of Peak Velocity 1.87 cm2    AV pk grad 8 mmHg    LV A4C EF 57.7    Protime-INR   Result Value Ref Range    Protime 39.1 (H) 9.8 - 12.8 seconds    INR 3.4 (H) 0.9 - 1.1   Basic Metabolic Panel   Result Value Ref Range    Glucose 106 (H) 74 - 99 mg/dL    Sodium 140 136 - 145 mmol/L    Potassium 3.0 (L) 3.5 - 5.3 mmol/L    Chloride 103 98 - 107 mmol/L    Bicarbonate 30 21 - 32 mmol/L    Anion Gap 10 10 - 20 mmol/L    Urea Nitrogen 14 6 - 23 mg/dL    Creatinine 0.79  0.50 - 1.05 mg/dL    eGFR 76 >60 mL/min/1.73m*2    Calcium 7.4 (L) 8.6 - 10.3 mg/dL   CBC   Result Value Ref Range    WBC 6.5 4.4 - 11.3 x10*3/uL    nRBC 0.0 0.0 - 0.0 /100 WBCs    RBC 3.89 (L) 4.00 - 5.20 x10*6/uL    Hemoglobin 10.5 (L) 12.0 - 16.0 g/dL    Hematocrit 34.6 (L) 36.0 - 46.0 %    MCV 89 80 - 100 fL    MCH 27.0 26.0 - 34.0 pg    MCHC 30.3 (L) 32.0 - 36.0 g/dL    RDW 14.6 (H) 11.5 - 14.5 %    Platelets 141 (L) 150 - 450 x10*3/uL   Electrocardiogram, 12-lead PRN ACS symptoms   Result Value Ref Range    Ventricular Rate 60 BPM    Atrial Rate 288 BPM    QRS Duration 138 ms    QT Interval 480 ms    QTC Calculation(Bazett) 480 ms    R Axis -87 degrees    T Axis 94 degrees    QRS Count 10 beats    Q Onset 197 ms    T Offset 437 ms    QTC Fredericia 480 ms   Electrocardiogram, 12-lead PRN ACS symptoms   Result Value Ref Range    Ventricular Rate 60 BPM    Atrial Rate 288 BPM    QRS Duration 140 ms    QT Interval 494 ms    QTC Calculation(Bazett) 494 ms    R Axis -82 degrees    T Axis 79 degrees    QRS Count 10 beats    Q Onset 193 ms    T Offset 440 ms    QTC Fredericia 494 ms   Electrocardiogram, 12-lead PRN ACS symptoms   Result Value Ref Range    Ventricular Rate 60 BPM    Atrial Rate 288 BPM    QRS Duration 134 ms    QT Interval 484 ms    QTC Calculation(Bazett) 484 ms    R Axis -84 degrees    T Axis 86 degrees    QRS Count 10 beats    Q Onset 195 ms    T Offset 437 ms    QTC Fredericia 484 ms   Protime-INR   Result Value Ref Range    Protime 41.0 (H) 9.8 - 12.8 seconds    INR 3.6 (H) 0.9 - 1.1   Basic Metabolic Panel   Result Value Ref Range    Glucose 83 74 - 99 mg/dL    Sodium 139 136 - 145 mmol/L    Potassium 3.8 3.5 - 5.3 mmol/L    Chloride 101 98 - 107 mmol/L    Bicarbonate 34 (H) 21 - 32 mmol/L    Anion Gap 8 (L) 10 - 20 mmol/L    Urea Nitrogen 13 6 - 23 mg/dL    Creatinine 0.78 0.50 - 1.05 mg/dL    eGFR 77 >60 mL/min/1.73m*2    Calcium 8.5 (L) 8.6 - 10.3 mg/dL   Magnesium   Result Value Ref Range     Magnesium 2.03 1.60 - 2.40 mg/dL       Assessment/Plan   This patient currently has cardiac telemetry ordered; if you would like to modify or discontinue the telemetry order, click here to go to the orders activity to modify/discontinue the order.              Assessment & Plan  Acute hypoxic respiratory failure (Multi)      1. Acute hypoxic respiratory failure (Multi)      continue oxygen therapy, wean as possible, manage underlying CHF and RSV infection      2. RSV bronchiolitis      supportive care, steroids and bronchodilator. add empirical Doxycycline for bacterial infection and add Breo ellipta for wheezing.      3. Acute on chronic congestive heart failure, unspecified heart failure type  Transthoracic Echo (TTE) Complete    Transthoracic Echo (TTE) Complete    continue Lasix and Zaroxolyn      4. Elevated troponin  Transthoracic Echo (TTE) Complete    Transthoracic Echo (TTE) Complete    partially 2/2 pulmonary conditions, no evidence of ACS      5. ALONSO (dyspnea on exertion)  Transthoracic Echo (TTE) Complete    Transthoracic Echo (TTE) Complete      6. Primary hypertension      BP stable. add norvasc 5 mg for better control      7. Obesity, Class I, BMI 30-34.9        8. Permanent atrial fibrillation (Multi)      HR 60 s/p pacemaker      9. Sick sinus syndrome (Multi)      s/p pacemaker, HR 60, no beta blocker per outpatient management                      Richie Cassidy MD

## 2025-01-07 LAB
ANION GAP SERPL CALC-SCNC: 12 MMOL/L (ref 10–20)
BACTERIA BLD CULT: NORMAL
BACTERIA BLD CULT: NORMAL
BASOPHILS # BLD AUTO: 0.03 X10*3/UL (ref 0–0.1)
BASOPHILS NFR BLD AUTO: 0.5 %
BUN SERPL-MCNC: 20 MG/DL (ref 6–23)
CALCIUM SERPL-MCNC: 9.5 MG/DL (ref 8.6–10.3)
CHLORIDE SERPL-SCNC: 93 MMOL/L (ref 98–107)
CO2 SERPL-SCNC: 37 MMOL/L (ref 21–32)
CREAT SERPL-MCNC: 0.9 MG/DL (ref 0.5–1.05)
EGFRCR SERPLBLD CKD-EPI 2021: 65 ML/MIN/1.73M*2
EOSINOPHIL # BLD AUTO: 0.27 X10*3/UL (ref 0–0.4)
EOSINOPHIL NFR BLD AUTO: 4.4 %
ERYTHROCYTE [DISTWIDTH] IN BLOOD BY AUTOMATED COUNT: 14.5 % (ref 11.5–14.5)
GLUCOSE SERPL-MCNC: 104 MG/DL (ref 74–99)
HCT VFR BLD AUTO: 40.1 % (ref 36–46)
HGB BLD-MCNC: 11.9 G/DL (ref 12–16)
IMM GRANULOCYTES # BLD AUTO: 0.01 X10*3/UL (ref 0–0.5)
IMM GRANULOCYTES NFR BLD AUTO: 0.2 % (ref 0–0.9)
INR PPP: 2.5 (ref 0.9–1.1)
LYMPHOCYTES # BLD AUTO: 0.76 X10*3/UL (ref 0.8–3)
LYMPHOCYTES NFR BLD AUTO: 12.4 %
MCH RBC QN AUTO: 25.4 PG (ref 26–34)
MCHC RBC AUTO-ENTMCNC: 29.7 G/DL (ref 32–36)
MCV RBC AUTO: 86 FL (ref 80–100)
MONOCYTES # BLD AUTO: 0.63 X10*3/UL (ref 0.05–0.8)
MONOCYTES NFR BLD AUTO: 10.3 %
NEUTROPHILS # BLD AUTO: 4.42 X10*3/UL (ref 1.6–5.5)
NEUTROPHILS NFR BLD AUTO: 72.2 %
NRBC BLD-RTO: 0 /100 WBCS (ref 0–0)
PLATELET # BLD AUTO: 189 X10*3/UL (ref 150–450)
POTASSIUM SERPL-SCNC: 3.6 MMOL/L (ref 3.5–5.3)
PROTHROMBIN TIME: 28.7 SECONDS (ref 9.8–12.8)
RBC # BLD AUTO: 4.68 X10*6/UL (ref 4–5.2)
SODIUM SERPL-SCNC: 138 MMOL/L (ref 136–145)
WBC # BLD AUTO: 6.1 X10*3/UL (ref 4.4–11.3)

## 2025-01-07 PROCEDURE — 99232 SBSQ HOSP IP/OBS MODERATE 35: CPT | Performed by: NURSE PRACTITIONER

## 2025-01-07 PROCEDURE — 99233 SBSQ HOSP IP/OBS HIGH 50: CPT | Performed by: INTERNAL MEDICINE

## 2025-01-07 PROCEDURE — 2500000004 HC RX 250 GENERAL PHARMACY W/ HCPCS (ALT 636 FOR OP/ED): Performed by: INTERNAL MEDICINE

## 2025-01-07 PROCEDURE — 97530 THERAPEUTIC ACTIVITIES: CPT | Mod: CQ,GP

## 2025-01-07 PROCEDURE — 36415 COLL VENOUS BLD VENIPUNCTURE: CPT | Performed by: INTERNAL MEDICINE

## 2025-01-07 PROCEDURE — 2500000002 HC RX 250 W HCPCS SELF ADMINISTERED DRUGS (ALT 637 FOR MEDICARE OP, ALT 636 FOR OP/ED): Performed by: NURSE PRACTITIONER

## 2025-01-07 PROCEDURE — 2500000004 HC RX 250 GENERAL PHARMACY W/ HCPCS (ALT 636 FOR OP/ED): Performed by: STUDENT IN AN ORGANIZED HEALTH CARE EDUCATION/TRAINING PROGRAM

## 2025-01-07 PROCEDURE — 2500000001 HC RX 250 WO HCPCS SELF ADMINISTERED DRUGS (ALT 637 FOR MEDICARE OP): Performed by: STUDENT IN AN ORGANIZED HEALTH CARE EDUCATION/TRAINING PROGRAM

## 2025-01-07 PROCEDURE — 80048 BASIC METABOLIC PNL TOTAL CA: CPT | Performed by: INTERNAL MEDICINE

## 2025-01-07 PROCEDURE — 92526 ORAL FUNCTION THERAPY: CPT | Mod: GN

## 2025-01-07 PROCEDURE — 2500000002 HC RX 250 W HCPCS SELF ADMINISTERED DRUGS (ALT 637 FOR MEDICARE OP, ALT 636 FOR OP/ED)

## 2025-01-07 PROCEDURE — 2500000005 HC RX 250 GENERAL PHARMACY W/O HCPCS: Performed by: STUDENT IN AN ORGANIZED HEALTH CARE EDUCATION/TRAINING PROGRAM

## 2025-01-07 PROCEDURE — 2500000001 HC RX 250 WO HCPCS SELF ADMINISTERED DRUGS (ALT 637 FOR MEDICARE OP): Performed by: INTERNAL MEDICINE

## 2025-01-07 PROCEDURE — 85610 PROTHROMBIN TIME: CPT | Performed by: STUDENT IN AN ORGANIZED HEALTH CARE EDUCATION/TRAINING PROGRAM

## 2025-01-07 PROCEDURE — 2060000001 HC INTERMEDIATE ICU ROOM DAILY

## 2025-01-07 PROCEDURE — 97530 THERAPEUTIC ACTIVITIES: CPT | Mod: GO,CO

## 2025-01-07 PROCEDURE — 85025 COMPLETE CBC W/AUTO DIFF WBC: CPT | Performed by: INTERNAL MEDICINE

## 2025-01-07 PROCEDURE — 97110 THERAPEUTIC EXERCISES: CPT | Mod: GO,CO

## 2025-01-07 RX ORDER — IPRATROPIUM BROMIDE AND ALBUTEROL SULFATE 2.5; .5 MG/3ML; MG/3ML
3 SOLUTION RESPIRATORY (INHALATION) EVERY 2 HOUR PRN
Status: DISCONTINUED | OUTPATIENT
Start: 2025-01-07 | End: 2025-01-11 | Stop reason: HOSPADM

## 2025-01-07 RX ORDER — WARFARIN 1 MG/1
1 TABLET ORAL ONCE
Status: COMPLETED | OUTPATIENT
Start: 2025-01-07 | End: 2025-01-07

## 2025-01-07 RX ADMIN — WARFARIN SODIUM 1 MG: 1 TABLET ORAL at 17:05

## 2025-01-07 RX ADMIN — FUROSEMIDE 40 MG: 10 INJECTION, SOLUTION INTRAMUSCULAR; INTRAVENOUS at 15:15

## 2025-01-07 RX ADMIN — ATORVASTATIN CALCIUM 10 MG: 10 TABLET, FILM COATED ORAL at 10:41

## 2025-01-07 RX ADMIN — DOXYCYCLINE 100 MG: 100 CAPSULE ORAL at 21:37

## 2025-01-07 RX ADMIN — AMLODIPINE BESYLATE 5 MG: 5 TABLET ORAL at 10:41

## 2025-01-07 RX ADMIN — GUAIFENESIN 1200 MG: 600 TABLET ORAL at 10:41

## 2025-01-07 RX ADMIN — POLYETHYLENE GLYCOL 3350 17 G: 17 POWDER, FOR SOLUTION ORAL at 10:47

## 2025-01-07 RX ADMIN — DONEPEZIL HYDROCHLORIDE 10 MG: 5 TABLET ORAL at 21:37

## 2025-01-07 RX ADMIN — NYSTATIN 1 APPLICATION: 100000 POWDER TOPICAL at 21:43

## 2025-01-07 RX ADMIN — FLUTICASONE FUROATE AND VILANTEROL TRIFENATATE 1 PUFF: 200; 25 POWDER RESPIRATORY (INHALATION) at 15:17

## 2025-01-07 RX ADMIN — PANTOPRAZOLE SODIUM 40 MG: 40 TABLET, DELAYED RELEASE ORAL at 05:51

## 2025-01-07 RX ADMIN — Medication 3 L/MIN: at 19:44

## 2025-01-07 RX ADMIN — POTASSIUM CHLORIDE 20 MEQ: 1500 TABLET, EXTENDED RELEASE ORAL at 10:47

## 2025-01-07 RX ADMIN — ACETAZOLAMIDE 500 MG: 500 INJECTION, POWDER, LYOPHILIZED, FOR SOLUTION INTRAVENOUS at 17:06

## 2025-01-07 RX ADMIN — DOXYCYCLINE 100 MG: 100 CAPSULE ORAL at 10:41

## 2025-01-07 RX ADMIN — NYSTATIN 1 APPLICATION: 100000 POWDER TOPICAL at 11:09

## 2025-01-07 RX ADMIN — GUAIFENESIN 1200 MG: 600 TABLET ORAL at 21:37

## 2025-01-07 RX ADMIN — POTASSIUM CHLORIDE 20 MEQ: 1500 TABLET, EXTENDED RELEASE ORAL at 21:38

## 2025-01-07 RX ADMIN — Medication 3 L/MIN: at 11:10

## 2025-01-07 RX ADMIN — METOLAZONE 2.5 MG: 5 TABLET ORAL at 10:42

## 2025-01-07 RX ADMIN — BISACODYL 10 MG: 5 TABLET, COATED ORAL at 10:41

## 2025-01-07 RX ADMIN — FUROSEMIDE 40 MG: 10 INJECTION, SOLUTION INTRAMUSCULAR; INTRAVENOUS at 11:08

## 2025-01-07 ASSESSMENT — PAIN SCALES - GENERAL
PAINLEVEL_OUTOF10: 0 - NO PAIN

## 2025-01-07 ASSESSMENT — COGNITIVE AND FUNCTIONAL STATUS - GENERAL
WALKING IN HOSPITAL ROOM: A LOT
EATING MEALS: A LITTLE
MOVING TO AND FROM BED TO CHAIR: A LITTLE
TURNING FROM BACK TO SIDE WHILE IN FLAT BAD: A LOT
DAILY ACTIVITIY SCORE: 13
DRESSING REGULAR UPPER BODY CLOTHING: A LOT
TOILETING: A LOT
WALKING IN HOSPITAL ROOM: A LOT
CLIMB 3 TO 5 STEPS WITH RAILING: TOTAL
MOVING TO AND FROM BED TO CHAIR: A LOT
MOVING FROM LYING ON BACK TO SITTING ON SIDE OF FLAT BED WITH BEDRAILS: A LOT
MOBILITY SCORE: 11
DRESSING REGULAR UPPER BODY CLOTHING: A LOT
PERSONAL GROOMING: A LOT
DAILY ACTIVITIY SCORE: 9
TURNING FROM BACK TO SIDE WHILE IN FLAT BAD: A LOT
MOVING FROM LYING ON BACK TO SITTING ON SIDE OF FLAT BED WITH BEDRAILS: A LOT
DRESSING REGULAR LOWER BODY CLOTHING: A LOT
STANDING UP FROM CHAIR USING ARMS: A LOT
EATING MEALS: A LOT
DRESSING REGULAR LOWER BODY CLOTHING: TOTAL
TOILETING: TOTAL
PERSONAL GROOMING: A LOT
HELP NEEDED FOR BATHING: A LOT
MOBILITY SCORE: 12
STANDING UP FROM CHAIR USING ARMS: A LOT
CLIMB 3 TO 5 STEPS WITH RAILING: TOTAL
HELP NEEDED FOR BATHING: TOTAL

## 2025-01-07 ASSESSMENT — PAIN - FUNCTIONAL ASSESSMENT
PAIN_FUNCTIONAL_ASSESSMENT: 0-10
PAIN_FUNCTIONAL_ASSESSMENT: UNABLE TO SELF-REPORT
PAIN_FUNCTIONAL_ASSESSMENT: 0-10
PAIN_FUNCTIONAL_ASSESSMENT: 0-10

## 2025-01-07 NOTE — PROGRESS NOTES
Speech-Language Pathology    [facility] Adult Inpatient  Speech-Language Pathology Treatment     Patient Name: Bethany Espinoza  MRN: [id number]  Today's Date: 1/7/2025  Time Calculation  Start Time: 1450  Stop Time: 1509  Time Calculation (min): 19 min         Current Problem:   1. Acute hypoxic respiratory failure (Multi)      continue oxygen therapy, wean as possible, manage underlying CHF and RSV infection      2. RSV bronchiolitis      supportive care, steroids and bronchodilator. add empirical Doxycycline for bacterial infection and add Breo ellipta for wheezing.      3. Acute on chronic congestive heart failure, unspecified heart failure type  Transthoracic Echo (TTE) Complete    Transthoracic Echo (TTE) Complete    continue Lasix and Zaroxolyn      4. Elevated troponin  Transthoracic Echo (TTE) Complete    Transthoracic Echo (TTE) Complete    partially 2/2 pulmonary conditions, no evidence of ACS      5. ALONSO (dyspnea on exertion)  Transthoracic Echo (TTE) Complete    Transthoracic Echo (TTE) Complete      6. Primary hypertension      BP stable. add norvasc 5 mg for better control      7. Obesity, Class I, BMI 30-34.9        8. Permanent atrial fibrillation (Multi)      HR 60 s/p pacemaker      9. Sick sinus syndrome (Multi)      s/p pacemaker, HR 60, no beta blocker per outpatient management      General Visit Information:   Patient Seen During This Visit: Yes      Pain Assessment:  Pain Assessment  Pain Assessment: 0-10      Objective   Patient seen for follow up dysphagia therapy. Patient with frequent non productive cough this date. When coughing would subside, patient was provided with thin liquids. SLP reviewed swallow strategies. Patient able to independently list up at 90 degrees, single small bites and sips, slow rate and two swallows all bites and sips.    Patient used strategies with 80% accuracy independently. She required verbal cues to complete dry swallows.   No overt signs of penetration or aspiration  with thin liquids.    Dysphagia Goals:   Patient will tolerate recommended diet without observed clinical signs of aspiration  Patient will demonstrate appropriate strategies for swallowing safety    Status: Progressing

## 2025-01-07 NOTE — CARE PLAN
Problem: Skin  Goal: Prevent/manage excess moisture  Outcome: Progressing  Goal: Promote/optimize nutrition  Outcome: Progressing     Problem: Safety - Adult  Goal: Free from fall injury  Outcome: Progressing     Problem: Respiratory  Goal: No signs of respiratory distress (eg. Use of accessory muscles. Peds grunting)  Outcome: Progressing   The patient's goals for the shift include Pt. will have a safe, restful and uneventful evening    The clinical goals for the shift include SPO2>90% throughout shift

## 2025-01-07 NOTE — CARE PLAN
Problem: Skin  Goal: Decreased wound size/increased tissue granulation at next dressing change  1/7/2025 1820 by Sarahy Adan RN  Flowsheets (Taken 1/7/2025 0132 by Alyson Watson, RN)  Decreased wound size/increased tissue granulation at next dressing change: Promote sleep for wound healing  1/7/2025 1819 by Sarahy Adan RN  Flowsheets (Taken 1/7/2025 0132 by Alyson Watson, RN)  Decreased wound size/increased tissue granulation at next dressing change: Promote sleep for wound healing  Goal: Participates in plan/prevention/treatment measures  1/7/2025 1820 by Sarahy Adan RN  Flowsheets (Taken 1/7/2025 0132 by Alyson Watson RN)  Participates in plan/prevention/treatment measures:   Increase activity/out of bed for meals   Discuss with provider PT/OT consult  1/7/2025 1819 by Sarahy Adan RN  Outcome: Progressing  Goal: Prevent/manage excess moisture  1/7/2025 1820 by Sarahy Adan RN  Flowsheets (Taken 1/7/2025 0132 by Alyson Watson RN)  Prevent/manage excess moisture: Cleanse incontinence/protect with barrier cream  1/7/2025 1819 by Sarahy Adan RN  Outcome: Progressing  1/7/2025 1650 by Sarahy Adan RN  Outcome: Progressing  Goal: Prevent/minimize sheer/friction injuries  Flowsheets (Taken 1/7/2025 0132 by Alyson Watson, ABRAM)  Prevent/minimize sheer/friction injuries: Turn/reposition every 2 hours/use positioning/transfer devices  Goal: Promote/optimize nutrition  1/7/2025 1820 by Sarahy Adan RN  Flowsheets (Taken 1/7/2025 1820)  Promote/optimize nutrition: Assist with feeding  1/7/2025 1650 by Sarahy Adan RN  Outcome: Progressing  Goal: Promote skin healing  Flowsheets (Taken 1/7/2025 0132 by Alyson Watson, ABRAM)  Promote skin healing: Turn/reposition every 2 hours/use positioning/transfer devices     Problem: Skin  Goal: Decreased wound size/increased tissue granulation at next dressing change  1/7/2025 1820 by Sarahy Adan  RN  Flowsheets (Taken 1/7/2025 0132 by Alyson Watson RN)  Decreased wound size/increased tissue granulation at next dressing change: Promote sleep for wound healing  1/7/2025 1819 by Sarahy Adan RN  Flowsheets (Taken 1/7/2025 0132 by Alyson Watson RN)  Decreased wound size/increased tissue granulation at next dressing change: Promote sleep for wound healing  Goal: Participates in plan/prevention/treatment measures  1/7/2025 1820 by Sarahy Adan RN  Flowsheets (Taken 1/7/2025 0132 by Alyson Watson RN)  Participates in plan/prevention/treatment measures:   Increase activity/out of bed for meals   Discuss with provider PT/OT consult  1/7/2025 1819 by Sarahy Adan RN  Outcome: Progressing  Goal: Prevent/manage excess moisture  1/7/2025 1820 by Sarahy Adan RN  Flowsheets (Taken 1/7/2025 0132 by Alyson Watson RN)  Prevent/manage excess moisture: Cleanse incontinence/protect with barrier cream  1/7/2025 1819 by Sarahy Adan RN  Outcome: Progressing  1/7/2025 1650 by Sarahy Adan RN  Outcome: Progressing  Goal: Prevent/minimize sheer/friction injuries  Flowsheets (Taken 1/7/2025 0132 by Alyson Watson RN)  Prevent/minimize sheer/friction injuries: Turn/reposition every 2 hours/use positioning/transfer devices  Goal: Promote/optimize nutrition  1/7/2025 1820 by Sarahy Adan RN  Flowsheets (Taken 1/7/2025 1820)  Promote/optimize nutrition: Assist with feeding  1/7/2025 1650 by Sarahy Adan RN  Outcome: Progressing  Goal: Promote skin healing  Flowsheets (Taken 1/7/2025 0132 by Alyson Watson RN)  Promote skin healing: Turn/reposition every 2 hours/use positioning/transfer devices     Problem: Safety - Adult  Goal: Free from fall injury  Outcome: Progressing     Problem: Respiratory  Goal: No signs of respiratory distress (eg. Use of accessory muscles. Peds grunting)  Outcome: Progressing   The patient's goals for the shift include Pt. will have a safe, restful  and uneventful evening    The clinical goals for the shift include Patients SPO2 will remain >90 % this shift

## 2025-01-07 NOTE — PROGRESS NOTES
Pharmacy Consult for Warfarin (Coumadin) Management - Daily Progress Note     Bethany Espinoza is a 79 y.o. female admitted for Acute hypoxic respiratory failure (Multi). Pharmacy was consulted for warfarin dosing and monitoring.    Labs  INR   Date Value Ref Range Status   01/07/2025 2.5 (H) 0.9 - 1.1 Final   01/06/2025 3.6 (H) 0.9 - 1.1 Final   01/05/2025 3.4 (H) 0.9 - 1.1 Final     Review  Warfarin Indication: Atrial fibrillation or flutter  Target INR: 2 - 3     Home regimen 2.5mg daily    INR today 2.5 after a 2 day hold. She is on doxycycline. Hgb stable. LFT's were WNL. Given her INR;s were elevated due to not eating super well in the hospital and being on the antibiotic, we will give a reduced dose tonight of 1mg. Recheck INR tomorrow.      Orders placed per pharmacy consult. Pharmacy will continue to monitor and adjust therapy as needed.     Faye Burgess, PharmD

## 2025-01-07 NOTE — PROGRESS NOTES
History Of Present Illness:    Bethany Espinoza is a 79 y.o. female with PMHx s/f HTN, HLD, CAD, permanent atrial fibrillation on warfarin, SSS s/p PPM, chronic diastolic heart failure, mild pHTN, MS with MR, PVD, progressive supranuclear palsy (predominantly immobile; also has issues with dysphagia), obesity, presenting with SOB, cough, congestion, and worsening weakness. In short, pt was in her typical state of health until Monday (12/30/24) when she became congested and more short of breath than usual.  Patient and family was concerned this may have been viral or sinus infection as her grandson had visited and was just getting over RSV. Patient progressively worsened and patient came to hospital.  She saw the PCP in the interim and was prescribed with antibiotic.  Subsequently patient continued to deteriorate with worsening shortness of breath and eventually came to hospital.  She is compliant with her medical therapy.  She does not recall all of her medications though.        Outside cardiology records were reviewed.  Mrs Espinoza has a history of sick sinus syndrome and underwent a dual-chamber pacemaker implantation in 2009. At some point since her pacer implantation, she developed right ventricular pacemaker lead malfunction and this required lead replacement in February 2011. Around the same time, she became symptomatic with atrial fibrillation and required sotalol. She was later switched to Dofetilide. She developed of lead infection in 2011 and a pacemaker was removed. Subsequently, in December 2015, she underwent a single-chamber pacemaker placement for suspicion of bradycardia-induced symptoms of congestive heart failure.   In October 2018, she underwent coronary angiography which did not reveal significant coronary artery disease, but she had moderate to moderately severe mitral regurgitation. She subsequently underwent a transesophageal echocardiogram by me in November 2018, and this revealed that she  only had mild mitral regurgitation (Vena contract a measured 3 mm, the regurgitant orifice area by the proximal isovelocity hemispheric surface area method was 0.17 cm2).  She had a bout of congestive heart failure exacerbation in May 2020. Apparently she was prescribed metolazone for 3 days by her primary care provider. She said that she lost 11 pounds of water weight after she did that. She was also prescribed potassium with the Lasix. Her echocardiogram in 9/21 revealed an LV ejection fraction of 55%, normal right ventricular systolic function, with a dilated right ventricle. She has severe biatrial enlargement, mild mitral, mild to moderate tricuspid regurgitation.    She is currently taking Lasix 20 mg every day, along with metolazone only as necessary on Mondays Wednesdays and Fridays, if she has a weight gain of greater than 3 pounds in a day.     Subjective Data:  Patient denies chest pain she continues to be short of breath.  No pitting edema.  Asked x-ray did show pulmonary vascular congestion.  1-6-25: Remains with dyspnea and very wheezy today.  No CP/pressure. Monitor: Afib with V pacing.  K 3.58, creatinine 0.78, INR 3.6. EF 55-60%  1/7/25: Seen with daughter at bedside. Continues to have ALONSO such as turning/repositioning in bed. On 4L NC and she did not wear oxygen prior to admission.  Atrial flutter/Vpaced on telemetry with HR 60 bpm.    Overnight Events:    None     Objective Data:  Last Recorded Vitals:  Vitals:    01/06/25 2059 01/07/25 0005 01/07/25 0546 01/07/25 0759   BP: 119/80 125/73 118/73 124/78   BP Location: Left arm Left arm Left arm Left arm   Patient Position: Lying Lying Lying Lying   Pulse: 60 58 60 61   Resp: 20 18 18 18   Temp: 36.6 °C (97.9 °F) 36.4 °C (97.6 °F) 36.1 °C (96.9 °F) 36.1 °C (97 °F)   TempSrc: Temporal Temporal Temporal Temporal   SpO2: 95% 95% 94% 92%   Weight:   80.1 kg (176 lb 9.4 oz)    Height:           Last Labs:      Results from last 7 days   Lab Units  01/07/25 0412 01/06/25  0519 01/05/25  0428   SODIUM mmol/L 138 139 140   POTASSIUM mmol/L 3.6 3.8 3.0*   CHLORIDE mmol/L 93* 101 103   CO2 mmol/L 37* 34* 30   BUN mg/dL 20 13 14   CREATININE mg/dL 0.90 0.78 0.79   GLUCOSE mg/dL 104* 83 106*   CALCIUM mg/dL 9.5 8.5* 7.4*      Results from last 7 days   Lab Units 01/07/25  0412 01/05/25  0428 01/04/25  0613   WBC AUTO x10*3/uL 6.1 6.5 5.8   HEMOGLOBIN g/dL 11.9* 10.5* 9.9*   HEMATOCRIT % 40.1 34.6* 33.8*   PLATELETS AUTO x10*3/uL 189 141* 139*      Results from last 7 days   Lab Units 01/06/25  0519   MAGNESIUM mg/dL 2.03      TROPHS   Date/Time Value Ref Range Status   01/03/2025 07:42 PM 55 0 - 13 ng/L Final     Comment:     Previous result verified on 1/3/2025 1516 on specimen/case [id number] called with component MeepsHS for procedure Troponin I, High Sensitivity, Initial with value 66 ng/L.   01/03/2025 05:43 PM 57 0 - 13 ng/L Final     Comment:     Previous result verified on 1/3/2025 1516 on specimen/case [id number] called with component MeepsHS for procedure Troponin I, High Sensitivity, Initial with value 66 ng/L.   01/03/2025 03:23 PM 70 0 - 13 ng/L Final     Comment:     Previous result verified on 1/3/2025 1516 on specimen/case [id number] called with component TRPHS for procedure Troponin I, High Sensitivity, Initial with value 66 ng/L.     BNP   Date/Time Value Ref Range Status   01/03/2025 02:36  0 - 99 pg/mL Final   10/17/2023 11:22  0 - 99 pg/mL Final      Last I/O:  I/O last 3 completed shifts:  In: 120 (1.5 mL/kg) [P.O.:120]  Out: 4125 (51.5 mL/kg) [Urine:4125 (1.4 mL/kg/hr)]  Weight: 80.1 kg     Past Cardiology Tests (Last 3 Years):    Echo:  Transthoracic Echo (TTE) Complete 01/04/2025  PHYSICIAN INTERPRETATION:  Left Ventricle: The left ventricular systolic function is normal, with a visually estimated ejection fraction of 55-60%. There are no regional wall motion abnormalities. The left ventricular cavity size is upper  limits of normal. There is normal septal and normal posterior left ventricular wall thickness. Left ventricular diastolic filling was indeterminate.  Left Atrium: The left atrium is moderately dilated. A bubble study using agitated saline was performed. Bubble study is negative.  Right Ventricle: The right ventricle is moderately enlarged. There is mildly reduced right ventricular systolic function. A device is visualized in the right ventricle.  Right Atrium: The right atrium is mildly dilated. There is a device visualized in the right atrium.  Aortic Valve: The aortic valve is trileaflet. There is mild aortic valve cusp calcification. The aortic valve dimensionless index is 0.56. There is no evidence of aortic valve regurgitation. The peak instantaneous gradient of the aortic valve is 8 mmHg. The mean gradient of the aortic valve is 5 mmHg.  Mitral Valve: The mitral valve is normal in structure. There is mild mitral annular calcification. There is trace mitral valve regurgitation.  Tricuspid Valve: The tricuspid valve is structurally normal. There is moderate tricuspid regurgitation. The Doppler estimated RVSP is mildly elevated right ventricular systolic pressure at 45.0 mmHg.  Pulmonic Valve: The pulmonic valve is structurally normal. There is physiologic pulmonic valve regurgitation.  Pericardium: Trivial pericardial effusion.  Aorta: The aortic root is normal. The ascending aorta was not well visualized.  Systemic Veins: The inferior vena cava appears normal in size, with IVC inspiratory collapse greater than 50%.  Ejection Fractions:  EF   Date/Time Value Ref Range Status   01/04/2025 02:34 PM 58 %          Inpatient Medications:  Scheduled medications   Medication Dose Route Frequency    amLODIPine  5 mg oral Daily    atorvastatin  10 mg oral Daily    azelastine  1 spray Each Nostril BID    donepezil  10 mg oral Nightly    doxycycline  100 mg oral q12h DAQUAN    fluticasone  2 spray Each Nostril Daily     fluticasone furoate-vilanteroL  1 puff inhalation Daily    furosemide  40 mg intravenous BID    guaiFENesin  1,200 mg oral BID    metOLazone  2.5 mg oral Daily    nystatin  1 Application Topical BID    oxygen   inhalation Continuous - Inhalation    pantoprazole  40 mg oral Daily before breakfast    Or    pantoprazole  40 mg intravenous Daily before breakfast    polyethylene glycol  17 g oral Daily    potassium chloride CR  20 mEq oral BID     PRN medications   Medication    acetaminophen    albuterol    alteplase    benzocaine-menthol    bisacodyl    bisacodyl    heparin flush    heparin flush    melatonin    ondansetron    Or    ondansetron     Continuous Medications   Medication Dose Last Rate       Physical Exam:  Alert and oriented, she does have shortness of breath.  No JVD noted.  She has expiratory wheezes anterior lungs.  S1-S2 is regular I do not appreciate a murmur. V paced rhythm.  Abdomen is soft with normal bowel sounds  LE's with chronic vascular skin changes. No BLE edema     Assessment/Plan   Acute decompensated heart failure-diastolic heart failure  Acute hypoxic failure  RSV infection/pneumonia  Abnormal troponin elevation  Essential hypertension  Hyperlipidemia  Permanent atrial fibrillation  Sick sinus syndrome status post PPM  Progressive supranuclear palsy     Plan  -Suspect acute decompensation in the setting of underlying viral illness.  -Abnormal troponin elevation not suggestive of ACS.  This is likely related to underlying CHF/atypical pneumonia.  Discontinue checking when downtrended.  -Obtain TTE to evaluate for any new structural abnormalities.  -Suggest Lasix 40 mg IV twice daily.  -Suggest giving metolazone 2.5 mg daily at this time and when clinical status is stable we could then resume 3 times a week as needed as previously taking.  -Continue Coumadin for anticoagulation.  -Resume home metoprolol.  -Add low-dose losartan for better blood pressure control.  -Management of RSV and  other medical condition as per primary team.  -Telemetry monitoring.  Daily weight.  Strict I's and O's.  -We will consider cardioversion for a flutter if clinical status does not improve.     Cardiology will follow please call with any questions.    Acute on chronic HFpEF - decompensation in the setting of pneumonia  Echocardiogram shows preserved EF, no RWMA, moderate TR and RVSP 45 mm hg. she continues to have symptoms of congestion will continue IV Lasix.  Renal function is stable.  Potassium is low.  Continue IV Lasix.   Will increase IV diuretic dose and monitor.   BMP/Mag in a.m.  1-6-25:  Still with dyspnea and cough.  Wheezes and crackles noted.  Would continue on IV Lasix and metolazone for now. BNP elevated.   1/7/25:  Continues to have ALONSO/cough.  Expiratory wheezing noted on exam. No crackles.  Renal function is stable.  Urine in collection bucket is melisa.  Will continue IV furosemide and metolazone for now as her renal function is stable.  Continue to monitor renal function and electrolytes while diuresing.     2. Elevated Troponin:  trending down.  Not suggestive of ACS.  No RWMA on echocardiogram.   1-6-25: No CP/pressure. Troponins were flat.   1/7/25: Echo with normal LVEF.  Denies any chest pain/pressure.     3. Chronic atrial fibrillation:  Telemetry is showing ventricular paced rhythm today. Chronic OAC with coumdin. INR 2.0-3.0. EKG shows atrial flutter with ventricular pacing.   HR is controlled currently.   1-6-25: Afib with Vpacing on tele.  [facility] to follow warfarin, INR 3.6 today.  1/7/25: Atrial flutter on telemetry/Vpaced with HR 60 bpm.  INR 2.5. We will consider cardioversion for a flutter if clinical status does not improve.     4. RSV:  treatment per IMS.   1/7/25:  Expiratory wheezing noted on exam.  Discussed with RN and she will reach out to hospitalist for possible nebulizer/respiratory treatments.     5. Hypokalemia:   Will give additional potassium supplement today.  Recheck lab  in a.m.   1-6-25: K stable at 3.8  1/7/25: Serum potassium 3.6    Code Status:  Full Code        Megan Kinney, APRN-CNP

## 2025-01-07 NOTE — PROGRESS NOTES
Physical Therapy  Physical Therapy Treatment    Patient Name: Bethany Espinoza  MRN: [id number]  Department: [address]  Room: 2026/2026-A  Today's Date: 1/7/2025  Time Calculation  Start Time: 1150  Stop Time: 1205  Time Calculation (min): 15 min    PT Plan  Treatment/Interventions: Bed mobility, Transfer training, Gait training, Neuromuscular re-education, Therapeutic exercise, Therapeutic activity  PT Plan: Ongoing PT  PT Frequency: 4 times per week  PT Discharge Recommendations: Moderate intensity level of continued care  PT Recommended Transfer Status: Assist x2  PT - OK to Discharge: Yes    General Visit Information:   PT  Visit  PT Received On: 01/07/25  Response to Previous Treatment: Patient with no complaints from previous session.  General  Reason for Referral: acute hypoxic resp. failure, RSV, weakness    Precautions:  contact+ precautions    Pain:  No pain    Cognition:  Within Functional Limits    Activity Tolerance:  Activity Tolerance  Endurance: Tolerates 10 - 20 min exercise with multiple rests    Treatments:  Bed Mobility  Supine to sitting: Moderate assistance  Scooting: Moderate assistance    Transfers  Sit to stand: Minimum assistance  Stand to sit: Minimum assistance  Stand pivot: Minimum assistance        Pt left in nursing care following tx.        Outcome Measures:  [facility] Basic Mobility  Turning from your back to your side while in a flat bed without using bedrails: A lot  Moving from lying on your back to sitting on the side of a flat bed without using bedrails: A lot  Moving to and from bed to chair (including a wheelchair): A little  Standing up from a chair using your arms (e.g. wheelchair or bedside chair): A lot  To walk in hospital room: A lot  Climbing 3-5 steps with railing: Total  Basic Mobility - Total Score: 12    Education Documentation  Mobility Training, taught by Alexey Knenedy PTA at 1/7/2025  1:44 PM.  Learner: Patient  Readiness: Acceptance  Method: Explanation,  Demonstration  Response: Verbalizes Understanding    Education Comments  No comments found.        OP EDUCATION:       Encounter Problems       Encounter Problems (Active)       Mobility       STG - Patient will ambulate household distance using walker and no more than SB assist (Progressing)       Start:  01/06/25    Expected End:  01/20/25               PT Transfers       STG - Transfer from bed to chair/BSC using walker and no more than SB assist (Progressing)       Start:  01/06/25    Expected End:  01/20/25               Pain - Adult          Strengthening        Pt will perform 10+ reps AAROM/AROM/RROM BLE to improve functional strength needed for improved mobility.  (Progressing)       Start:  01/06/25    Expected End:  01/20/25

## 2025-01-07 NOTE — PROGRESS NOTES
01/07/25 1444   Discharge Planning   Expected Discharge Disposition Home   Intensity of Service   Intensity of Service 0-30 min     I met with pt to discuss PT rec for mod therapy which pt declined.  She also declined HC.  She said she had it back in May and didn't feel she needed it again.  I called and spoke with pts daughter, Bethany who agreed that pt does not need rehab or HC.  She said she gets pt out and around and with her disease process, pt has good and bad days.  Bethany said that she spends a lot of time caring for her mom and making vanna she gets moving.  Plan will be to return home when medically ready to discharge.  Pt may need O2 if unable to wean.  I also explained this to Bethany and answered her questions about it.

## 2025-01-07 NOTE — CARE PLAN
The patient's goals for the shift include Pt. will have a safe, restful and uneventful evening    The clinical goals for the shift include SPO2>90% throughout shift      Problem: Skin  Goal: Decreased wound size/increased tissue granulation at next dressing change  Flowsheets (Taken 1/7/2025 0132)  Decreased wound size/increased tissue granulation at next dressing change: Promote sleep for wound healing  Goal: Participates in plan/prevention/treatment measures  Flowsheets (Taken 1/7/2025 0132)  Participates in plan/prevention/treatment measures:   Increase activity/out of bed for meals   Discuss with provider PT/OT consult  Goal: Prevent/manage excess moisture  Flowsheets (Taken 1/7/2025 0132)  Prevent/manage excess moisture: Cleanse incontinence/protect with barrier cream  Goal: Prevent/minimize sheer/friction injuries  Flowsheets (Taken 1/7/2025 0132)  Prevent/minimize sheer/friction injuries: Turn/reposition every 2 hours/use positioning/transfer devices  Goal: Promote/optimize nutrition  Flowsheets (Taken 1/7/2025 0132)  Promote/optimize nutrition: Monitor/record intake including meals  Goal: Promote skin healing  Flowsheets (Taken 1/7/2025 0132)  Promote skin healing: Turn/reposition every 2 hours/use positioning/transfer devices

## 2025-01-07 NOTE — PROGRESS NOTES
"Bethany Espinoza is a 79 y.o. female on day 3 of admission presenting with Acute hypoxic respiratory failure (Multi).      Subjective   Bethany Espinoza is a 79 y.o. female with PMHx s/f HTN, HLD, CAD, Afib/flutter on warfarin, SSS s/p PPM, chronic diastolic heart failure, pHTN, valvular heart disease, PVD, progressive supranuclear palsy (predominantly immobile; also has issues with dysphagia), obesity, presenting with SOB, cough, congestion, and worsening weakness. Hx mostly obtained from her daughter at the bedside. In short, pt was in her typical state of health until Monday (12/30/24) when she became congested. Was concerned this may have been RSV or a sinus infection as her grandson had visited and was just getting over RSV. She went to see her PCP and was prescribed decongestants and Augmentin 12/31/24 to help with symptoms; tested negative for Flu, COVID, and ?RSV at that time. Over the next few days, daughter called in to check on her; until day of admission (01/03/2025), patient had reported that she had been doing okay, but today she informed her daughter that she was feeling unwell and needed assistance. When her daughter arrived, she found the patient to sound like she was \"drowning\" in her fluid around her lungs which has happened with prior issues with her heart failure, additionally she had been so weak that her patient's  was unable to help get her up out of bed to go to the bathroom and she did end up soiling herself. Patient reports that she has felt nauseous and has had diarrhea x 1 day as well, does not appear to have missed any medications in terms of heart failure; however, has just been generally unwell. Denies cp/pressure, palpitations, diaphoresis, dizziness / lightheadedness, syncope or near syncope, HA, vision changes, f/c, abd pain. Has chronic baseline lower extremity edema which is unchanged times many years per daughter at the bedside.     ED Course (Summary - please note all " labs, imaging studies, and interventions noted below have been personally reviewed and/or interpreted on day of admission):   Vitals on presentation: T98.2, /83, HR 60, RR 30 (most recently 24), SpO2 96% 2 L nasal cannula  Labs: CBC with WBC 5.2, Hgb 10.8, platelets 149.  CMP with glucose 121, sodium 138, potassium 4.3, BUN 17, serum creatinine 0.94.  Lactate 1.6.  .  High-sensitivity troponin 17-59-julznw on admission 57 and 55.  PT/INR: 22.4/2.8.  RSV positive; flu A, B, COVID PCR all negative.  VBG relatively unrevealing.  UA without evidence of infection.  EKG: Sinus with short CO; right bundle branch block which has been previously seen on prior EKGs as well, no overt acute ischemic changes compared to prior  Imaging: CXR with moderate cardiomegaly, vascular congestion  Interventions: No medications given in the ED; cardiology was consulted for elevated troponin and they felt given the EKG stability compared to prior there was no need for heparinization unless the patient was not therapeutic with her warfarin.  Admitted to medicine for further management     12-point ROS reviewed and found to be negative aside from aforementioned positives in HPI and/or noted in dedicated ROS section below.      1/4/2025: Patient c/o shortness of breath. She remained on NC oxygen 4 L. Cardiology note appreciated. Add Norvasc and Zaroxolyn. Due to HR 60, no home meds of metoprolol listed.     1/5/2025: Patient remained shortness of breath and wheezing. She is on NC oxygen 3.5 L. Breo ellipta ordered for wheezing. Add empirical Doxycyclione for possible bacterial infection  1/6/2025: Patient was seen and examined.  Continues to complain of dry cough and some stuffy nose.  I we will add a humidifier to patient's oxygen.  Saturating 97% on 4 L will have weaned down to 3 and will continue weaning as tolerated.  Continue breathing treatment steroids and antibiotics.  Echocardiogram shows EF of 55 to 60% with dilated left  atrium and mildly elevated right ventricular systolic pressure.  Blood cultures are negative x 2 days.  INR still supratherapeutic at 3.6.  Repeat CBC and BMP in AM.  1/7/2025: Patient was seen and examined.  Now saturating well on 3 L nasal cannula oxygen.  Still complaining of cough and is severely deconditioned.  Bicarb level is at 37 today likely due to contraction alkalosis.  Will order 1 dose of acetazolamide IV.  PT recommended moderate intensity was patient is refusing sling on nursing facility.  INR is now therapeutic at 2.5 today.  Will resume warfarin today.  Pharmacy to dose.  Repeat CBC and BMP in AM.    Objective     Last Recorded Vitals  /71 (BP Location: Left arm, Patient Position: Lying)   Pulse 60   Temp 36.3 °C (97.4 °F) (Temporal)   Resp 18   Wt 80.1 kg (176 lb 9.4 oz)   SpO2 92%   Intake/Output last 3 Shifts:    Intake/Output Summary (Last 24 hours) at 1/7/2025 1543  Last data filed at 1/7/2025 1535  Gross per 24 hour   Intake --   Output 3050 ml   Net -3050 ml       Admission Weight  Weight: 81.6 kg (180 lb) (01/03/25 1403)    Daily Weight  01/07/25 : 80.1 kg (176 lb 9.4 oz)    Image Results  Electrocardiogram, 12-lead PRN ACS symptoms  Ventricular-paced rhythm  underlying atrial flutter  Abnormal ECG  When compared with ECG of 03-JAN-2025 18:03,  PREVIOUS ECG IS PRESENT  Confirmed by Samuel Mclaughlin ([id number]) on 1/6/2025 3:24:57 PM  Electrocardiogram, 12-lead PRN ACS symptoms  Ventricular-paced rhythm  underlying atrial flutter  Abnormal ECG  When compared with ECG of 05-JAN-2025 12:13, (unconfirmed)  No significant change was found  Confirmed by Samuel Mclaughlin ([id number]) on 1/6/2025 3:24:32 PM  Electrocardiogram, 12-lead PRN ACS symptoms  Ventricular-paced rhythm  Abnormal ECG  When compared with ECG of 05-JAN-2025 12:00, (unconfirmed)  No significant change was found  Confirmed by Samuel Mclaughlin ([id number]) on 1/6/2025 3:24:41 PM  ECG 12 lead  Sinus rhythm  Short IA  interval  Probable left atrial enlargement  Right bundle branch block  Left ventricular hypertrophy  Anterolateral infarct, age indeterminate  ECG 12 lead  Sinus rhythm  Short DE interval  IVCD, consider atypical RBBB  Anterolateral infarct, age indeterminate  Baseline wander in lead(s) V4,V6      Physical Exam  Constitutional:       General: She is in acute distress.      Appearance: She is ill-appearing.   HENT:      Head: Normocephalic.      Mouth/Throat:      Mouth: Mucous membranes are moist.      Pharynx: Oropharynx is clear.   Eyes:      Pupils: Pupils are equal, round, and reactive to light.   Cardiovascular:      Rate and Rhythm: Normal rate and regular rhythm.      Heart sounds: Normal heart sounds. No murmur heard.     No gallop.   Pulmonary:      Effort: Respiratory distress present.      Breath sounds: Wheezing and rales present.   Abdominal:      General: Bowel sounds are normal. There is no distension.      Palpations: Abdomen is soft.      Tenderness: There is no abdominal tenderness.   Musculoskeletal:         General: No swelling. Normal range of motion.      Cervical back: Neck supple. No rigidity.   Skin:     General: Skin is warm and dry.   Neurological:      General: No focal deficit present.      Mental Status: She is alert.      Cranial Nerves: No cranial nerve deficit.      Sensory: No sensory deficit.   Psychiatric:         Mood and Affect: Mood normal.         Behavior: Behavior normal.         Relevant Results             Scheduled medications  amLODIPine, 5 mg, oral, Daily  atorvastatin, 10 mg, oral, Daily  azelastine, 1 spray, Each Nostril, BID  donepezil, 10 mg, oral, Nightly  doxycycline, 100 mg, oral, q12h DAQUAN  fluticasone, 2 spray, Each Nostril, Daily  fluticasone furoate-vilanteroL, 1 puff, inhalation, Daily  furosemide, 40 mg, intravenous, BID  guaiFENesin, 1,200 mg, oral, BID  metOLazone, 2.5 mg, oral, Daily  nystatin, 1 Application, Topical, BID  oxygen, , inhalation, Continuous  - Inhalation  pantoprazole, 40 mg, oral, Daily before breakfast   Or  pantoprazole, 40 mg, intravenous, Daily before breakfast  polyethylene glycol, 17 g, oral, Daily  potassium chloride CR, 20 mEq, oral, BID  warfarin, 1 mg, oral, Once      Continuous medications     PRN medications  PRN medications: acetaminophen, albuterol, alteplase, benzocaine-menthol, bisacodyl, bisacodyl, heparin flush, heparin flush, ipratropium-albuteroL, melatonin, ondansetron **OR** ondansetron  Results for orders placed or performed during the hospital encounter of 01/03/25 (from the past 96 hours)   Protime-INR   Result Value Ref Range    Protime 32.4 (H) 9.8 - 12.8 seconds    INR 2.8 (H) 0.9 - 1.1   Troponin I, High Sensitivity   Result Value Ref Range    Troponin I, High Sensitivity 57 (HH) 0 - 13 ng/L   Urinalysis with Reflex Culture and Microscopic   Result Value Ref Range    Color, Urine Yellow Light-Yellow, Yellow, Dark-Yellow    Appearance, Urine Ex.Turbid (N) Clear    Specific Gravity, Urine 1.043 (N) 1.005 - 1.035    pH, Urine 5.5 5.0, 5.5, 6.0, 6.5, 7.0, 7.5, 8.0    Protein, Urine 100 (2+) (A) NEGATIVE, 10 (TRACE), 20 (TRACE) mg/dL    Glucose, Urine Normal Normal mg/dL    Blood, Urine 0.2 (2+) (A) NEGATIVE    Ketones, Urine NEGATIVE NEGATIVE mg/dL    Bilirubin, Urine NEGATIVE NEGATIVE    Urobilinogen, Urine 2 (1+) (A) Normal mg/dL    Nitrite, Urine NEGATIVE NEGATIVE    Leukocyte Esterase, Urine NEGATIVE NEGATIVE   Extra Urine Gray Tube   Result Value Ref Range    Extra Tube Hold for add-ons.    Urinalysis Microscopic   Result Value Ref Range    WBC, Urine NONE 1-5, NONE /HPF    RBC, Urine >20 (A) NONE, 1-2, 3-5 /HPF    Squamous Epithelial Cells, Urine 1-9 (SPARSE) Reference range not established. /HPF    Calcium Oxalate Crystals, Urine 4+ (A) NONE, 1+ /HPF   ECG 12 lead   Result Value Ref Range    Ventricular Rate 60 BPM    Atrial Rate 144 BPM    LA Interval 63 ms    QRS Duration 151 ms    QT Interval 491 ms    QTC  Calculation(Bazett) 491 ms    P Axis 0 degrees    R Axis -84 degrees    T Axis 82 degrees    QRS Count 9 beats    Q Onset 252 ms    T Offset 498 ms    QTC Fredericia 491 ms   Troponin I, High Sensitivity   Result Value Ref Range    Troponin I, High Sensitivity 55 (HH) 0 - 13 ng/L   Comprehensive Metabolic Panel   Result Value Ref Range    Glucose      Sodium      Potassium      Chloride      Bicarbonate      Anion Gap      Urea Nitrogen      Creatinine      eGFR      Calcium      Albumin      Alkaline Phosphatase      Total Protein      AST      Bilirubin, Total      ALT     Magnesium   Result Value Ref Range    Magnesium     Protime-INR   Result Value Ref Range    Protime      INR     Staphylococcus Aureus/MRSA Colonization, Culture    Specimen: Anterior Nares; Swab   Result Value Ref Range    Staph/MRSA Screen Culture No Staphylococcus aureus isolated    CBC and Auto Differential   Result Value Ref Range    WBC 5.8 4.4 - 11.3 x10*3/uL    nRBC 0.0 0.0 - 0.0 /100 WBCs    RBC 3.83 (L) 4.00 - 5.20 x10*6/uL    Hemoglobin 9.9 (L) 12.0 - 16.0 g/dL    Hematocrit 33.8 (L) 36.0 - 46.0 %    MCV 88 80 - 100 fL    MCH 25.8 (L) 26.0 - 34.0 pg    MCHC 29.3 (L) 32.0 - 36.0 g/dL    RDW 14.7 (H) 11.5 - 14.5 %    Platelets 139 (L) 150 - 450 x10*3/uL    Neutrophils % 81.8 40.0 - 80.0 %    Immature Granulocytes %, Automated 0.3 0.0 - 0.9 %    Lymphocytes % 7.1 13.0 - 44.0 %    Monocytes % 8.7 2.0 - 10.0 %    Eosinophils % 1.4 0.0 - 6.0 %    Basophils % 0.7 0.0 - 2.0 %    Neutrophils Absolute 4.71 1.60 - 5.50 x10*3/uL    Immature Granulocytes Absolute, Automated 0.02 0.00 - 0.50 x10*3/uL    Lymphocytes Absolute 0.41 (L) 0.80 - 3.00 x10*3/uL    Monocytes Absolute 0.50 0.05 - 0.80 x10*3/uL    Eosinophils Absolute 0.08 0.00 - 0.40 x10*3/uL    Basophils Absolute 0.04 0.00 - 0.10 x10*3/uL   Comprehensive metabolic panel   Result Value Ref Range    Glucose 103 (H) 74 - 99 mg/dL    Sodium 140 136 - 145 mmol/L    Potassium 4.2 3.5 - 5.3 mmol/L     Chloride 100 98 - 107 mmol/L    Bicarbonate 32 21 - 32 mmol/L    Anion Gap 12 10 - 20 mmol/L    Urea Nitrogen 16 6 - 23 mg/dL    Creatinine 1.04 0.50 - 1.05 mg/dL    eGFR 54 (L) >60 mL/min/1.73m*2    Calcium 8.9 8.6 - 10.3 mg/dL    Albumin 4.1 3.4 - 5.0 g/dL    Alkaline Phosphatase 66 33 - 136 U/L    Total Protein 7.1 6.4 - 8.2 g/dL    AST 31 9 - 39 U/L    Bilirubin, Total 1.1 0.0 - 1.2 mg/dL    ALT 11 7 - 45 U/L   Magnesium   Result Value Ref Range    Magnesium 2.09 1.60 - 2.40 mg/dL   Protime-INR   Result Value Ref Range    Protime 31.2 (H) 9.8 - 12.8 seconds    INR 2.7 (H) 0.9 - 1.1   Transthoracic Echo (TTE) Complete   Result Value Ref Range    LVOT diam 2.00 cm    LV Biplane EF 58 %    MV E/A ratio 2.01     Tricuspid annular plane systolic excursion 2.4 cm    AV mn grad 5 mmHg    LA vol index A/L 48.7 ml/m2    AV pk song 1.43 m/s    LV EF 58 %    RV free wall pk S' 11.80 cm/s    RVSP 45.0 mmHg    LVIDd 5.22 cm    Aortic Valve Area by Continuity of VTI 1.77 cm2    Aortic Valve Area by Continuity of Peak Velocity 1.87 cm2    AV pk grad 8 mmHg    LV A4C EF 57.7    Protime-INR   Result Value Ref Range    Protime 39.1 (H) 9.8 - 12.8 seconds    INR 3.4 (H) 0.9 - 1.1   Basic Metabolic Panel   Result Value Ref Range    Glucose 106 (H) 74 - 99 mg/dL    Sodium 140 136 - 145 mmol/L    Potassium 3.0 (L) 3.5 - 5.3 mmol/L    Chloride 103 98 - 107 mmol/L    Bicarbonate 30 21 - 32 mmol/L    Anion Gap 10 10 - 20 mmol/L    Urea Nitrogen 14 6 - 23 mg/dL    Creatinine 0.79 0.50 - 1.05 mg/dL    eGFR 76 >60 mL/min/1.73m*2    Calcium 7.4 (L) 8.6 - 10.3 mg/dL   CBC   Result Value Ref Range    WBC 6.5 4.4 - 11.3 x10*3/uL    nRBC 0.0 0.0 - 0.0 /100 WBCs    RBC 3.89 (L) 4.00 - 5.20 x10*6/uL    Hemoglobin 10.5 (L) 12.0 - 16.0 g/dL    Hematocrit 34.6 (L) 36.0 - 46.0 %    MCV 89 80 - 100 fL    MCH 27.0 26.0 - 34.0 pg    MCHC 30.3 (L) 32.0 - 36.0 g/dL    RDW 14.6 (H) 11.5 - 14.5 %    Platelets 141 (L) 150 - 450 x10*3/uL    Electrocardiogram, 12-lead PRN ACS symptoms   Result Value Ref Range    Ventricular Rate 60 BPM    Atrial Rate 288 BPM    QRS Duration 138 ms    QT Interval 480 ms    QTC Calculation(Bazett) 480 ms    R Axis -87 degrees    T Axis 94 degrees    QRS Count 10 beats    Q Onset 197 ms    T Offset 437 ms    QTC Fredericia 480 ms   Electrocardiogram, 12-lead PRN ACS symptoms   Result Value Ref Range    Ventricular Rate 60 BPM    Atrial Rate 288 BPM    QRS Duration 140 ms    QT Interval 494 ms    QTC Calculation(Bazett) 494 ms    R Axis -82 degrees    T Axis 79 degrees    QRS Count 10 beats    Q Onset 193 ms    T Offset 440 ms    QTC Fredericia 494 ms   Electrocardiogram, 12-lead PRN ACS symptoms   Result Value Ref Range    Ventricular Rate 60 BPM    Atrial Rate 288 BPM    QRS Duration 134 ms    QT Interval 484 ms    QTC Calculation(Bazett) 484 ms    R Axis -84 degrees    T Axis 86 degrees    QRS Count 10 beats    Q Onset 195 ms    T Offset 437 ms    QTC Fredericia 484 ms   Protime-INR   Result Value Ref Range    Protime 41.0 (H) 9.8 - 12.8 seconds    INR 3.6 (H) 0.9 - 1.1   Basic Metabolic Panel   Result Value Ref Range    Glucose 83 74 - 99 mg/dL    Sodium 139 136 - 145 mmol/L    Potassium 3.8 3.5 - 5.3 mmol/L    Chloride 101 98 - 107 mmol/L    Bicarbonate 34 (H) 21 - 32 mmol/L    Anion Gap 8 (L) 10 - 20 mmol/L    Urea Nitrogen 13 6 - 23 mg/dL    Creatinine 0.78 0.50 - 1.05 mg/dL    eGFR 77 >60 mL/min/1.73m*2    Calcium 8.5 (L) 8.6 - 10.3 mg/dL   Magnesium   Result Value Ref Range    Magnesium 2.03 1.60 - 2.40 mg/dL   Protime-INR   Result Value Ref Range    Protime 28.7 (H) 9.8 - 12.8 seconds    INR 2.5 (H) 0.9 - 1.1   CBC and Auto Differential   Result Value Ref Range    WBC 6.1 4.4 - 11.3 x10*3/uL    nRBC 0.0 0.0 - 0.0 /100 WBCs    RBC 4.68 4.00 - 5.20 x10*6/uL    Hemoglobin 11.9 (L) 12.0 - 16.0 g/dL    Hematocrit 40.1 36.0 - 46.0 %    MCV 86 80 - 100 fL    MCH 25.4 (L) 26.0 - 34.0 pg    MCHC 29.7 (L) 32.0 - 36.0 g/dL     RDW 14.5 11.5 - 14.5 %    Platelets 189 150 - 450 x10*3/uL    Neutrophils % 72.2 40.0 - 80.0 %    Immature Granulocytes %, Automated 0.2 0.0 - 0.9 %    Lymphocytes % 12.4 13.0 - 44.0 %    Monocytes % 10.3 2.0 - 10.0 %    Eosinophils % 4.4 0.0 - 6.0 %    Basophils % 0.5 0.0 - 2.0 %    Neutrophils Absolute 4.42 1.60 - 5.50 x10*3/uL    Immature Granulocytes Absolute, Automated 0.01 0.00 - 0.50 x10*3/uL    Lymphocytes Absolute 0.76 (L) 0.80 - 3.00 x10*3/uL    Monocytes Absolute 0.63 0.05 - 0.80 x10*3/uL    Eosinophils Absolute 0.27 0.00 - 0.40 x10*3/uL    Basophils Absolute 0.03 0.00 - 0.10 x10*3/uL   Basic Metabolic Panel   Result Value Ref Range    Glucose 104 (H) 74 - 99 mg/dL    Sodium 138 136 - 145 mmol/L    Potassium 3.6 3.5 - 5.3 mmol/L    Chloride 93 (L) 98 - 107 mmol/L    Bicarbonate 37 (H) 21 - 32 mmol/L    Anion Gap 12 10 - 20 mmol/L    Urea Nitrogen 20 6 - 23 mg/dL    Creatinine 0.90 0.50 - 1.05 mg/dL    eGFR 65 >60 mL/min/1.73m*2    Calcium 9.5 8.6 - 10.3 mg/dL       Assessment/Plan   This patient currently has cardiac telemetry ordered; if you would like to modify or discontinue the telemetry order, click here to go to the orders activity to modify/discontinue the order.              Assessment & Plan  Acute hypoxic respiratory failure (Multi)      1. Acute hypoxic respiratory failure (Multi)      continue oxygen therapy, wean as possible, manage underlying CHF and RSV infection      2. RSV bronchiolitis      supportive care, steroids and bronchodilator. add empirical Doxycycline for bacterial infection and add Breo ellipta for wheezing.      3. Acute on chronic congestive heart failure, unspecified heart failure type  Transthoracic Echo (TTE) Complete    Transthoracic Echo (TTE) Complete    continue Lasix and Zaroxolyn      4. Elevated troponin  Transthoracic Echo (TTE) Complete    Transthoracic Echo (TTE) Complete    partially 2/2 pulmonary conditions, no evidence of ACS      5. ALONSO (dyspnea on  exertion)  Transthoracic Echo (TTE) Complete    Transthoracic Echo (TTE) Complete      6. Primary hypertension      BP stable. add norvasc 5 mg for better control      7. Obesity, Class I, BMI 30-34.9        8. Permanent atrial fibrillation (Multi)      HR 60 s/p pacemaker      9. Sick sinus syndrome (Multi)      s/p pacemaker, HR 60, no beta blocker per outpatient management               Spent 35 minutes in the follow-up management of this patient today       Richie Cassidy MD

## 2025-01-07 NOTE — PROGRESS NOTES
Occupational Therapy    OT Treatment    Patient Name: Bethany Espinoza  MRN: [id number]  Department: [address]  Room: 2026/2026-A  Today's Date: 1/7/2025  Time Calculation  Start Time: 1336  Stop Time: 1359  Time Calculation (min): 23 min        Assessment:  End of Session Patient Position: Bed, 3 rail up, Alarm on     Plan:  Treatment Interventions: ADL retraining, Functional transfer training, Endurance training      Subjective   Previous Visit Info:  OT Last Visit  OT Received On: 01/07/25  General:  General  Patient Position Received: Bed, 3 rail up, Alarm on  General Comment: pt. declined to get out to bed she was agreeable to bed mobility and UE strengthening in prep for transfers., Pt. tolerated bed level ex. noted some coughing post completion spo2 sats 92%  Precautions:       Vital Signs (Past 2hrs)                 Pain:  Pain Assessment  Pain Assessment: 0-10  0-10 (Numeric) Pain Score: 0 - No pain    Objective    Cognition:  Cognition  Overall Cognitive Status: Within Functional Limits  Coordination:     Activities of Daily Living: pt. Declined too fatigued     Bed Mobility/Transfers: Bed Mobility  Bed Mobility: Yes  Bed Mobility 1  Bed Mobility 1: Scooting (boosted)  Level of Assistance 1: Maximum assistance  Bed Mobility Comments 1: pt. struggled to use LE's to boost self she was too fatigued       Therapy/Activity: Therapeutic Exercise  Therapeutic Exercise Performed: Yes  Therapeutic Exercise Activity 1: pt. tolerated shoulder flexion/ext., chest press, chest fly, horizontal adduction  and finger opposition 1x5 reps pt. with coughing between ex. . She was able to demonstrate max ROM  and good pacing            Outcome Measures:[facility] Daily Activity  Putting on and taking off regular lower body clothing: (P) Total  Bathing (including washing, rinsing, drying): (P) Total  Putting on and taking off regular upper body clothing: (P) A lot  Toileting, which includes using toilet, bedpan or urinal: (P)  Total  Taking care of personal grooming such as brushing teeth: (P) A lot  Eating Meals: (P) A lot  Daily Activity - Total Score: (P) 9        Education Documentation  Body Mechanics, taught by LUIS Ruiz at 1/7/2025  2:47 PM.  Learner: Patient  Readiness: Acceptance  Method: Explanation  Response: Verbalizes Understanding, Needs Reinforcement    Home Exercise Program, taught by LUIS Ruiz at 1/7/2025  2:47 PM.  Learner: Patient  Readiness: Acceptance  Method: Explanation  Response: Verbalizes Understanding, Needs Reinforcement    Education Comments  No comments found.        OP EDUCATION:       Goals:  Encounter Problems       Encounter Problems (Active)       ADLs       Demo. Min.A UB bathing, grooming while seated EOB.  Demo. Mod.A toileting on BSC.  (Not Progressing)       Start:  01/06/25    Expected End:  01/13/25               TRANSFERS       Func. trfrs. with Min.A and FWW  (Not Progressing)       Start:  01/06/25    Expected End:  01/13/25

## 2025-01-08 LAB
ANION GAP SERPL CALC-SCNC: 13 MMOL/L (ref 10–20)
BASOPHILS # BLD AUTO: 0.02 X10*3/UL (ref 0–0.1)
BASOPHILS NFR BLD AUTO: 0.3 %
BUN SERPL-MCNC: 30 MG/DL (ref 6–23)
CALCIUM SERPL-MCNC: 9.6 MG/DL (ref 8.6–10.3)
CHLORIDE SERPL-SCNC: 91 MMOL/L (ref 98–107)
CO2 SERPL-SCNC: 37 MMOL/L (ref 21–32)
CREAT SERPL-MCNC: 1.19 MG/DL (ref 0.5–1.05)
EGFRCR SERPLBLD CKD-EPI 2021: 46 ML/MIN/1.73M*2
EOSINOPHIL # BLD AUTO: 0.3 X10*3/UL (ref 0–0.4)
EOSINOPHIL NFR BLD AUTO: 4.3 %
ERYTHROCYTE [DISTWIDTH] IN BLOOD BY AUTOMATED COUNT: 14.6 % (ref 11.5–14.5)
GLUCOSE SERPL-MCNC: 108 MG/DL (ref 74–99)
HCT VFR BLD AUTO: 40 % (ref 36–46)
HGB BLD-MCNC: 12 G/DL (ref 12–16)
IMM GRANULOCYTES # BLD AUTO: 0.03 X10*3/UL (ref 0–0.5)
IMM GRANULOCYTES NFR BLD AUTO: 0.4 % (ref 0–0.9)
INR PPP: 2.2 (ref 0.9–1.1)
LYMPHOCYTES # BLD AUTO: 0.77 X10*3/UL (ref 0.8–3)
LYMPHOCYTES NFR BLD AUTO: 11.2 %
MCH RBC QN AUTO: 26.1 PG (ref 26–34)
MCHC RBC AUTO-ENTMCNC: 30 G/DL (ref 32–36)
MCV RBC AUTO: 87 FL (ref 80–100)
MONOCYTES # BLD AUTO: 0.64 X10*3/UL (ref 0.05–0.8)
MONOCYTES NFR BLD AUTO: 9.3 %
NEUTROPHILS # BLD AUTO: 5.14 X10*3/UL (ref 1.6–5.5)
NEUTROPHILS NFR BLD AUTO: 74.5 %
NRBC BLD-RTO: 0 /100 WBCS (ref 0–0)
PLATELET # BLD AUTO: 224 X10*3/UL (ref 150–450)
POTASSIUM SERPL-SCNC: 3.7 MMOL/L (ref 3.5–5.3)
PROTHROMBIN TIME: 24.9 SECONDS (ref 9.8–12.8)
RBC # BLD AUTO: 4.59 X10*6/UL (ref 4–5.2)
SODIUM SERPL-SCNC: 137 MMOL/L (ref 136–145)
WBC # BLD AUTO: 6.9 X10*3/UL (ref 4.4–11.3)

## 2025-01-08 PROCEDURE — 2500000005 HC RX 250 GENERAL PHARMACY W/O HCPCS: Performed by: STUDENT IN AN ORGANIZED HEALTH CARE EDUCATION/TRAINING PROGRAM

## 2025-01-08 PROCEDURE — 94640 AIRWAY INHALATION TREATMENT: CPT

## 2025-01-08 PROCEDURE — 2500000002 HC RX 250 W HCPCS SELF ADMINISTERED DRUGS (ALT 637 FOR MEDICARE OP, ALT 636 FOR OP/ED): Performed by: INTERNAL MEDICINE

## 2025-01-08 PROCEDURE — 2500000001 HC RX 250 WO HCPCS SELF ADMINISTERED DRUGS (ALT 637 FOR MEDICARE OP): Performed by: INTERNAL MEDICINE

## 2025-01-08 PROCEDURE — 80048 BASIC METABOLIC PNL TOTAL CA: CPT | Performed by: INTERNAL MEDICINE

## 2025-01-08 PROCEDURE — 2500000004 HC RX 250 GENERAL PHARMACY W/ HCPCS (ALT 636 FOR OP/ED): Performed by: STUDENT IN AN ORGANIZED HEALTH CARE EDUCATION/TRAINING PROGRAM

## 2025-01-08 PROCEDURE — 99233 SBSQ HOSP IP/OBS HIGH 50: CPT | Performed by: INTERNAL MEDICINE

## 2025-01-08 PROCEDURE — 2500000002 HC RX 250 W HCPCS SELF ADMINISTERED DRUGS (ALT 637 FOR MEDICARE OP, ALT 636 FOR OP/ED): Performed by: NURSE PRACTITIONER

## 2025-01-08 PROCEDURE — 2500000002 HC RX 250 W HCPCS SELF ADMINISTERED DRUGS (ALT 637 FOR MEDICARE OP, ALT 636 FOR OP/ED): Performed by: STUDENT IN AN ORGANIZED HEALTH CARE EDUCATION/TRAINING PROGRAM

## 2025-01-08 PROCEDURE — 85025 COMPLETE CBC W/AUTO DIFF WBC: CPT | Performed by: INTERNAL MEDICINE

## 2025-01-08 PROCEDURE — 36415 COLL VENOUS BLD VENIPUNCTURE: CPT | Performed by: INTERNAL MEDICINE

## 2025-01-08 PROCEDURE — 2500000004 HC RX 250 GENERAL PHARMACY W/ HCPCS (ALT 636 FOR OP/ED): Performed by: INTERNAL MEDICINE

## 2025-01-08 PROCEDURE — 2500000001 HC RX 250 WO HCPCS SELF ADMINISTERED DRUGS (ALT 637 FOR MEDICARE OP): Performed by: STUDENT IN AN ORGANIZED HEALTH CARE EDUCATION/TRAINING PROGRAM

## 2025-01-08 PROCEDURE — 85610 PROTHROMBIN TIME: CPT | Performed by: STUDENT IN AN ORGANIZED HEALTH CARE EDUCATION/TRAINING PROGRAM

## 2025-01-08 PROCEDURE — 97530 THERAPEUTIC ACTIVITIES: CPT | Mod: CQ,GP

## 2025-01-08 PROCEDURE — 99232 SBSQ HOSP IP/OBS MODERATE 35: CPT | Performed by: NURSE PRACTITIONER

## 2025-01-08 PROCEDURE — 2060000001 HC INTERMEDIATE ICU ROOM DAILY

## 2025-01-08 RX ORDER — IPRATROPIUM BROMIDE AND ALBUTEROL SULFATE 2.5; .5 MG/3ML; MG/3ML
3 SOLUTION RESPIRATORY (INHALATION)
Status: DISCONTINUED | OUTPATIENT
Start: 2025-01-08 | End: 2025-01-08

## 2025-01-08 RX ORDER — IPRATROPIUM BROMIDE AND ALBUTEROL SULFATE 2.5; .5 MG/3ML; MG/3ML
3 SOLUTION RESPIRATORY (INHALATION)
Status: DISCONTINUED | OUTPATIENT
Start: 2025-01-08 | End: 2025-01-11

## 2025-01-08 RX ORDER — WARFARIN 1 MG/1
1 TABLET ORAL ONCE
Status: COMPLETED | OUTPATIENT
Start: 2025-01-08 | End: 2025-01-08

## 2025-01-08 RX ORDER — FUROSEMIDE 10 MG/ML
40 INJECTION INTRAMUSCULAR; INTRAVENOUS DAILY
Status: DISCONTINUED | OUTPATIENT
Start: 2025-01-09 | End: 2025-01-11 | Stop reason: HOSPADM

## 2025-01-08 RX ADMIN — FUROSEMIDE 40 MG: 10 INJECTION, SOLUTION INTRAMUSCULAR; INTRAVENOUS at 08:38

## 2025-01-08 RX ADMIN — DOXYCYCLINE 100 MG: 100 CAPSULE ORAL at 21:12

## 2025-01-08 RX ADMIN — ATORVASTATIN CALCIUM 10 MG: 10 TABLET, FILM COATED ORAL at 08:38

## 2025-01-08 RX ADMIN — IPRATROPIUM BROMIDE AND ALBUTEROL SULFATE 3 ML: 2.5; .5 SOLUTION RESPIRATORY (INHALATION) at 20:36

## 2025-01-08 RX ADMIN — Medication 2 L/MIN: at 12:28

## 2025-01-08 RX ADMIN — AZELASTINE HYDROCHLORIDE 1 SPRAY: 137 SPRAY, METERED NASAL at 08:40

## 2025-01-08 RX ADMIN — Medication 2 L/MIN: at 20:36

## 2025-01-08 RX ADMIN — AZELASTINE HYDROCHLORIDE 1 SPRAY: 137 SPRAY, METERED NASAL at 21:18

## 2025-01-08 RX ADMIN — POTASSIUM CHLORIDE 20 MEQ: 1500 TABLET, EXTENDED RELEASE ORAL at 08:38

## 2025-01-08 RX ADMIN — DOXYCYCLINE 100 MG: 100 CAPSULE ORAL at 08:38

## 2025-01-08 RX ADMIN — POTASSIUM CHLORIDE 20 MEQ: 1500 TABLET, EXTENDED RELEASE ORAL at 21:12

## 2025-01-08 RX ADMIN — AMLODIPINE BESYLATE 5 MG: 5 TABLET ORAL at 08:38

## 2025-01-08 RX ADMIN — GUAIFENESIN 1200 MG: 600 TABLET ORAL at 08:38

## 2025-01-08 RX ADMIN — Medication 3 L/MIN: at 08:00

## 2025-01-08 RX ADMIN — IPRATROPIUM BROMIDE AND ALBUTEROL SULFATE 3 ML: 2.5; .5 SOLUTION RESPIRATORY (INHALATION) at 12:28

## 2025-01-08 RX ADMIN — DONEPEZIL HYDROCHLORIDE 10 MG: 5 TABLET ORAL at 21:12

## 2025-01-08 RX ADMIN — FLUTICASONE FUROATE AND VILANTEROL TRIFENATATE 1 PUFF: 200; 25 POWDER RESPIRATORY (INHALATION) at 15:39

## 2025-01-08 RX ADMIN — FLUTICASONE PROPIONATE 2 SPRAY: 50 SPRAY, METERED NASAL at 08:40

## 2025-01-08 RX ADMIN — METOLAZONE 2.5 MG: 5 TABLET ORAL at 08:38

## 2025-01-08 RX ADMIN — NYSTATIN 1 APPLICATION: 100000 POWDER TOPICAL at 10:36

## 2025-01-08 RX ADMIN — WARFARIN SODIUM 1 MG: 1 TABLET ORAL at 17:33

## 2025-01-08 RX ADMIN — GUAIFENESIN 1200 MG: 600 TABLET ORAL at 21:12

## 2025-01-08 RX ADMIN — NYSTATIN 1 APPLICATION: 100000 POWDER TOPICAL at 21:19

## 2025-01-08 RX ADMIN — PANTOPRAZOLE SODIUM 40 MG: 40 INJECTION, POWDER, FOR SOLUTION INTRAVENOUS at 05:44

## 2025-01-08 ASSESSMENT — COGNITIVE AND FUNCTIONAL STATUS - GENERAL
MOBILITY SCORE: 11
MOVING FROM LYING ON BACK TO SITTING ON SIDE OF FLAT BED WITH BEDRAILS: A LITTLE
CLIMB 3 TO 5 STEPS WITH RAILING: TOTAL
TURNING FROM BACK TO SIDE WHILE IN FLAT BAD: A LOT
EATING MEALS: A LOT
TURNING FROM BACK TO SIDE WHILE IN FLAT BAD: A LOT
STANDING UP FROM CHAIR USING ARMS: A LOT
PERSONAL GROOMING: A LOT
DAILY ACTIVITIY SCORE: 12
MOVING TO AND FROM BED TO CHAIR: A LOT
WALKING IN HOSPITAL ROOM: A LOT
DRESSING REGULAR LOWER BODY CLOTHING: A LOT
DRESSING REGULAR UPPER BODY CLOTHING: A LOT
MOVING FROM LYING ON BACK TO SITTING ON SIDE OF FLAT BED WITH BEDRAILS: A LOT
HELP NEEDED FOR BATHING: A LOT
MOVING TO AND FROM BED TO CHAIR: A LOT
WALKING IN HOSPITAL ROOM: TOTAL
CLIMB 3 TO 5 STEPS WITH RAILING: TOTAL
MOBILITY SCORE: 11
STANDING UP FROM CHAIR USING ARMS: A LOT
TOILETING: A LOT

## 2025-01-08 ASSESSMENT — PAIN - FUNCTIONAL ASSESSMENT: PAIN_FUNCTIONAL_ASSESSMENT: 0-10

## 2025-01-08 ASSESSMENT — PAIN SCALES - GENERAL
PAINLEVEL_OUTOF10: 0 - NO PAIN

## 2025-01-08 NOTE — PROGRESS NOTES
"Bethany Espinoza is a 79 y.o. female on day 4 of admission presenting with Acute hypoxic respiratory failure (Multi).      Subjective   Bethany Espinoza is a 79 y.o. female with PMHx s/f HTN, HLD, CAD, Afib/flutter on warfarin, SSS s/p PPM, chronic diastolic heart failure, pHTN, valvular heart disease, PVD, progressive supranuclear palsy (predominantly immobile; also has issues with dysphagia), obesity, presenting with SOB, cough, congestion, and worsening weakness. Hx mostly obtained from her daughter at the bedside. In short, pt was in her typical state of health until Monday (12/30/24) when she became congested. Was concerned this may have been RSV or a sinus infection as her grandson had visited and was just getting over RSV. She went to see her PCP and was prescribed decongestants and Augmentin 12/31/24 to help with symptoms; tested negative for Flu, COVID, and ?RSV at that time. Over the next few days, daughter called in to check on her; until day of admission (01/03/2025), patient had reported that she had been doing okay, but today she informed her daughter that she was feeling unwell and needed assistance. When her daughter arrived, she found the patient to sound like she was \"drowning\" in her fluid around her lungs which has happened with prior issues with her heart failure, additionally she had been so weak that her patient's  was unable to help get her up out of bed to go to the bathroom and she did end up soiling herself. Patient reports that she has felt nauseous and has had diarrhea x 1 day as well, does not appear to have missed any medications in terms of heart failure; however, has just been generally unwell. Denies cp/pressure, palpitations, diaphoresis, dizziness / lightheadedness, syncope or near syncope, HA, vision changes, f/c, abd pain. Has chronic baseline lower extremity edema which is unchanged times many years per daughter at the bedside.     ED Course (Summary - please note all " labs, imaging studies, and interventions noted below have been personally reviewed and/or interpreted on day of admission):   Vitals on presentation: T98.2, /83, HR 60, RR 30 (most recently 24), SpO2 96% 2 L nasal cannula  Labs: CBC with WBC 5.2, Hgb 10.8, platelets 149.  CMP with glucose 121, sodium 138, potassium 4.3, BUN 17, serum creatinine 0.94.  Lactate 1.6.  .  High-sensitivity troponin 42-62-pibwfu on admission 57 and 55.  PT/INR: 22.4/2.8.  RSV positive; flu A, B, COVID PCR all negative.  VBG relatively unrevealing.  UA without evidence of infection.  EKG: Sinus with short WA; right bundle branch block which has been previously seen on prior EKGs as well, no overt acute ischemic changes compared to prior  Imaging: CXR with moderate cardiomegaly, vascular congestion  Interventions: No medications given in the ED; cardiology was consulted for elevated troponin and they felt given the EKG stability compared to prior there was no need for heparinization unless the patient was not therapeutic with her warfarin.  Admitted to medicine for further management     12-point ROS reviewed and found to be negative aside from aforementioned positives in HPI and/or noted in dedicated ROS section below.      1/4/2025: Patient c/o shortness of breath. She remained on NC oxygen 4 L. Cardiology note appreciated. Add Norvasc and Zaroxolyn. Due to HR 60, no home meds of metoprolol listed.     1/5/2025: Patient remained shortness of breath and wheezing. She is on NC oxygen 3.5 L. Breo ellipta ordered for wheezing. Add empirical Doxycyclione for possible bacterial infection  1/6/2025: Patient was seen and examined.  Continues to complain of dry cough and some stuffy nose.  I we will add a humidifier to patient's oxygen.  Saturating 97% on 4 L will have weaned down to 3 and will continue weaning as tolerated.  Continue breathing treatment steroids and antibiotics.  Echocardiogram shows EF of 55 to 60% with dilated left  atrium and mildly elevated right ventricular systolic pressure.  Blood cultures are negative x 2 days.  INR still supratherapeutic at 3.6.  Repeat CBC and BMP in AM.  1/7/2025: Patient was seen and examined.  Now saturating well on 3 L nasal cannula oxygen.  Still complaining of cough and is severely deconditioned.  Bicarb level is at 37 today likely due to contraction alkalosis.  Will order 1 dose of acetazolamide IV.  PT recommended moderate intensity was patient is refusing skilled nursing facility.  INR is now therapeutic at 2.5 today.  Will resume warfarin today.  Pharmacy to dose.  Repeat CBC and BMP in AM.  1/8/2025: Patient was seen and examined.  Oxygen has been weaned down to 2 L nasal cannula oxygen today.  Patient showed bump in creatinine to 1.19 today.  Zaroxolyn has been discontinued.  She got 1 dose of Lasix this morning which I have suspended.  We will add chest physical therapy and scheduled DuoNebs today.  Respiratory therapist to add chest physical therapy.  INR still therapeutic at 2.2.  Continue warfarin at current dose.  Continue to trend CBC and BMP daily.    Objective     Last Recorded Vitals  /65 (BP Location: Left arm, Patient Position: Lying)   Pulse 59   Temp 36.3 °C (97.4 °F) (Temporal)   Resp 22   Wt 78.5 kg (173 lb 1 oz)   SpO2 91%   Intake/Output last 3 Shifts:    Intake/Output Summary (Last 24 hours) at 1/8/2025 1221  Last data filed at 1/8/2025 1100  Gross per 24 hour   Intake 740 ml   Output 925 ml   Net -185 ml       Admission Weight  Weight: 81.6 kg (180 lb) (01/03/25 1403)    Daily Weight  01/08/25 : 78.5 kg (173 lb 1 oz)    Image Results  Electrocardiogram, 12-lead PRN ACS symptoms  Ventricular-paced rhythm  underlying atrial flutter  Abnormal ECG  When compared with ECG of 03-JAN-2025 18:03,  PREVIOUS ECG IS PRESENT  Confirmed by Samuel Mclaughlin [id number]) on 1/6/2025 3:24:57 PM  Electrocardiogram, 12-lead PRN ACS symptoms  Ventricular-paced rhythm  underlying  atrial flutter  Abnormal ECG  When compared with ECG of 05-JAN-2025 12:13, (unconfirmed)  No significant change was found  Confirmed by Samuel Mclaughlin ([id number]) on 1/6/2025 3:24:32 PM  Electrocardiogram, 12-lead PRN ACS symptoms  Ventricular-paced rhythm  Abnormal ECG  When compared with ECG of 05-JAN-2025 12:00, (unconfirmed)  No significant change was found  Confirmed by Samuel Mclaughlin ([id number]) on 1/6/2025 3:24:41 PM  ECG 12 lead  Sinus rhythm  Short NM interval  Probable left atrial enlargement  Right bundle branch block  Left ventricular hypertrophy  Anterolateral infarct, age indeterminate  ECG 12 lead  Sinus rhythm  Short NM interval  IVCD, consider atypical RBBB  Anterolateral infarct, age indeterminate  Baseline wander in lead(s) V4,V6      Physical Exam  Constitutional:       General: She is in acute distress.      Appearance: She is ill-appearing.   HENT:      Head: Normocephalic.      Mouth/Throat:      Mouth: Mucous membranes are moist.      Pharynx: Oropharynx is clear.   Eyes:      Pupils: Pupils are equal, round, and reactive to light.   Cardiovascular:      Rate and Rhythm: Normal rate and regular rhythm.      Heart sounds: Normal heart sounds. No murmur heard.     No gallop.   Pulmonary:      Effort: Respiratory distress present.      Breath sounds: Wheezing and rales present.   Abdominal:      General: Bowel sounds are normal. There is no distension.      Palpations: Abdomen is soft.      Tenderness: There is no abdominal tenderness.   Musculoskeletal:         General: No swelling. Normal range of motion.      Cervical back: Neck supple. No rigidity.   Skin:     General: Skin is warm and dry.   Neurological:      General: No focal deficit present.      Mental Status: She is alert.      Cranial Nerves: No cranial nerve deficit.      Sensory: No sensory deficit.   Psychiatric:         Mood and Affect: Mood normal.         Behavior: Behavior normal.         Relevant Results             Scheduled  medications  amLODIPine, 5 mg, oral, Daily  atorvastatin, 10 mg, oral, Daily  azelastine, 1 spray, Each Nostril, BID  donepezil, 10 mg, oral, Nightly  doxycycline, 100 mg, oral, q12h DAQUAN  fluticasone, 2 spray, Each Nostril, Daily  fluticasone furoate-vilanteroL, 1 puff, inhalation, Daily  [START ON 1/9/2025] furosemide, 40 mg, intravenous, Daily  guaiFENesin, 1,200 mg, oral, BID  ipratropium-albuteroL, 3 mL, nebulization, q6h  nystatin, 1 Application, Topical, BID  oxygen, , inhalation, Continuous - Inhalation  pantoprazole, 40 mg, oral, Daily before breakfast   Or  pantoprazole, 40 mg, intravenous, Daily before breakfast  polyethylene glycol, 17 g, oral, Daily  potassium chloride CR, 20 mEq, oral, BID  warfarin, 1 mg, oral, Once      Continuous medications     PRN medications  PRN medications: acetaminophen, albuterol, alteplase, benzocaine-menthol, bisacodyl, bisacodyl, heparin flush, heparin flush, ipratropium-albuteroL, melatonin, ondansetron **OR** ondansetron  Results for orders placed or performed during the hospital encounter of 01/03/25 (from the past 96 hours)   Transthoracic Echo (TTE) Complete   Result Value Ref Range    LVOT diam 2.00 cm    LV Biplane EF 58 %    MV E/A ratio 2.01     Tricuspid annular plane systolic excursion 2.4 cm    AV mn grad 5 mmHg    LA vol index A/L 48.7 ml/m2    AV pk song 1.43 m/s    LV EF 58 %    RV free wall pk S' 11.80 cm/s    RVSP 45.0 mmHg    LVIDd 5.22 cm    Aortic Valve Area by Continuity of VTI 1.77 cm2    Aortic Valve Area by Continuity of Peak Velocity 1.87 cm2    AV pk grad 8 mmHg    LV A4C EF 57.7    Protime-INR   Result Value Ref Range    Protime 39.1 (H) 9.8 - 12.8 seconds    INR 3.4 (H) 0.9 - 1.1   Basic Metabolic Panel   Result Value Ref Range    Glucose 106 (H) 74 - 99 mg/dL    Sodium 140 136 - 145 mmol/L    Potassium 3.0 (L) 3.5 - 5.3 mmol/L    Chloride 103 98 - 107 mmol/L    Bicarbonate 30 21 - 32 mmol/L    Anion Gap 10 10 - 20 mmol/L    Urea Nitrogen 14 6 -  23 mg/dL    Creatinine 0.79 0.50 - 1.05 mg/dL    eGFR 76 >60 mL/min/1.73m*2    Calcium 7.4 (L) 8.6 - 10.3 mg/dL   CBC   Result Value Ref Range    WBC 6.5 4.4 - 11.3 x10*3/uL    nRBC 0.0 0.0 - 0.0 /100 WBCs    RBC 3.89 (L) 4.00 - 5.20 x10*6/uL    Hemoglobin 10.5 (L) 12.0 - 16.0 g/dL    Hematocrit 34.6 (L) 36.0 - 46.0 %    MCV 89 80 - 100 fL    MCH 27.0 26.0 - 34.0 pg    MCHC 30.3 (L) 32.0 - 36.0 g/dL    RDW 14.6 (H) 11.5 - 14.5 %    Platelets 141 (L) 150 - 450 x10*3/uL   Electrocardiogram, 12-lead PRN ACS symptoms   Result Value Ref Range    Ventricular Rate 60 BPM    Atrial Rate 288 BPM    QRS Duration 138 ms    QT Interval 480 ms    QTC Calculation(Bazett) 480 ms    R Axis -87 degrees    T Axis 94 degrees    QRS Count 10 beats    Q Onset 197 ms    T Offset 437 ms    QTC Fredericia 480 ms   Electrocardiogram, 12-lead PRN ACS symptoms   Result Value Ref Range    Ventricular Rate 60 BPM    Atrial Rate 288 BPM    QRS Duration 140 ms    QT Interval 494 ms    QTC Calculation(Bazett) 494 ms    R Axis -82 degrees    T Axis 79 degrees    QRS Count 10 beats    Q Onset 193 ms    T Offset 440 ms    QTC Fredericia 494 ms   Electrocardiogram, 12-lead PRN ACS symptoms   Result Value Ref Range    Ventricular Rate 60 BPM    Atrial Rate 288 BPM    QRS Duration 134 ms    QT Interval 484 ms    QTC Calculation(Bazett) 484 ms    R Axis -84 degrees    T Axis 86 degrees    QRS Count 10 beats    Q Onset 195 ms    T Offset 437 ms    QTC Fredericia 484 ms   Protime-INR   Result Value Ref Range    Protime 41.0 (H) 9.8 - 12.8 seconds    INR 3.6 (H) 0.9 - 1.1   Basic Metabolic Panel   Result Value Ref Range    Glucose 83 74 - 99 mg/dL    Sodium 139 136 - 145 mmol/L    Potassium 3.8 3.5 - 5.3 mmol/L    Chloride 101 98 - 107 mmol/L    Bicarbonate 34 (H) 21 - 32 mmol/L    Anion Gap 8 (L) 10 - 20 mmol/L    Urea Nitrogen 13 6 - 23 mg/dL    Creatinine 0.78 0.50 - 1.05 mg/dL    eGFR 77 >60 mL/min/1.73m*2    Calcium 8.5 (L) 8.6 - 10.3 mg/dL   Magnesium    Result Value Ref Range    Magnesium 2.03 1.60 - 2.40 mg/dL   Protime-INR   Result Value Ref Range    Protime 28.7 (H) 9.8 - 12.8 seconds    INR 2.5 (H) 0.9 - 1.1   CBC and Auto Differential   Result Value Ref Range    WBC 6.1 4.4 - 11.3 x10*3/uL    nRBC 0.0 0.0 - 0.0 /100 WBCs    RBC 4.68 4.00 - 5.20 x10*6/uL    Hemoglobin 11.9 (L) 12.0 - 16.0 g/dL    Hematocrit 40.1 36.0 - 46.0 %    MCV 86 80 - 100 fL    MCH 25.4 (L) 26.0 - 34.0 pg    MCHC 29.7 (L) 32.0 - 36.0 g/dL    RDW 14.5 11.5 - 14.5 %    Platelets 189 150 - 450 x10*3/uL    Neutrophils % 72.2 40.0 - 80.0 %    Immature Granulocytes %, Automated 0.2 0.0 - 0.9 %    Lymphocytes % 12.4 13.0 - 44.0 %    Monocytes % 10.3 2.0 - 10.0 %    Eosinophils % 4.4 0.0 - 6.0 %    Basophils % 0.5 0.0 - 2.0 %    Neutrophils Absolute 4.42 1.60 - 5.50 x10*3/uL    Immature Granulocytes Absolute, Automated 0.01 0.00 - 0.50 x10*3/uL    Lymphocytes Absolute 0.76 (L) 0.80 - 3.00 x10*3/uL    Monocytes Absolute 0.63 0.05 - 0.80 x10*3/uL    Eosinophils Absolute 0.27 0.00 - 0.40 x10*3/uL    Basophils Absolute 0.03 0.00 - 0.10 x10*3/uL   Basic Metabolic Panel   Result Value Ref Range    Glucose 104 (H) 74 - 99 mg/dL    Sodium 138 136 - 145 mmol/L    Potassium 3.6 3.5 - 5.3 mmol/L    Chloride 93 (L) 98 - 107 mmol/L    Bicarbonate 37 (H) 21 - 32 mmol/L    Anion Gap 12 10 - 20 mmol/L    Urea Nitrogen 20 6 - 23 mg/dL    Creatinine 0.90 0.50 - 1.05 mg/dL    eGFR 65 >60 mL/min/1.73m*2    Calcium 9.5 8.6 - 10.3 mg/dL   Protime-INR   Result Value Ref Range    Protime 24.9 (H) 9.8 - 12.8 seconds    INR 2.2 (H) 0.9 - 1.1   CBC and Auto Differential   Result Value Ref Range    WBC 6.9 4.4 - 11.3 x10*3/uL    nRBC 0.0 0.0 - 0.0 /100 WBCs    RBC 4.59 4.00 - 5.20 x10*6/uL    Hemoglobin 12.0 12.0 - 16.0 g/dL    Hematocrit 40.0 36.0 - 46.0 %    MCV 87 80 - 100 fL    MCH 26.1 26.0 - 34.0 pg    MCHC 30.0 (L) 32.0 - 36.0 g/dL    RDW 14.6 (H) 11.5 - 14.5 %    Platelets 224 150 - 450 x10*3/uL    Neutrophils %  74.5 40.0 - 80.0 %    Immature Granulocytes %, Automated 0.4 0.0 - 0.9 %    Lymphocytes % 11.2 13.0 - 44.0 %    Monocytes % 9.3 2.0 - 10.0 %    Eosinophils % 4.3 0.0 - 6.0 %    Basophils % 0.3 0.0 - 2.0 %    Neutrophils Absolute 5.14 1.60 - 5.50 x10*3/uL    Immature Granulocytes Absolute, Automated 0.03 0.00 - 0.50 x10*3/uL    Lymphocytes Absolute 0.77 (L) 0.80 - 3.00 x10*3/uL    Monocytes Absolute 0.64 0.05 - 0.80 x10*3/uL    Eosinophils Absolute 0.30 0.00 - 0.40 x10*3/uL    Basophils Absolute 0.02 0.00 - 0.10 x10*3/uL   Basic Metabolic Panel   Result Value Ref Range    Glucose 108 (H) 74 - 99 mg/dL    Sodium 137 136 - 145 mmol/L    Potassium 3.7 3.5 - 5.3 mmol/L    Chloride 91 (L) 98 - 107 mmol/L    Bicarbonate 37 (H) 21 - 32 mmol/L    Anion Gap 13 10 - 20 mmol/L    Urea Nitrogen 30 (H) 6 - 23 mg/dL    Creatinine 1.19 (H) 0.50 - 1.05 mg/dL    eGFR 46 (L) >60 mL/min/1.73m*2    Calcium 9.6 8.6 - 10.3 mg/dL       Assessment/Plan   This patient currently has cardiac telemetry ordered; if you would like to modify or discontinue the telemetry order, click here to go to the orders activity to modify/discontinue the order.              Assessment & Plan  Acute hypoxic respiratory failure (Multi)      1. Acute hypoxic respiratory failure (Multi)      continue oxygen therapy, wean as possible, manage underlying CHF and RSV infection      2. RSV bronchiolitis      supportive care, steroids and bronchodilator. add empirical Doxycycline for bacterial infection and add Breo ellipta for wheezing.      3. Acute on chronic congestive heart failure, unspecified heart failure type  Transthoracic Echo (TTE) Complete    Transthoracic Echo (TTE) Complete    DC'd Zaroxolyn and suspended Lasix      4. Elevated troponin  Transthoracic Echo (TTE) Complete    Transthoracic Echo (TTE) Complete    partially 2/2 pulmonary conditions, no evidence of ACS      5. ALONSO (dyspnea on exertion)  Transthoracic Echo (TTE) Complete    Transthoracic Echo  (TTE) Complete      6. Primary hypertension      BP stable. add norvasc 5 mg for better control      7. Obesity, Class I, BMI 30-34.9        8. Permanent atrial fibrillation (Multi)      HR 60 s/p pacemaker      9. Sick sinus syndrome (Multi)      s/p pacemaker, HR 60, no beta blocker per outpatient management               Spent 35 minutes in the follow-up management of this patient today       Richie Cassidy MD

## 2025-01-08 NOTE — PROGRESS NOTES
History Of Present Illness:    Bethany Espinoza is a 79 y.o. female with PMHx s/f HTN, HLD, CAD, permanent atrial fibrillation on warfarin, SSS s/p PPM, chronic diastolic heart failure, mild pHTN, MS with MR, PVD, progressive supranuclear palsy (predominantly immobile; also has issues with dysphagia), obesity, presenting with SOB, cough, congestion, and worsening weakness. In short, pt was in her typical state of health until Monday (12/30/24) when she became congested and more short of breath than usual.  Patient and family was concerned this may have been viral or sinus infection as her grandson had visited and was just getting over RSV. Patient progressively worsened and patient came to hospital.  She saw the PCP in the interim and was prescribed with antibiotic.  Subsequently patient continued to deteriorate with worsening shortness of breath and eventually came to hospital.  She is compliant with her medical therapy.  She does not recall all of her medications though.        Outside cardiology records were reviewed.  Mrs Espinoza has a history of sick sinus syndrome and underwent a dual-chamber pacemaker implantation in 2009. At some point since her pacer implantation, she developed right ventricular pacemaker lead malfunction and this required lead replacement in February 2011. Around the same time, she became symptomatic with atrial fibrillation and required sotalol. She was later switched to Dofetilide. She developed of lead infection in 2011 and a pacemaker was removed. Subsequently, in December 2015, she underwent a single-chamber pacemaker placement for suspicion of bradycardia-induced symptoms of congestive heart failure.   In October 2018, she underwent coronary angiography which did not reveal significant coronary artery disease, but she had moderate to moderately severe mitral regurgitation. She subsequently underwent a transesophageal echocardiogram by me in November 2018, and this revealed that she  only had mild mitral regurgitation (Vena contract a measured 3 mm, the regurgitant orifice area by the proximal isovelocity hemispheric surface area method was 0.17 cm2).  She had a bout of congestive heart failure exacerbation in May 2020. Apparently she was prescribed metolazone for 3 days by her primary care provider. She said that she lost 11 pounds of water weight after she did that. She was also prescribed potassium with the Lasix. Her echocardiogram in 9/21 revealed an LV ejection fraction of 55%, normal right ventricular systolic function, with a dilated right ventricle. She has severe biatrial enlargement, mild mitral, mild to moderate tricuspid regurgitation.    She is currently taking Lasix 20 mg every day, along with metolazone only as necessary on Mondays Wednesdays and Fridays, if she has a weight gain of greater than 3 pounds in a day.     Subjective Data:  Patient denies chest pain she continues to be short of breath.  No pitting edema.  Asked x-ray did show pulmonary vascular congestion.  1-6-25: Remains with dyspnea and very wheezy today.  No CP/pressure. Monitor: Afib with V pacing.  K 3.58, creatinine 0.78, INR 3.6. EF 55-60%  1/7/25: Seen with daughter at bedside. Continues to have ALONSO such as turning/repositioning in bed. On 4L NC and she did not wear oxygen prior to admission.  Atrial flutter/Vpaced on telemetry with HR 60 bpm.  1-8-25: Still with dyspnea but breathing appears to be easier today. O2 down to 2 lpm.  No CP/pressure.  Monitor: Aflutter with Vpacing. Creatinine 1.19, INR 2.2    Overnight Events:    None     Objective Data:  Last Recorded Vitals:  Vitals:    01/07/25 2116 01/08/25 0029 01/08/25 0539 01/08/25 0700   BP: 108/69 113/72 121/75 111/75   BP Location: Left arm Left arm Left arm Right arm   Patient Position: Lying Lying Lying Lying   Pulse: 61 60 61 61   Resp: 24 23  24   Temp: 36.7 °C (98.1 °F) 36.2 °C (97.1 °F) 36.3 °C (97.3 °F) 36.2 °C (97.1 °F)   TempSrc: Temporal  Temporal Temporal Temporal   SpO2: 90% 93% 95% 96%   Weight:   78.5 kg (173 lb 1 oz)    Height:           Last Labs:      Results from last 7 days   Lab Units 01/08/25 0429 01/07/25 0412 01/06/25  0519   SODIUM mmol/L 137 138 139   POTASSIUM mmol/L 3.7 3.6 3.8   CHLORIDE mmol/L 91* 93* 101   CO2 mmol/L 37* 37* 34*   BUN mg/dL 30* 20 13   CREATININE mg/dL 1.19* 0.90 0.78   GLUCOSE mg/dL 108* 104* 83   CALCIUM mg/dL 9.6 9.5 8.5*      Results from last 7 days   Lab Units 01/08/25  0429 01/07/25  0412 01/05/25  0428   WBC AUTO x10*3/uL 6.9 6.1 6.5   HEMOGLOBIN g/dL 12.0 11.9* 10.5*   HEMATOCRIT % 40.0 40.1 34.6*   PLATELETS AUTO x10*3/uL 224 189 141*      Results from last 7 days   Lab Units 01/06/25  0519   MAGNESIUM mg/dL 2.03      TROPHS   Date/Time Value Ref Range Status   01/03/2025 07:42 PM 55 0 - 13 ng/L Final     Comment:     Previous result verified on 1/3/2025 1516 on specimen/case [id number] called with component [employer] for procedure Troponin I, High Sensitivity, Initial with value 66 ng/L.   01/03/2025 05:43 PM 57 0 - 13 ng/L Final     Comment:     Previous result verified on 1/3/2025 1516 on specimen/case [id number] called with component [employer] for procedure Troponin I, High Sensitivity, Initial with value 66 ng/L.   01/03/2025 03:23 PM 70 0 - 13 ng/L Final     Comment:     Previous result verified on 1/3/2025 1516 on specimen/case [id number] called with component [employer] for procedure Troponin I, High Sensitivity, Initial with value 66 ng/L.     BNP   Date/Time Value Ref Range Status   01/03/2025 02:36  0 - 99 pg/mL Final   10/17/2023 11:22  0 - 99 pg/mL Final      Last I/O:  I/O last 3 completed shifts:  In: 440 (5.6 mL/kg) [P.O.:440]  Out: 1825 (23.2 mL/kg) [Urine:1825 (0.6 mL/kg/hr)]  Weight: 78.5 kg     Past Cardiology Tests (Last 3 Years):    Echo:  Transthoracic Echo (TTE) Complete 01/04/2025  PHYSICIAN INTERPRETATION:  Left Ventricle: The left ventricular systolic function  is normal, with a visually estimated ejection fraction of 55-60%. There are no regional wall motion abnormalities. The left ventricular cavity size is upper limits of normal. There is normal septal and normal posterior left ventricular wall thickness. Left ventricular diastolic filling was indeterminate.  Left Atrium: The left atrium is moderately dilated. A bubble study using agitated saline was performed. Bubble study is negative.  Right Ventricle: The right ventricle is moderately enlarged. There is mildly reduced right ventricular systolic function. A device is visualized in the right ventricle.  Right Atrium: The right atrium is mildly dilated. There is a device visualized in the right atrium.  Aortic Valve: The aortic valve is trileaflet. There is mild aortic valve cusp calcification. The aortic valve dimensionless index is 0.56. There is no evidence of aortic valve regurgitation. The peak instantaneous gradient of the aortic valve is 8 mmHg. The mean gradient of the aortic valve is 5 mmHg.  Mitral Valve: The mitral valve is normal in structure. There is mild mitral annular calcification. There is trace mitral valve regurgitation.  Tricuspid Valve: The tricuspid valve is structurally normal. There is moderate tricuspid regurgitation. The Doppler estimated RVSP is mildly elevated right ventricular systolic pressure at 45.0 mmHg.  Pulmonic Valve: The pulmonic valve is structurally normal. There is physiologic pulmonic valve regurgitation.  Pericardium: Trivial pericardial effusion.  Aorta: The aortic root is normal. The ascending aorta was not well visualized.  Systemic Veins: The inferior vena cava appears normal in size, with IVC inspiratory collapse greater than 50%.  Ejection Fractions:  EF   Date/Time Value Ref Range Status   01/04/2025 02:34 PM 58 %          Inpatient Medications:  Scheduled medications   Medication Dose Route Frequency    amLODIPine  5 mg oral Daily    atorvastatin  10 mg oral Daily     azelastine  1 spray Each Nostril BID    donepezil  10 mg oral Nightly    doxycycline  100 mg oral q12h DAQUAN    fluticasone  2 spray Each Nostril Daily    fluticasone furoate-vilanteroL  1 puff inhalation Daily    furosemide  40 mg intravenous BID    guaiFENesin  1,200 mg oral BID    metOLazone  2.5 mg oral Daily    nystatin  1 Application Topical BID    oxygen   inhalation Continuous - Inhalation    pantoprazole  40 mg oral Daily before breakfast    Or    pantoprazole  40 mg intravenous Daily before breakfast    polyethylene glycol  17 g oral Daily    potassium chloride CR  20 mEq oral BID     PRN medications   Medication    acetaminophen    albuterol    alteplase    benzocaine-menthol    bisacodyl    bisacodyl    heparin flush    heparin flush    ipratropium-albuteroL    melatonin    ondansetron    Or    ondansetron     Continuous Medications   Medication Dose Last Rate       Physical Exam:  Alert but tired today.    No JVD noted.  Lungs with better air movement, few wheezes, O2 at 2lpm  S1-S2 is regular I do not appreciate a murmur. V paced rhythm.  Abdomen is soft with normal bowel sounds  LE's with chronic vascular skin changes. No BLE edema  Flat affect.     Assessment/Plan   Acute decompensated heart failure-diastolic heart failure  Acute hypoxic failure  RSV infection/pneumonia  Abnormal troponin elevation  Essential hypertension  Hyperlipidemia  Permanent atrial fibrillation  Sick sinus syndrome status post PPM  Progressive supranuclear palsy     Plan  -Suspect acute decompensation in the setting of underlying viral illness.  -Abnormal troponin elevation not suggestive of ACS.  This is likely related to underlying CHF/atypical pneumonia.  Discontinue checking when downtrended.  -Obtain TTE to evaluate for any new structural abnormalities.  -Suggest Lasix 40 mg IV twice daily.  -Suggest giving metolazone 2.5 mg daily at this time and when clinical status is stable we could then resume 3 times a week as needed  as previously taking.  -Continue Coumadin for anticoagulation.  -Resume home metoprolol.  -Add low-dose losartan for better blood pressure control.  -Management of RSV and other medical condition as per primary team.  -Telemetry monitoring.  Daily weight.  Strict I's and O's.  -We will consider cardioversion for a flutter if clinical status does not improve.     Cardiology will follow please call with any questions.    Acute on chronic HFpEF - decompensation in the setting of pneumonia  Echocardiogram shows preserved EF, no RWMA, moderate TR and RVSP 45 mm hg. she continues to have symptoms of congestion will continue IV Lasix.  Renal function is stable.  Potassium is low.  Continue IV Lasix.   Will increase IV diuretic dose and monitor.   BMP/Mag in a.m.  1-6-25:  Still with dyspnea and cough.  Wheezes and crackles noted.  Would continue on IV Lasix and metolazone for now. BNP elevated.   1/7/25:  Continues to have ALONSO/cough.  Expiratory wheezing noted on exam. No crackles.  Renal function is stable.  Urine in collection bucket is melisa.  Will continue IV furosemide and metolazone for now as her renal function is stable.  Continue to monitor renal function and electrolytes while diuresing.   1-8-25:  Still with dyspnea but improving.  + cough.  No crackles noted.  Patient may be getting dry based on exam.  Will reduce Lasix dose, d/c Zaroxolyn and reevaluated tomorrow. Creatinine 1.19. BMP and BNP tomorrow.     2. Elevated Troponin:  trending down.  Not suggestive of ACS.  No RWMA on echocardiogram.   1-6-25: No CP/pressure. Troponins were flat.   1/7/25: Echo with normal LVEF.  Denies any chest pain/pressure.   1-8-25: No CP/pressure.  Medical tx.     3. Chronic atrial fibrillation:  Telemetry is showing ventricular paced rhythm today. Chronic OAC with coumdin. INR 2.0-3.0. EKG shows atrial flutter with ventricular pacing.   HR is controlled currently.   1-6-25: Afib with Vpacing on tele.  IMS to follow warfarin,  INR 3.6 today.  1/7/25: Atrial flutter on telemetry/Vpaced with HR 60 bpm.  INR 2.5. We will consider cardioversion for a flutter if clinical status does not improve.   1-8-25: Looks like Aflutter with Vpacing today. INR 2.2.  Could consider DCC in future.     4. RSV:  treatment per IMS.   1/7/25:  Expiratory wheezing noted on exam.  Discussed with RN and she will reach out to hospitalist for possible nebulizer/respiratory treatments.     5. Hypokalemia:   Will give additional potassium supplement today.  Recheck lab in a.m.   1-6-25: K stable at 3.8  1/7/25: Serum potassium 3.6  1-8-25: K 3.7    Code Status:  Full Code        Ruchi Pruitt, APRN-CNP

## 2025-01-08 NOTE — CARE PLAN
The patient's goals for the shift include Pt. will have a safe, restful and uneventful evening    The clinical goals for the shift include Pt will remain hemodynamically stable

## 2025-01-08 NOTE — CARE PLAN
The patient's goals for the shift include stay informed.    The clinical goals for the shift include Patient spo2 will remain >90% throughout the shift.    Over the shift, the patient did make progress toward the following goals. Barriers to progression include understanding. Recommendations to address these barriers include education.

## 2025-01-08 NOTE — PROGRESS NOTES
Physical Therapy  Physical Therapy Treatment    Patient Name: Bethany Espinoza  MRN: [id number]  Department: [address]  Room: 2026/2026-A  Today's Date: 1/8/2025  Time Calculation  Start Time: 1126  Stop Time: 1150  Time Calculation (min): 24 min    PT Plan  Treatment/Interventions: Bed mobility, Transfer training, Gait training, Neuromuscular re-education, Therapeutic exercise, Therapeutic activity  PT Plan: Ongoing PT  PT Frequency: 4 times per week  PT Discharge Recommendations: Moderate intensity level of continued care  PT Recommended Transfer Status: Assist x2  PT - OK to Discharge: Yes    General Visit Information:   PT  Visit  PT Received On: 01/08/25  Response to Previous Treatment: Patient with no complaints from previous session.  General  Reason for Referral: acute hypoxic resp. failure, RSV, weakness    Precautions:  Falls  Contact + precautions    Pain:  No pain    Cognition:  Within Functional Limits    Activity Tolerance:  Activity Tolerance  Endurance: Tolerates 10 - 20 min exercise with multiple rests    Treatments:  Therapeutic Exercise  6 minute sit EOB, Vicki     x1 rep sit<>stand, modA    Bed Mobility  Supine to sitting: Maximum assistance  Sitting to supine: Maximum assistance  Scooting: Maximum assistance    Transfers  Sit to stand: Moderate assistance  Stand to sit: Moderate assistance        Pt in bed pre/post tx. Alarm on and call light in reach.        Outcome Measures:  [facility] Basic Mobility  Turning from your back to your side while in a flat bed without using bedrails: A lot  Moving from lying on your back to sitting on the side of a flat bed without using bedrails: A lot  Moving to and from bed to chair (including a wheelchair): A lot  Standing up from a chair using your arms (e.g. wheelchair or bedside chair): A lot  To walk in hospital room: A lot  Climbing 3-5 steps with railing: Total  Basic Mobility - Total Score: 11    Education Documentation  Mobility Training, taught by Alexey FRAZIER  BROOK Kennedy at 1/8/2025 12:32 PM.  Learner: Patient  Readiness: Acceptance  Method: Explanation, Demonstration  Response: Needs Reinforcement    Education Comments  No comments found.        OP EDUCATION:       Encounter Problems       Encounter Problems (Active)       Mobility       STG - Patient will ambulate household distance using walker and no more than SB assist (Progressing)       Start:  01/06/25    Expected End:  01/20/25               PT Transfers       STG - Transfer from bed to chair/BSC using walker and no more than SB assist (Progressing)       Start:  01/06/25    Expected End:  01/20/25               Pain - Adult          Strengthening        Pt will perform 10+ reps AAROM/AROM/RROM BLE to improve functional strength needed for improved mobility.  (Progressing)       Start:  01/06/25    Expected End:  01/20/25

## 2025-01-08 NOTE — PROGRESS NOTES
Pharmacy Consult for Warfarin (Coumadin) Management - Daily Progress Note     Labs  INR   Date Value Ref Range Status   01/08/2025 2.2 (H) 0.9 - 1.1 Final   01/07/2025 2.5 (H) 0.9 - 1.1 Final   01/06/2025 3.6 (H) 0.9 - 1.1 Final     Review  Warfarin Indication: Atrial fibrillation or flutter  Target INR: 2 - 3     Eating ~50-75% of meals, still on doxy, reduced dose of 1mg today.  Orders placed per pharmacy consult. Pharmacy will continue to monitor and adjust therapy as needed.   Naman Cai, PharmD

## 2025-01-09 ENCOUNTER — APPOINTMENT (OUTPATIENT)
Dept: RADIOLOGY | Facility: HOSPITAL | Age: 80
End: 2025-01-09
Payer: MEDICARE

## 2025-01-09 LAB
ANION GAP SERPL CALC-SCNC: 13 MMOL/L (ref 10–20)
BNP SERPL-MCNC: 96 PG/ML (ref 0–99)
BUN SERPL-MCNC: 39 MG/DL (ref 6–23)
CALCIUM SERPL-MCNC: 9.5 MG/DL (ref 8.6–10.3)
CHLORIDE SERPL-SCNC: 92 MMOL/L (ref 98–107)
CO2 SERPL-SCNC: 35 MMOL/L (ref 21–32)
CREAT SERPL-MCNC: 1.24 MG/DL (ref 0.5–1.05)
EGFRCR SERPLBLD CKD-EPI 2021: 44 ML/MIN/1.73M*2
GLUCOSE SERPL-MCNC: 96 MG/DL (ref 74–99)
INR PPP: 2 (ref 0.9–1.1)
POTASSIUM SERPL-SCNC: 3.3 MMOL/L (ref 3.5–5.3)
PROTHROMBIN TIME: 22.5 SECONDS (ref 9.8–12.8)
SODIUM SERPL-SCNC: 137 MMOL/L (ref 136–145)

## 2025-01-09 PROCEDURE — 97530 THERAPEUTIC ACTIVITIES: CPT | Mod: GO,CO

## 2025-01-09 PROCEDURE — 94640 AIRWAY INHALATION TREATMENT: CPT

## 2025-01-09 PROCEDURE — 99233 SBSQ HOSP IP/OBS HIGH 50: CPT | Performed by: INTERNAL MEDICINE

## 2025-01-09 PROCEDURE — 2500000002 HC RX 250 W HCPCS SELF ADMINISTERED DRUGS (ALT 637 FOR MEDICARE OP, ALT 636 FOR OP/ED): Performed by: NURSE PRACTITIONER

## 2025-01-09 PROCEDURE — 97530 THERAPEUTIC ACTIVITIES: CPT | Mod: CQ,GP

## 2025-01-09 PROCEDURE — 2500000001 HC RX 250 WO HCPCS SELF ADMINISTERED DRUGS (ALT 637 FOR MEDICARE OP): Performed by: STUDENT IN AN ORGANIZED HEALTH CARE EDUCATION/TRAINING PROGRAM

## 2025-01-09 PROCEDURE — 2500000001 HC RX 250 WO HCPCS SELF ADMINISTERED DRUGS (ALT 637 FOR MEDICARE OP): Performed by: INTERNAL MEDICINE

## 2025-01-09 PROCEDURE — 71250 CT THORAX DX C-: CPT

## 2025-01-09 PROCEDURE — 92526 ORAL FUNCTION THERAPY: CPT | Mod: GN

## 2025-01-09 PROCEDURE — 2500000004 HC RX 250 GENERAL PHARMACY W/ HCPCS (ALT 636 FOR OP/ED): Performed by: INTERNAL MEDICINE

## 2025-01-09 PROCEDURE — 71250 CT THORAX DX C-: CPT | Performed by: RADIOLOGY

## 2025-01-09 PROCEDURE — 85610 PROTHROMBIN TIME: CPT | Performed by: STUDENT IN AN ORGANIZED HEALTH CARE EDUCATION/TRAINING PROGRAM

## 2025-01-09 PROCEDURE — 2500000002 HC RX 250 W HCPCS SELF ADMINISTERED DRUGS (ALT 637 FOR MEDICARE OP, ALT 636 FOR OP/ED): Performed by: INTERNAL MEDICINE

## 2025-01-09 PROCEDURE — 80048 BASIC METABOLIC PNL TOTAL CA: CPT | Performed by: NURSE PRACTITIONER

## 2025-01-09 PROCEDURE — 99232 SBSQ HOSP IP/OBS MODERATE 35: CPT | Performed by: NURSE PRACTITIONER

## 2025-01-09 PROCEDURE — 83880 ASSAY OF NATRIURETIC PEPTIDE: CPT | Performed by: NURSE PRACTITIONER

## 2025-01-09 PROCEDURE — 2500000005 HC RX 250 GENERAL PHARMACY W/O HCPCS: Performed by: STUDENT IN AN ORGANIZED HEALTH CARE EDUCATION/TRAINING PROGRAM

## 2025-01-09 PROCEDURE — 2060000001 HC INTERMEDIATE ICU ROOM DAILY

## 2025-01-09 PROCEDURE — 36415 COLL VENOUS BLD VENIPUNCTURE: CPT | Performed by: NURSE PRACTITIONER

## 2025-01-09 RX ORDER — WARFARIN 2 MG/1
2 TABLET ORAL ONCE
Status: COMPLETED | OUTPATIENT
Start: 2025-01-09 | End: 2025-01-09

## 2025-01-09 RX ORDER — WARFARIN 3 MG/1
1.5 TABLET ORAL ONCE
Status: DISCONTINUED | OUTPATIENT
Start: 2025-01-09 | End: 2025-01-09

## 2025-01-09 RX ORDER — FLUTICASONE PROPIONATE 50 MCG
2 SPRAY, SUSPENSION (ML) NASAL DAILY
Status: DISCONTINUED | OUTPATIENT
Start: 2025-01-09 | End: 2025-01-09

## 2025-01-09 RX ADMIN — ATORVASTATIN CALCIUM 10 MG: 10 TABLET, FILM COATED ORAL at 08:38

## 2025-01-09 RX ADMIN — WARFARIN SODIUM 2 MG: 2 TABLET ORAL at 17:12

## 2025-01-09 RX ADMIN — GUAIFENESIN 1200 MG: 600 TABLET ORAL at 08:38

## 2025-01-09 RX ADMIN — PANTOPRAZOLE SODIUM 40 MG: 40 TABLET, DELAYED RELEASE ORAL at 06:18

## 2025-01-09 RX ADMIN — IPRATROPIUM BROMIDE AND ALBUTEROL SULFATE 3 ML: 2.5; .5 SOLUTION RESPIRATORY (INHALATION) at 11:49

## 2025-01-09 RX ADMIN — IPRATROPIUM BROMIDE AND ALBUTEROL SULFATE 3 ML: 2.5; .5 SOLUTION RESPIRATORY (INHALATION) at 08:35

## 2025-01-09 RX ADMIN — IPRATROPIUM BROMIDE AND ALBUTEROL SULFATE 3 ML: 2.5; .5 SOLUTION RESPIRATORY (INHALATION) at 21:31

## 2025-01-09 RX ADMIN — AMLODIPINE BESYLATE 5 MG: 5 TABLET ORAL at 08:38

## 2025-01-09 RX ADMIN — POTASSIUM CHLORIDE 20 MEQ: 1500 TABLET, EXTENDED RELEASE ORAL at 21:35

## 2025-01-09 RX ADMIN — DOXYCYCLINE 100 MG: 100 CAPSULE ORAL at 21:35

## 2025-01-09 RX ADMIN — FLUTICASONE FUROATE AND VILANTEROL TRIFENATATE 1 PUFF: 200; 25 POWDER RESPIRATORY (INHALATION) at 14:42

## 2025-01-09 RX ADMIN — DONEPEZIL HYDROCHLORIDE 10 MG: 5 TABLET ORAL at 21:35

## 2025-01-09 RX ADMIN — DOXYCYCLINE 100 MG: 100 CAPSULE ORAL at 08:38

## 2025-01-09 RX ADMIN — FLUTICASONE PROPIONATE 2 SPRAY: 50 SPRAY, METERED NASAL at 08:38

## 2025-01-09 RX ADMIN — NYSTATIN 1 APPLICATION: 100000 POWDER TOPICAL at 21:34

## 2025-01-09 RX ADMIN — NYSTATIN 1 APPLICATION: 100000 POWDER TOPICAL at 08:38

## 2025-01-09 RX ADMIN — AZELASTINE HYDROCHLORIDE 1 SPRAY: 137 SPRAY, METERED NASAL at 21:35

## 2025-01-09 RX ADMIN — Medication 1 L/MIN: at 21:33

## 2025-01-09 RX ADMIN — AZELASTINE HYDROCHLORIDE 1 SPRAY: 137 SPRAY, METERED NASAL at 08:38

## 2025-01-09 RX ADMIN — POTASSIUM CHLORIDE 20 MEQ: 1500 TABLET, EXTENDED RELEASE ORAL at 08:38

## 2025-01-09 RX ADMIN — GUAIFENESIN 1200 MG: 600 TABLET ORAL at 21:35

## 2025-01-09 RX ADMIN — Medication 2 L/MIN: at 08:35

## 2025-01-09 ASSESSMENT — COGNITIVE AND FUNCTIONAL STATUS - GENERAL
HELP NEEDED FOR BATHING: A LOT
DRESSING REGULAR LOWER BODY CLOTHING: TOTAL
DAILY ACTIVITIY SCORE: 12
CLIMB 3 TO 5 STEPS WITH RAILING: TOTAL
WALKING IN HOSPITAL ROOM: TOTAL
MOBILITY SCORE: 11
DRESSING REGULAR UPPER BODY CLOTHING: A LOT
MOBILITY SCORE: 11
MOVING FROM LYING ON BACK TO SITTING ON SIDE OF FLAT BED WITH BEDRAILS: A LITTLE
TOILETING: A LOT
PERSONAL GROOMING: A LOT
HELP NEEDED FOR BATHING: A LOT
EATING MEALS: A LOT
STANDING UP FROM CHAIR USING ARMS: A LOT
STANDING UP FROM CHAIR USING ARMS: A LOT
DRESSING REGULAR UPPER BODY CLOTHING: A LOT
CLIMB 3 TO 5 STEPS WITH RAILING: TOTAL
MOVING TO AND FROM BED TO CHAIR: A LOT
MOVING FROM LYING ON BACK TO SITTING ON SIDE OF FLAT BED WITH BEDRAILS: A LOT
PERSONAL GROOMING: A LOT
WALKING IN HOSPITAL ROOM: A LOT
DAILY ACTIVITIY SCORE: 10
DRESSING REGULAR LOWER BODY CLOTHING: A LOT
EATING MEALS: A LOT
TURNING FROM BACK TO SIDE WHILE IN FLAT BAD: A LOT
TOILETING: TOTAL
MOVING TO AND FROM BED TO CHAIR: A LOT
TURNING FROM BACK TO SIDE WHILE IN FLAT BAD: A LOT

## 2025-01-09 ASSESSMENT — PAIN - FUNCTIONAL ASSESSMENT
PAIN_FUNCTIONAL_ASSESSMENT: 0-10

## 2025-01-09 ASSESSMENT — PAIN SCALES - GENERAL
PAINLEVEL_OUTOF10: 0 - NO PAIN

## 2025-01-09 NOTE — PROGRESS NOTES
"Bethany Espinoza is a 79 y.o. female on day 5 of admission presenting with Acute hypoxic respiratory failure (Multi).      Subjective   Bethany Espinoza is a 79 y.o. female with PMHx s/f HTN, HLD, CAD, Afib/flutter on warfarin, SSS s/p PPM, chronic diastolic heart failure, pHTN, valvular heart disease, PVD, progressive supranuclear palsy (predominantly immobile; also has issues with dysphagia), obesity, presenting with SOB, cough, congestion, and worsening weakness. Hx mostly obtained from her daughter at the bedside. In short, pt was in her typical state of health until Monday (12/30/24) when she became congested. Was concerned this may have been RSV or a sinus infection as her grandson had visited and was just getting over RSV. She went to see her PCP and was prescribed decongestants and Augmentin 12/31/24 to help with symptoms; tested negative for Flu, COVID, and ?RSV at that time. Over the next few days, daughter called in to check on her; until day of admission (01/03/2025), patient had reported that she had been doing okay, but today she informed her daughter that she was feeling unwell and needed assistance. When her daughter arrived, she found the patient to sound like she was \"drowning\" in her fluid around her lungs which has happened with prior issues with her heart failure, additionally she had been so weak that her patient's  was unable to help get her up out of bed to go to the bathroom and she did end up soiling herself. Patient reports that she has felt nauseous and has had diarrhea x 1 day as well, does not appear to have missed any medications in terms of heart failure; however, has just been generally unwell. Denies cp/pressure, palpitations, diaphoresis, dizziness / lightheadedness, syncope or near syncope, HA, vision changes, f/c, abd pain. Has chronic baseline lower extremity edema which is unchanged times many years per daughter at the bedside.     ED Course (Summary - please note all " labs, imaging studies, and interventions noted below have been personally reviewed and/or interpreted on day of admission):   Vitals on presentation: T98.2, /83, HR 60, RR 30 (most recently 24), SpO2 96% 2 L nasal cannula  Labs: CBC with WBC 5.2, Hgb 10.8, platelets 149.  CMP with glucose 121, sodium 138, potassium 4.3, BUN 17, serum creatinine 0.94.  Lactate 1.6.  .  High-sensitivity troponin 95-64-pjyoxf on admission 57 and 55.  PT/INR: 22.4/2.8.  RSV positive; flu A, B, COVID PCR all negative.  VBG relatively unrevealing.  UA without evidence of infection.  EKG: Sinus with short AK; right bundle branch block which has been previously seen on prior EKGs as well, no overt acute ischemic changes compared to prior  Imaging: CXR with moderate cardiomegaly, vascular congestion  Interventions: No medications given in the ED; cardiology was consulted for elevated troponin and they felt given the EKG stability compared to prior there was no need for heparinization unless the patient was not therapeutic with her warfarin.  Admitted to medicine for further management     12-point ROS reviewed and found to be negative aside from aforementioned positives in HPI and/or noted in dedicated ROS section below.      1/4/2025: Patient c/o shortness of breath. She remained on NC oxygen 4 L. Cardiology note appreciated. Add Norvasc and Zaroxolyn. Due to HR 60, no home meds of metoprolol listed.     1/5/2025: Patient remained shortness of breath and wheezing. She is on NC oxygen 3.5 L. Breo ellipta ordered for wheezing. Add empirical Doxycyclione for possible bacterial infection  1/6/2025: Patient was seen and examined.  Continues to complain of dry cough and some stuffy nose.  I we will add a humidifier to patient's oxygen.  Saturating 97% on 4 L will have weaned down to 3 and will continue weaning as tolerated.  Continue breathing treatment steroids and antibiotics.  Echocardiogram shows EF of 55 to 60% with dilated left  atrium and mildly elevated right ventricular systolic pressure.  Blood cultures are negative x 2 days.  INR still supratherapeutic at 3.6.  Repeat CBC and BMP in AM.  1/7/2025: Patient was seen and examined.  Now saturating well on 3 L nasal cannula oxygen.  Still complaining of cough and is severely deconditioned.  Bicarb level is at 37 today likely due to contraction alkalosis.  Will order 1 dose of acetazolamide IV.  PT recommended moderate intensity was patient is refusing skilled nursing facility.  INR is now therapeutic at 2.5 today.  Will resume warfarin today.  Pharmacy to dose.  Repeat CBC and BMP in AM.  1/8/2025: Patient was seen and examined.  Oxygen has been weaned down to 2 L nasal cannula oxygen today.  Patient showed bump in creatinine to 1.19 today.  Zaroxolyn has been discontinued.  She got 1 dose of Lasix this morning which I have suspended.  We will add chest physical therapy and scheduled DuoNebs today.  Respiratory therapist to add chest physical therapy.  INR still therapeutic at 2.2.  Continue warfarin at current dose.  Continue to trend CBC and BMP daily.  1/9/2025: Patient was seen and examined.  Oxygen has been weaned down to 1 L nasal cannula oxygen today.  Creatinine trended up slightly to 1.24 today.  INR was still therapeutic at 2.0.  Patient complains still of generalized weakness however refusing skilled nursing facility continue chest physical therapy and will continue to wean oxygen as tolerated.  Maintain warfarin dose.       Objective     Last Recorded Vitals  /77 (BP Location: Left arm, Patient Position: Lying)   Pulse 60   Temp 36.5 °C (97.7 °F) (Temporal)   Resp 24   Wt 78.7 kg (173 lb 8 oz)   SpO2 92%   Intake/Output last 3 Shifts:    Intake/Output Summary (Last 24 hours) at 1/9/2025 1259  Last data filed at 1/9/2025 0983  Gross per 24 hour   Intake 420 ml   Output 300 ml   Net 120 ml       Admission Weight  Weight: 81.6 kg (180 lb) (01/03/25 1403)    Daily  Weight  01/09/25 : 78.7 kg (173 lb 8 oz)    Image Results  Electrocardiogram, 12-lead PRN ACS symptoms  Ventricular-paced rhythm  underlying atrial flutter  Abnormal ECG  When compared with ECG of 03-JAN-2025 18:03,  PREVIOUS ECG IS PRESENT  Confirmed by Samuel Mclaughlin ([id number]) on 1/6/2025 3:24:57 PM  Electrocardiogram, 12-lead PRN ACS symptoms  Ventricular-paced rhythm  underlying atrial flutter  Abnormal ECG  When compared with ECG of 05-JAN-2025 12:13, (unconfirmed)  No significant change was found  Confirmed by Samuel Mclaughlin ([id number]) on 1/6/2025 3:24:32 PM  Electrocardiogram, 12-lead PRN ACS symptoms  Ventricular-paced rhythm  Abnormal ECG  When compared with ECG of 05-JAN-2025 12:00, (unconfirmed)  No significant change was found  Confirmed by Samuel Mclaughlin ([id number]) on 1/6/2025 3:24:41 PM  ECG 12 lead  Sinus rhythm  Short CO interval  Probable left atrial enlargement  Right bundle branch block  Left ventricular hypertrophy  Anterolateral infarct, age indeterminate  ECG 12 lead  Sinus rhythm  Short CO interval  IVCD, consider atypical RBBB  Anterolateral infarct, age indeterminate  Baseline wander in lead(s) V4,V6      Physical Exam  Constitutional:       General: She is in acute distress.      Appearance: She is ill-appearing.   HENT:      Head: Normocephalic.      Mouth/Throat:      Mouth: Mucous membranes are moist.      Pharynx: Oropharynx is clear.   Eyes:      Pupils: Pupils are equal, round, and reactive to light.   Cardiovascular:      Rate and Rhythm: Normal rate and regular rhythm.      Heart sounds: Normal heart sounds. No murmur heard.     No gallop.   Pulmonary:      Effort: Respiratory distress present.      Breath sounds: Wheezing and rales present.   Abdominal:      General: Bowel sounds are normal. There is no distension.      Palpations: Abdomen is soft.      Tenderness: There is no abdominal tenderness.   Musculoskeletal:         General: No swelling. Normal range of motion.       Cervical back: Neck supple. No rigidity.   Skin:     General: Skin is warm and dry.   Neurological:      General: No focal deficit present.      Mental Status: She is alert.      Cranial Nerves: No cranial nerve deficit.      Sensory: No sensory deficit.   Psychiatric:         Mood and Affect: Mood normal.         Behavior: Behavior normal.       Relevant Results             Scheduled medications  amLODIPine, 5 mg, oral, Daily  atorvastatin, 10 mg, oral, Daily  azelastine, 1 spray, Each Nostril, BID  donepezil, 10 mg, oral, Nightly  doxycycline, 100 mg, oral, q12h DAQUAN  fluticasone, 2 spray, Each Nostril, Daily  fluticasone furoate-vilanteroL, 1 puff, inhalation, Daily  [Held by provider] furosemide, 40 mg, intravenous, Daily  guaiFENesin, 1,200 mg, oral, BID  ipratropium-albuteroL, 3 mL, nebulization, TID  nystatin, 1 Application, Topical, BID  oxygen, , inhalation, Continuous - Inhalation  pantoprazole, 40 mg, oral, Daily before breakfast   Or  pantoprazole, 40 mg, intravenous, Daily before breakfast  polyethylene glycol, 17 g, oral, Daily  potassium chloride CR, 20 mEq, oral, BID  warfarin, 2 mg, oral, Once      Continuous medications     PRN medications  PRN medications: acetaminophen, albuterol, alteplase, benzocaine-menthol, bisacodyl, bisacodyl, heparin flush, heparin flush, ipratropium-albuteroL, melatonin, ondansetron **OR** ondansetron  Results for orders placed or performed during the hospital encounter of 01/03/25 (from the past 96 hours)   Protime-INR   Result Value Ref Range    Protime 41.0 (H) 9.8 - 12.8 seconds    INR 3.6 (H) 0.9 - 1.1   Basic Metabolic Panel   Result Value Ref Range    Glucose 83 74 - 99 mg/dL    Sodium 139 136 - 145 mmol/L    Potassium 3.8 3.5 - 5.3 mmol/L    Chloride 101 98 - 107 mmol/L    Bicarbonate 34 (H) 21 - 32 mmol/L    Anion Gap 8 (L) 10 - 20 mmol/L    Urea Nitrogen 13 6 - 23 mg/dL    Creatinine 0.78 0.50 - 1.05 mg/dL    eGFR 77 >60 mL/min/1.73m*2    Calcium 8.5 (L) 8.6 -  10.3 mg/dL   Magnesium   Result Value Ref Range    Magnesium 2.03 1.60 - 2.40 mg/dL   Protime-INR   Result Value Ref Range    Protime 28.7 (H) 9.8 - 12.8 seconds    INR 2.5 (H) 0.9 - 1.1   CBC and Auto Differential   Result Value Ref Range    WBC 6.1 4.4 - 11.3 x10*3/uL    nRBC 0.0 0.0 - 0.0 /100 WBCs    RBC 4.68 4.00 - 5.20 x10*6/uL    Hemoglobin 11.9 (L) 12.0 - 16.0 g/dL    Hematocrit 40.1 36.0 - 46.0 %    MCV 86 80 - 100 fL    MCH 25.4 (L) 26.0 - 34.0 pg    MCHC 29.7 (L) 32.0 - 36.0 g/dL    RDW 14.5 11.5 - 14.5 %    Platelets 189 150 - 450 x10*3/uL    Neutrophils % 72.2 40.0 - 80.0 %    Immature Granulocytes %, Automated 0.2 0.0 - 0.9 %    Lymphocytes % 12.4 13.0 - 44.0 %    Monocytes % 10.3 2.0 - 10.0 %    Eosinophils % 4.4 0.0 - 6.0 %    Basophils % 0.5 0.0 - 2.0 %    Neutrophils Absolute 4.42 1.60 - 5.50 x10*3/uL    Immature Granulocytes Absolute, Automated 0.01 0.00 - 0.50 x10*3/uL    Lymphocytes Absolute 0.76 (L) 0.80 - 3.00 x10*3/uL    Monocytes Absolute 0.63 0.05 - 0.80 x10*3/uL    Eosinophils Absolute 0.27 0.00 - 0.40 x10*3/uL    Basophils Absolute 0.03 0.00 - 0.10 x10*3/uL   Basic Metabolic Panel   Result Value Ref Range    Glucose 104 (H) 74 - 99 mg/dL    Sodium 138 136 - 145 mmol/L    Potassium 3.6 3.5 - 5.3 mmol/L    Chloride 93 (L) 98 - 107 mmol/L    Bicarbonate 37 (H) 21 - 32 mmol/L    Anion Gap 12 10 - 20 mmol/L    Urea Nitrogen 20 6 - 23 mg/dL    Creatinine 0.90 0.50 - 1.05 mg/dL    eGFR 65 >60 mL/min/1.73m*2    Calcium 9.5 8.6 - 10.3 mg/dL   Protime-INR   Result Value Ref Range    Protime 24.9 (H) 9.8 - 12.8 seconds    INR 2.2 (H) 0.9 - 1.1   CBC and Auto Differential   Result Value Ref Range    WBC 6.9 4.4 - 11.3 x10*3/uL    nRBC 0.0 0.0 - 0.0 /100 WBCs    RBC 4.59 4.00 - 5.20 x10*6/uL    Hemoglobin 12.0 12.0 - 16.0 g/dL    Hematocrit 40.0 36.0 - 46.0 %    MCV 87 80 - 100 fL    MCH 26.1 26.0 - 34.0 pg    MCHC 30.0 (L) 32.0 - 36.0 g/dL    RDW 14.6 (H) 11.5 - 14.5 %    Platelets 224 150 - 450  x10*3/uL    Neutrophils % 74.5 40.0 - 80.0 %    Immature Granulocytes %, Automated 0.4 0.0 - 0.9 %    Lymphocytes % 11.2 13.0 - 44.0 %    Monocytes % 9.3 2.0 - 10.0 %    Eosinophils % 4.3 0.0 - 6.0 %    Basophils % 0.3 0.0 - 2.0 %    Neutrophils Absolute 5.14 1.60 - 5.50 x10*3/uL    Immature Granulocytes Absolute, Automated 0.03 0.00 - 0.50 x10*3/uL    Lymphocytes Absolute 0.77 (L) 0.80 - 3.00 x10*3/uL    Monocytes Absolute 0.64 0.05 - 0.80 x10*3/uL    Eosinophils Absolute 0.30 0.00 - 0.40 x10*3/uL    Basophils Absolute 0.02 0.00 - 0.10 x10*3/uL   Basic Metabolic Panel   Result Value Ref Range    Glucose 108 (H) 74 - 99 mg/dL    Sodium 137 136 - 145 mmol/L    Potassium 3.7 3.5 - 5.3 mmol/L    Chloride 91 (L) 98 - 107 mmol/L    Bicarbonate 37 (H) 21 - 32 mmol/L    Anion Gap 13 10 - 20 mmol/L    Urea Nitrogen 30 (H) 6 - 23 mg/dL    Creatinine 1.19 (H) 0.50 - 1.05 mg/dL    eGFR 46 (L) >60 mL/min/1.73m*2    Calcium 9.6 8.6 - 10.3 mg/dL   Protime-INR   Result Value Ref Range    Protime 22.5 (H) 9.8 - 12.8 seconds    INR 2.0 (H) 0.9 - 1.1   Basic Metabolic Panel   Result Value Ref Range    Glucose 96 74 - 99 mg/dL    Sodium 137 136 - 145 mmol/L    Potassium 3.3 (L) 3.5 - 5.3 mmol/L    Chloride 92 (L) 98 - 107 mmol/L    Bicarbonate 35 (H) 21 - 32 mmol/L    Anion Gap 13 10 - 20 mmol/L    Urea Nitrogen 39 (H) 6 - 23 mg/dL    Creatinine 1.24 (H) 0.50 - 1.05 mg/dL    eGFR 44 (L) >60 mL/min/1.73m*2    Calcium 9.5 8.6 - 10.3 mg/dL   B-Type Natriuretic Peptide   Result Value Ref Range    BNP 96 0 - 99 pg/mL       Assessment/Plan   This patient currently has cardiac telemetry ordered; if you would like to modify or discontinue the telemetry order, click here to go to the orders activity to modify/discontinue the order.              Assessment & Plan  Acute hypoxic respiratory failure (Multi)      1. Acute hypoxic respiratory failure (Multi)      continue oxygen therapy, wean as possible, manage underlying CHF and RSV infection       2. RSV bronchiolitis      supportive care, steroids and bronchodilator. add empirical Doxycycline for bacterial infection and add Breo ellipta for wheezing.      3. Acute on chronic congestive heart failure, unspecified heart failure type  Transthoracic Echo (TTE) Complete    Transthoracic Echo (TTE) Complete    DC'd Zaroxolyn and suspended Lasix      4. Elevated troponin  Transthoracic Echo (TTE) Complete    Transthoracic Echo (TTE) Complete    partially 2/2 pulmonary conditions, no evidence of ACS      5. ALONSO (dyspnea on exertion)  Transthoracic Echo (TTE) Complete    Transthoracic Echo (TTE) Complete      6. Primary hypertension      BP stable. add norvasc 5 mg for better control      7. Obesity, Class I, BMI 30-34.9        8. Permanent atrial fibrillation (Multi)      HR 60 s/p pacemaker      9. Sick sinus syndrome (Multi)      s/p pacemaker, HR 60, no beta blocker per outpatient management               Spent 35 minutes in the follow-up management of this patient today       Richie Cassidy MD

## 2025-01-09 NOTE — PROGRESS NOTES
Care Coordinator Note:    Plan: patient in with chf, RSV/PNA. Cardio following aflutter- on coumadin, if clinical status does not improve may need cardioversion. Remains on iv lasix bid, oral doxy. PT OT red MOD patient refusing HHC and SNF, dtr agrees.  Echo to be ordered.     Status: inpatient  Payor: [facility]  Disposition: Refusing SNF and HHC. Home no needs at dc.  Barrier: cardio clearance, iv lasix, echo  ADOD: few days    Nannette Pedroza [facility]      01/09/25 1128   Discharge Planning   Expected Discharge Disposition Home   Intensity of Service   Intensity of Service 0-30 min

## 2025-01-09 NOTE — PROGRESS NOTES
Speech-Language Pathology Clinical Swallow Treatment    Patient Name: Bethany Epsinoza  MRN: [id number]  : 1945  Today's Date: 25  Start time: Start Time: 908  Stop time: Stop Time: 930  Time calculation (min) : Time Calculation (min): 22 min    ASSESSMENT  SLP TX Intervention Outcome: Making Progress Towards Goals     Treatment Tolerance: Patient tolerated treatment well  Prognosis: Fair     IMPRESSIONS: Pt exhibiting swallow function near baseline and tolerating current diet with no overt s/sx of aspiration. Pt would benefit from continued ST for continued assessment of safe diet tolerance and follow through of compensatory strategies.     PLAN  Recommended solid consistency: Soft/bite-sized (IDDSI level 6)  Recommended liquid consistency: Thin (IDDSI 0)  Recommended medication administration:  per pt preference  Safe swallow strategies:   Decrease distractions during eating/feeding,   Upright 90 degrees as possible for all oral intake,   Remain upright for 20-30 minutes after meals,   Full supervision with meals,   One to one assist with meals,   Alternate solids and liquids,   Swallow 2 times per bite/sip,   Single sips,   Small bites/sips,   Eat/feed slowly,   Effortful swallow,   Add moisture to solids     Discharge recommendation: Recommend LOW intensity ST upon DC in order to ensure safety with least restrictive diet.  Inpatient/Swing Bed or Outpatient: Inpatient  SLP TX Plan: Continue Plan of Care  SLP Plan: Skilled SLP  SLP Frequency: 2x per week  Duration: Other (Comment)  Next Treatment Priority: PO trials of upgraded textures  Discussed POC: Patient  Patient/Caregiver Agreeable: Yes      Dysphagia Goals (2025-2025):   Patient will tolerate recommended diet without observed clinical signs of aspiration   Status:Progressing   Progress: Pt tolerated soft and bite sized and thin liquids with breakfast tray with no overt s/sx of aspiration    Patient will demonstrate appropriate  strategies for swallowing safety               Status: Progressing   Progress: Pt demonstrated use of strategies with sba       SUBJECTIVE  Prior to Session Communication: Bedside nurse  RN cleared pt to participate in session  Respiratory status: Supplemental oxygen via NC  Positioning: Upright in bed  Pt was alert, pleasant, and cooperative for session.  Pt noted agreeable to trials    Pain Assessment: 0-10  0-10 (Numeric) Pain Score: 0 - No pain     Pain Location: Chest (r/t cough)  Pain Interventions: Declines    ORIENTATION: Oriented to self    OBJECTIVE  Therapeutic swallow intervention:  Therapeutic Swallow  Therapeutic Swallow Intervention : PO Trials, Caregiver Education  Liquid Diet Recommendations: Thin (IDDSI Level 0)  Swallow Comments: Pt met in room with breakfast tray present, seated uprght. Pt consumed 50-75% of meal with no overt s/sx of aspiration. Pt fed her self with slow rate and small bites independently.    Treatment/Education:  Results and recommendations were relayed to: Patient  Education provided: Yes   Learner: Patient   Barriers to learning: Communication limitations barrier   Method of teaching: Verbal   Topic: risk for aspiration and recommended safe swallow strategies   Outcome of teaching: Pt demonstrated partial understanding

## 2025-01-09 NOTE — PROGRESS NOTES
Pharmacy Consult for Warfarin (Coumadin) Management - Daily Progress Note     Labs  INR   Date Value Ref Range Status   01/09/2025 2.0 (H) 0.9 - 1.1 Final   01/08/2025 2.2 (H) 0.9 - 1.1 Final   01/07/2025 2.5 (H) 0.9 - 1.1 Final     Review  Warfarin Indication: Atrial fibrillation or flutter  Target INR: 2 - 3     Pt. INR rapidly increased w/re-initiation of home dose, will scale down and give 1.5mg today.  Orders placed per pharmacy consult. Pharmacy will continue to monitor and adjust therapy as needed.   Naman Cai, PharmD

## 2025-01-09 NOTE — PROGRESS NOTES
Occupational Therapy    OT Treatment    Patient Name: Bethany Espinoza  MRN: [id number]  Department: [address]  Room: 2026/2026-A  Today's Date: 1/9/2025  Time Calculation  Start Time: 1323  Stop Time: 1347  Time Calculation (min): 24 min        Assessment:  OT Assessment: Pt continutes to require mod/max A x2 to complete bed mobility and standing for EOB. Pt unsteady during activity demo's retro lean thorughout stand and multiple LOB.  End of Session Communication: Bedside nurse  End of Session Patient Position: Bed, 3 rail up, Alarm on     Plan:  Treatment Interventions: ADL retraining, Functional transfer training, Endurance training  OT Frequency: 3 times per week  OT Discharge Recommendations: Moderate intensity level of continued care  OT Recommended Transfer Status: Assist of 2  OT - OK to Discharge: Yes ((when medically approp.))  Treatment Interventions: ADL retraining, Functional transfer training, Endurance training    Subjective   Previous Visit Info:  OT Last Visit  OT Received On: 01/09/25  General:  General  Prior to Session Communication: Bedside nurse  Patient Position Received: Bed, 3 rail up, Alarm on  General Comment: Pt agreeable and cooperative to therapy.  Precautions:  Medical Precautions: Fall precautions, Oxygen therapy device and L/min  Precautions Comment: contact + precautions      Pain:  Pain Assessment  Pain Assessment: 0-10  0-10 (Numeric) Pain Score: 0 - No pain    Objective    Cognition:  Cognition  Orientation Level: Oriented X4       Functional Standing Tolerance:  Activity: Pt completed x2 static stands from EOB with mod A x2 tolerating for 1 minute using FWW.  Bed Mobility/Transfers: Bed Mobility 1  Bed Mobility 1: Supine to sitting, Sitting to supine  Level of Assistance 1: Maximum assistance, +2  Bed Mobility 2  Bed Mobility  2: Scooting  Level of Assistance 2: Maximum assistance, +2, Moderate verbal cues    Transfer 1  Technique 1: Sit to stand, Stand to sit  Transfer Device 1:  Walker  Transfer Level of Assistance 1: Moderate assistance, +2, Maximum verbal cues                         Therapy/Activity:      Therapeutic Activity  Therapeutic Activity Performed: Yes  Therapeutic Activity 1: Pt tolerated sitting EOB for 8 minutes with min A.      Outcome Measures:[facility] Daily Activity  Putting on and taking off regular lower body clothing: Total  Bathing (including washing, rinsing, drying): A lot  Putting on and taking off regular upper body clothing: A lot  Toileting, which includes using toilet, bedpan or urinal: Total  Taking care of personal grooming such as brushing teeth: A lot  Eating Meals: A lot  Daily Activity - Total Score: 10        Education Documentation  Body Mechanics, taught by LUIS Estrada at 1/9/2025  3:53 PM.  Learner: Patient  Readiness: Acceptance  Method: Explanation  Response: Needs Reinforcement    Education Comments  No comments found.        Goals:  Encounter Problems       Encounter Problems (Active)       ADLs       Demo. Min.A UB bathing, grooming while seated EOB.  Demo. Mod.A toileting on BSC.  (Progressing)       Start:  01/06/25    Expected End:  01/13/25               TRANSFERS       Func. trfrs. with Min.A and FWW  (Progressing)       Start:  01/06/25    Expected End:  01/13/25

## 2025-01-09 NOTE — PROGRESS NOTES
History Of Present Illness:    Bethany Espinoza is a 79 y.o. female with PMHx s/f HTN, HLD, CAD, permanent atrial fibrillation on warfarin, SSS s/p PPM, chronic diastolic heart failure, mild pHTN, MS with MR, PVD, progressive supranuclear palsy (predominantly immobile; also has issues with dysphagia), obesity, presenting with SOB, cough, congestion, and worsening weakness. In short, pt was in her typical state of health until Monday (12/30/24) when she became congested and more short of breath than usual.  Patient and family was concerned this may have been viral or sinus infection as her grandson had visited and was just getting over RSV. Patient progressively worsened and patient came to hospital.  She saw the PCP in the interim and was prescribed with antibiotic.  Subsequently patient continued to deteriorate with worsening shortness of breath and eventually came to hospital.  She is compliant with her medical therapy.  She does not recall all of her medications though.        Outside cardiology records were reviewed.  Mrs Espinoza has a history of sick sinus syndrome and underwent a dual-chamber pacemaker implantation in 2009. At some point since her pacer implantation, she developed right ventricular pacemaker lead malfunction and this required lead replacement in February 2011. Around the same time, she became symptomatic with atrial fibrillation and required sotalol. She was later switched to Dofetilide. She developed of lead infection in 2011 and a pacemaker was removed. Subsequently, in December 2015, she underwent a single-chamber pacemaker placement for suspicion of bradycardia-induced symptoms of congestive heart failure.   In October 2018, she underwent coronary angiography which did not reveal significant coronary artery disease, but she had moderate to moderately severe mitral regurgitation. She subsequently underwent a transesophageal echocardiogram by me in November 2018, and this revealed that she  only had mild mitral regurgitation (Vena contract a measured 3 mm, the regurgitant orifice area by the proximal isovelocity hemispheric surface area method was 0.17 cm2).  She had a bout of congestive heart failure exacerbation in May 2020. Apparently she was prescribed metolazone for 3 days by her primary care provider. She said that she lost 11 pounds of water weight after she did that. She was also prescribed potassium with the Lasix. Her echocardiogram in 9/21 revealed an LV ejection fraction of 55%, normal right ventricular systolic function, with a dilated right ventricle. She has severe biatrial enlargement, mild mitral, mild to moderate tricuspid regurgitation.    She is currently taking Lasix 20 mg every day, along with metolazone only as necessary on Mondays Wednesdays and Fridays, if she has a weight gain of greater than 3 pounds in a day.     Subjective Data:  Patient denies chest pain she continues to be short of breath.  No pitting edema.  Asked x-ray did show pulmonary vascular congestion.  1-6-25: Remains with dyspnea and very wheezy today.  No CP/pressure. Monitor: Afib with V pacing.  K 3.58, creatinine 0.78, INR 3.6. EF 55-60%  1/7/25: Seen with daughter at bedside. Continues to have ALONSO such as turning/repositioning in bed. On 4L NC and she did not wear oxygen prior to admission.  Atrial flutter/Vpaced on telemetry with HR 60 bpm.  1-8-25: Still with dyspnea but breathing appears to be easier today. O2 down to 2 lpm.  No CP/pressure.  Monitor: Aflutter with Vpacing. Creatinine 1.19, INR 2.2  1-9-25: No CP/pressure.  Still with some ALONSO but has been improving.  Less wheezy today, on 2L O2. Off of diuretics now, creatinine 1.24 today, BNP 96.  Remains Aflutter with Vpacing.     Overnight Events:    None     Objective Data:  Last Recorded Vitals:  Vitals:    01/08/25 2036 01/08/25 2345 01/09/25 0448 01/09/25 0700   BP:  114/69 113/70 111/70   BP Location:  Left arm Left arm Left arm   Patient  Position:  Lying Lying Lying   Pulse:  60 59 61   Resp:   25 24   Temp:  36.4 °C (97.6 °F) 36.3 °C (97.4 °F) 36.4 °C (97.5 °F)   TempSrc:  Temporal Temporal Temporal   SpO2: 94% 94% 94% 95%   Weight:   78.7 kg (173 lb 8 oz)    Height:           Last Labs:      Results from last 7 days   Lab Units 01/09/25  0345 01/08/25 0429 01/07/25  0412   SODIUM mmol/L 137 137 138   POTASSIUM mmol/L 3.3* 3.7 3.6   CHLORIDE mmol/L 92* 91* 93*   CO2 mmol/L 35* 37* 37*   BUN mg/dL 39* 30* 20   CREATININE mg/dL 1.24* 1.19* 0.90   GLUCOSE mg/dL 96 108* 104*   CALCIUM mg/dL 9.5 9.6 9.5      Results from last 7 days   Lab Units 01/08/25  0429 01/07/25  0412 01/05/25  0428   WBC AUTO x10*3/uL 6.9 6.1 6.5   HEMOGLOBIN g/dL 12.0 11.9* 10.5*   HEMATOCRIT % 40.0 40.1 34.6*   PLATELETS AUTO x10*3/uL 224 189 141*      Results from last 7 days   Lab Units 01/06/25  0519   MAGNESIUM mg/dL 2.03      TROPHS   Date/Time Value Ref Range Status   01/03/2025 07:42 PM 55 0 - 13 ng/L Final     Comment:     Previous result verified on 1/3/2025 1516 on specimen/case [id number] called with component [employer] for procedure Troponin I, High Sensitivity, Initial with value 66 ng/L.   01/03/2025 05:43 PM 57 0 - 13 ng/L Final     Comment:     Previous result verified on 1/3/2025 1516 on specimen/case [id number] called with component [employer] for procedure Troponin I, High Sensitivity, Initial with value 66 ng/L.   01/03/2025 03:23 PM 70 0 - 13 ng/L Final     Comment:     Previous result verified on 1/3/2025 1516 on specimen/case [id number] called with component [employer] for procedure Troponin I, High Sensitivity, Initial with value 66 ng/L.     BNP   Date/Time Value Ref Range Status   01/09/2025 03:45 AM 96 0 - 99 pg/mL Final   01/03/2025 02:36  0 - 99 pg/mL Final      Last I/O:  I/O last 3 completed shifts:  In: 800 (10.2 mL/kg) [P.O.:800]  Out: 575 (7.3 mL/kg) [Urine:575 (0.2 mL/kg/hr)]  Weight: 78.7 kg     Past Cardiology Tests (Last 3  Years):    Echo:  Transthoracic Echo (TTE) Complete 01/04/2025  PHYSICIAN INTERPRETATION:  Left Ventricle: The left ventricular systolic function is normal, with a visually estimated ejection fraction of 55-60%. There are no regional wall motion abnormalities. The left ventricular cavity size is upper limits of normal. There is normal septal and normal posterior left ventricular wall thickness. Left ventricular diastolic filling was indeterminate.  Left Atrium: The left atrium is moderately dilated. A bubble study using agitated saline was performed. Bubble study is negative.  Right Ventricle: The right ventricle is moderately enlarged. There is mildly reduced right ventricular systolic function. A device is visualized in the right ventricle.  Right Atrium: The right atrium is mildly dilated. There is a device visualized in the right atrium.  Aortic Valve: The aortic valve is trileaflet. There is mild aortic valve cusp calcification. The aortic valve dimensionless index is 0.56. There is no evidence of aortic valve regurgitation. The peak instantaneous gradient of the aortic valve is 8 mmHg. The mean gradient of the aortic valve is 5 mmHg.  Mitral Valve: The mitral valve is normal in structure. There is mild mitral annular calcification. There is trace mitral valve regurgitation.  Tricuspid Valve: The tricuspid valve is structurally normal. There is moderate tricuspid regurgitation. The Doppler estimated RVSP is mildly elevated right ventricular systolic pressure at 45.0 mmHg.  Pulmonic Valve: The pulmonic valve is structurally normal. There is physiologic pulmonic valve regurgitation.  Pericardium: Trivial pericardial effusion.  Aorta: The aortic root is normal. The ascending aorta was not well visualized.  Systemic Veins: The inferior vena cava appears normal in size, with IVC inspiratory collapse greater than 50%.  Ejection Fractions:  EF   Date/Time Value Ref Range Status   01/04/2025 02:34 PM 58 %           Inpatient Medications:  Scheduled medications   Medication Dose Route Frequency    amLODIPine  5 mg oral Daily    atorvastatin  10 mg oral Daily    azelastine  1 spray Each Nostril BID    donepezil  10 mg oral Nightly    doxycycline  100 mg oral q12h DAQUAN    fluticasone  2 spray Each Nostril Daily    fluticasone furoate-vilanteroL  1 puff inhalation Daily    [Held by provider] furosemide  40 mg intravenous Daily    guaiFENesin  1,200 mg oral BID    ipratropium-albuteroL  3 mL nebulization TID    nystatin  1 Application Topical BID    oxygen   inhalation Continuous - Inhalation    pantoprazole  40 mg oral Daily before breakfast    Or    pantoprazole  40 mg intravenous Daily before breakfast    polyethylene glycol  17 g oral Daily    potassium chloride CR  20 mEq oral BID     PRN medications   Medication    acetaminophen    albuterol    alteplase    benzocaine-menthol    bisacodyl    bisacodyl    heparin flush    heparin flush    ipratropium-albuteroL    melatonin    ondansetron    Or    ondansetron     Continuous Medications   Medication Dose Last Rate       Physical Exam:  Alert but tired today.    No JVD noted.  Lungs with better air movement, few wheezes, O2 at 2lpm  S1-S2 is regular I do not appreciate a murmur. V paced rhythm.  Abdomen is soft with normal bowel sounds  LE's with chronic vascular skin changes. No BLE edema  Flat affect.     Assessment/Plan   Acute decompensated heart failure-diastolic heart failure  Acute hypoxic failure  RSV infection/pneumonia  Abnormal troponin elevation  Essential hypertension  Hyperlipidemia  Permanent atrial fibrillation  Sick sinus syndrome status post PPM  Progressive supranuclear palsy     Plan  -Suspect acute decompensation in the setting of underlying viral illness.  -Abnormal troponin elevation not suggestive of ACS.  This is likely related to underlying CHF/atypical pneumonia.  Discontinue checking when downtrended.  -Obtain TTE to evaluate for any new  structural abnormalities.  -Suggest Lasix 40 mg IV twice daily.  -Suggest giving metolazone 2.5 mg daily at this time and when clinical status is stable we could then resume 3 times a week as needed as previously taking.  -Continue Coumadin for anticoagulation.  -Resume home metoprolol.  -Add low-dose losartan for better blood pressure control.  -Management of RSV and other medical condition as per primary team.  -Telemetry monitoring.  Daily weight.  Strict I's and O's.  -We will consider cardioversion for a flutter if clinical status does not improve.     Cardiology will follow please call with any questions.    Acute on chronic HFpEF - decompensation in the setting of pneumonia  Echocardiogram shows preserved EF, no RWMA, moderate TR and RVSP 45 mm hg. she continues to have symptoms of congestion will continue IV Lasix.  Renal function is stable.  Potassium is low.  Continue IV Lasix.   Will increase IV diuretic dose and monitor.   BMP/Mag in a.m.  1-6-25:  Still with dyspnea and cough.  Wheezes and crackles noted.  Would continue on IV Lasix and metolazone for now. BNP elevated.   1/7/25:  Continues to have ALONSO/cough.  Expiratory wheezing noted on exam. No crackles.  Renal function is stable.  Urine in collection bucket is melisa.  Will continue IV furosemide and metolazone for now as her renal function is stable.  Continue to monitor renal function and electrolytes while diuresing.   1-8-25:  Still with dyspnea but improving.  + cough.  No crackles noted.  Patient may be getting dry based on exam.  Will reduce Lasix dose, d/c Zaroxolyn and reevaluated tomorrow. Creatinine 1.19. BMP and BNP tomorrow.   1-9-25: Diuretics now stopped.  Was starting to look dry.  Creatinine 1.24, BNP 96. Will likely need oral diuretics restarted in near future or at d/c.      2. Elevated Troponin:  trending down.  Not suggestive of ACS.  No RWMA on echocardiogram.   1-6-25: No CP/pressure. Troponins were flat.   1/7/25: Echo with  normal LVEF.  Denies any chest pain/pressure.   1-8-25: No CP/pressure.  Medical tx.   1-9-25: Medical tx.  No CP /pressure.     3. Chronic atrial fibrillation:  Telemetry is showing ventricular paced rhythm today. Chronic OAC with coumdin. INR 2.0-3.0. EKG shows atrial flutter with ventricular pacing.   HR is controlled currently.   1-6-25: Afib with Vpacing on tele.  IMS to follow warfarin, INR 3.6 today.  1/7/25: Atrial flutter on telemetry/Vpaced with HR 60 bpm.  INR 2.5. We will consider cardioversion for a flutter if clinical status does not improve.   1-8-25: Looks like Aflutter with Vpacing today. INR 2.2.  Could consider DCC in future.   1-9-25: As she is improving, will not plan for DCC.  Rate controled and on anticoagulation.     4. RSV:  treatment per IMS.   1/7/25:  Expiratory wheezing noted on exam.  Discussed with RN and she will reach out to hospitalist for possible nebulizer/respiratory treatments.     5. Hypokalemia:   Will give additional potassium supplement today.  Recheck lab in a.m.   1-6-25: K stable at 3.8  1/7/25: Serum potassium 3.6  1-8-25: K 3.7    DISPOSITION:  Continue on current cardiac meds  Consider restarting oral diuretics on d/c  Will sign off  Patient to follow up with her usual cardiologist       Code Status:  Full Code        Ruchi Pruitt, APRN-CNP

## 2025-01-09 NOTE — PROGRESS NOTES
Physical Therapy  Physical Therapy Treatment    Patient Name: Bethany Espinoza  MRN: [id number]  Department: [address]  Room: 2026/2026-A  Today's Date: 1/9/2025  Time Calculation  Start Time: 1324  Stop Time: 1347  Time Calculation (min): 23 min    PT Plan  Treatment/Interventions: Bed mobility, Transfer training, Gait training, Neuromuscular re-education, Therapeutic exercise, Therapeutic activity  PT Plan: Ongoing PT  PT Frequency: 4 times per week  PT Discharge Recommendations: Moderate intensity level of continued care  PT Recommended Transfer Status: Assist x2  PT - OK to Discharge: Yes    General Visit Information:   PT  Visit  PT Received On: 01/09/25  Response to Previous Treatment: Patient with no complaints from previous session.  General  Reason for Referral: acute hypoxic resp. failure, RSV, weakness  General Comment: Pt agreeable to tx    Precautions:  Falls  contact+ precautions  PSP    Pain:  No pain    Cognition:  Oriented X4    Activity Tolerance:  Activity Tolerance  Endurance: Tolerates 10 - 20 min exercise with multiple rests    Treatments:  Therapeutic Exercise  8 minute sit EOB, CGA    x2 reps sit<>stand, approx 30-45 second stand each rep    Bed Mobility  Supine to sitting: Moderate assistance+2  Sitting to supine: Moderate assistance+2  Scooting: Maximum assistance+2    Transfers  Sit to stand: Moderate assistance+2  Stand to sit: Moderate assistance+2        Pt in bed pre/post tx. HOB fully elevated. Alarm on and call light in reach.        Outcome Measures:  [facility] Basic Mobility  Turning from your back to your side while in a flat bed without using bedrails: A lot  Moving from lying on your back to sitting on the side of a flat bed without using bedrails: A lot  Moving to and from bed to chair (including a wheelchair): A lot  Standing up from a chair using your arms (e.g. wheelchair or bedside chair): A lot  To walk in hospital room: A lot  Climbing 3-5 steps with railing: Total  Basic Mobility  - Total Score: 11    Education Documentation  Mobility Training, taught by Alexey Kennedy PTA at 1/9/2025  2:20 PM.  Learner: Patient  Readiness: Acceptance  Method: Explanation, Demonstration  Response: Needs Reinforcement    Education Comments  No comments found.        OP EDUCATION:       Encounter Problems       Encounter Problems (Active)       Mobility       STG - Patient will ambulate household distance using walker and no more than SB assist (Progressing)       Start:  01/06/25    Expected End:  01/20/25               PT Transfers       STG - Transfer from bed to chair/BSC using walker and no more than SB assist (Progressing)       Start:  01/06/25    Expected End:  01/20/25               Pain - Adult          Strengthening        Pt will perform 10+ reps AAROM/AROM/RROM BLE to improve functional strength needed for improved mobility.  (Progressing)       Start:  01/06/25    Expected End:  01/20/25

## 2025-01-10 ENCOUNTER — APPOINTMENT (OUTPATIENT)
Dept: CARDIOLOGY | Facility: HOSPITAL | Age: 80
End: 2025-01-10
Payer: MEDICARE

## 2025-01-10 LAB
25(OH)D3 SERPL-MCNC: 19 NG/ML (ref 30–100)
ANION GAP SERPL CALC-SCNC: 12 MMOL/L (ref 10–20)
ATRIAL RATE: 271 BPM
BASOPHILS # BLD AUTO: 0.05 X10*3/UL (ref 0–0.1)
BASOPHILS NFR BLD AUTO: 0.8 %
BUN SERPL-MCNC: 37 MG/DL (ref 6–23)
CALCIUM SERPL-MCNC: 9.6 MG/DL (ref 8.6–10.3)
CHLORIDE SERPL-SCNC: 95 MMOL/L (ref 98–107)
CO2 SERPL-SCNC: 32 MMOL/L (ref 21–32)
CREAT SERPL-MCNC: 1.09 MG/DL (ref 0.5–1.05)
EGFRCR SERPLBLD CKD-EPI 2021: 51 ML/MIN/1.73M*2
EOSINOPHIL # BLD AUTO: 0.23 X10*3/UL (ref 0–0.4)
EOSINOPHIL NFR BLD AUTO: 3.9 %
ERYTHROCYTE [DISTWIDTH] IN BLOOD BY AUTOMATED COUNT: 14.6 % (ref 11.5–14.5)
GLUCOSE SERPL-MCNC: 101 MG/DL (ref 74–99)
HCT VFR BLD AUTO: 37.6 % (ref 36–46)
HGB BLD-MCNC: 11.5 G/DL (ref 12–16)
IMM GRANULOCYTES # BLD AUTO: 0.02 X10*3/UL (ref 0–0.5)
IMM GRANULOCYTES NFR BLD AUTO: 0.3 % (ref 0–0.9)
INR PPP: 2 (ref 0.9–1.1)
LYMPHOCYTES # BLD AUTO: 0.56 X10*3/UL (ref 0.8–3)
LYMPHOCYTES NFR BLD AUTO: 9.4 %
MCH RBC QN AUTO: 26.4 PG (ref 26–34)
MCHC RBC AUTO-ENTMCNC: 30.6 G/DL (ref 32–36)
MCV RBC AUTO: 86 FL (ref 80–100)
MONOCYTES # BLD AUTO: 0.61 X10*3/UL (ref 0.05–0.8)
MONOCYTES NFR BLD AUTO: 10.3 %
NEUTROPHILS # BLD AUTO: 4.47 X10*3/UL (ref 1.6–5.5)
NEUTROPHILS NFR BLD AUTO: 75.3 %
NRBC BLD-RTO: 0 /100 WBCS (ref 0–0)
PLATELET # BLD AUTO: 243 X10*3/UL (ref 150–450)
POTASSIUM SERPL-SCNC: 3.3 MMOL/L (ref 3.5–5.3)
PROTHROMBIN TIME: 22.2 SECONDS (ref 9.8–12.8)
Q ONSET: 189 MS
QRS COUNT: 10 BEATS
QRS DURATION: 166 MS
QT INTERVAL: 520 MS
QTC CALCULATION(BAZETT): 520 MS
QTC FREDERICIA: 520 MS
R AXIS: 24 DEGREES
RBC # BLD AUTO: 4.36 X10*6/UL (ref 4–5.2)
SODIUM SERPL-SCNC: 136 MMOL/L (ref 136–145)
T AXIS: 217 DEGREES
T OFFSET: 449 MS
TSH SERPL-ACNC: 1.43 MIU/L (ref 0.44–3.98)
VENTRICULAR RATE: 60 BPM
VIT B12 SERPL-MCNC: 278 PG/ML (ref 211–911)
WBC # BLD AUTO: 5.9 X10*3/UL (ref 4.4–11.3)

## 2025-01-10 PROCEDURE — 85025 COMPLETE CBC W/AUTO DIFF WBC: CPT | Performed by: INTERNAL MEDICINE

## 2025-01-10 PROCEDURE — 2500000002 HC RX 250 W HCPCS SELF ADMINISTERED DRUGS (ALT 637 FOR MEDICARE OP, ALT 636 FOR OP/ED): Performed by: NURSE PRACTITIONER

## 2025-01-10 PROCEDURE — 2500000002 HC RX 250 W HCPCS SELF ADMINISTERED DRUGS (ALT 637 FOR MEDICARE OP, ALT 636 FOR OP/ED): Performed by: STUDENT IN AN ORGANIZED HEALTH CARE EDUCATION/TRAINING PROGRAM

## 2025-01-10 PROCEDURE — 2500000004 HC RX 250 GENERAL PHARMACY W/ HCPCS (ALT 636 FOR OP/ED): Performed by: STUDENT IN AN ORGANIZED HEALTH CARE EDUCATION/TRAINING PROGRAM

## 2025-01-10 PROCEDURE — 82607 VITAMIN B-12: CPT | Performed by: INTERNAL MEDICINE

## 2025-01-10 PROCEDURE — 2500000001 HC RX 250 WO HCPCS SELF ADMINISTERED DRUGS (ALT 637 FOR MEDICARE OP): Performed by: STUDENT IN AN ORGANIZED HEALTH CARE EDUCATION/TRAINING PROGRAM

## 2025-01-10 PROCEDURE — 2500000005 HC RX 250 GENERAL PHARMACY W/O HCPCS: Performed by: STUDENT IN AN ORGANIZED HEALTH CARE EDUCATION/TRAINING PROGRAM

## 2025-01-10 PROCEDURE — 84443 ASSAY THYROID STIM HORMONE: CPT | Performed by: INTERNAL MEDICINE

## 2025-01-10 PROCEDURE — 2500000001 HC RX 250 WO HCPCS SELF ADMINISTERED DRUGS (ALT 637 FOR MEDICARE OP): Performed by: INTERNAL MEDICINE

## 2025-01-10 PROCEDURE — 93010 ELECTROCARDIOGRAM REPORT: CPT | Performed by: STUDENT IN AN ORGANIZED HEALTH CARE EDUCATION/TRAINING PROGRAM

## 2025-01-10 PROCEDURE — 36415 COLL VENOUS BLD VENIPUNCTURE: CPT | Performed by: INTERNAL MEDICINE

## 2025-01-10 PROCEDURE — 2500000001 HC RX 250 WO HCPCS SELF ADMINISTERED DRUGS (ALT 637 FOR MEDICARE OP)

## 2025-01-10 PROCEDURE — 82306 VITAMIN D 25 HYDROXY: CPT | Performed by: INTERNAL MEDICINE

## 2025-01-10 PROCEDURE — 80048 BASIC METABOLIC PNL TOTAL CA: CPT | Performed by: INTERNAL MEDICINE

## 2025-01-10 PROCEDURE — 2060000001 HC INTERMEDIATE ICU ROOM DAILY

## 2025-01-10 PROCEDURE — 85610 PROTHROMBIN TIME: CPT | Performed by: STUDENT IN AN ORGANIZED HEALTH CARE EDUCATION/TRAINING PROGRAM

## 2025-01-10 PROCEDURE — 2500000004 HC RX 250 GENERAL PHARMACY W/ HCPCS (ALT 636 FOR OP/ED): Performed by: INTERNAL MEDICINE

## 2025-01-10 PROCEDURE — 99233 SBSQ HOSP IP/OBS HIGH 50: CPT | Performed by: INTERNAL MEDICINE

## 2025-01-10 PROCEDURE — 94640 AIRWAY INHALATION TREATMENT: CPT

## 2025-01-10 PROCEDURE — 36415 COLL VENOUS BLD VENIPUNCTURE: CPT | Performed by: STUDENT IN AN ORGANIZED HEALTH CARE EDUCATION/TRAINING PROGRAM

## 2025-01-10 PROCEDURE — 93005 ELECTROCARDIOGRAM TRACING: CPT

## 2025-01-10 PROCEDURE — 2500000002 HC RX 250 W HCPCS SELF ADMINISTERED DRUGS (ALT 637 FOR MEDICARE OP, ALT 636 FOR OP/ED): Performed by: INTERNAL MEDICINE

## 2025-01-10 RX ORDER — WARFARIN 2 MG/1
2 TABLET ORAL ONCE
Status: COMPLETED | OUTPATIENT
Start: 2025-01-10 | End: 2025-01-10

## 2025-01-10 RX ORDER — POTASSIUM CHLORIDE 20 MEQ/1
40 TABLET, EXTENDED RELEASE ORAL 2 TIMES DAILY
Status: ACTIVE | OUTPATIENT
Start: 2025-01-10 | End: 2025-01-11

## 2025-01-10 RX ORDER — BENZONATATE 100 MG/1
100 CAPSULE ORAL 3 TIMES DAILY PRN
Status: DISCONTINUED | OUTPATIENT
Start: 2025-01-10 | End: 2025-01-11 | Stop reason: HOSPADM

## 2025-01-10 RX ORDER — POTASSIUM CHLORIDE 14.9 MG/ML
20 INJECTION INTRAVENOUS
Status: DISPENSED | OUTPATIENT
Start: 2025-01-10 | End: 2025-01-11

## 2025-01-10 RX ADMIN — ATORVASTATIN CALCIUM 10 MG: 10 TABLET, FILM COATED ORAL at 08:30

## 2025-01-10 RX ADMIN — POTASSIUM CHLORIDE 20 MEQ: 14.9 INJECTION, SOLUTION INTRAVENOUS at 23:55

## 2025-01-10 RX ADMIN — DONEPEZIL HYDROCHLORIDE 10 MG: 5 TABLET ORAL at 21:42

## 2025-01-10 RX ADMIN — AZELASTINE HYDROCHLORIDE 1 SPRAY: 137 SPRAY, METERED NASAL at 08:31

## 2025-01-10 RX ADMIN — NYSTATIN 1 APPLICATION: 100000 POWDER TOPICAL at 08:31

## 2025-01-10 RX ADMIN — FLUTICASONE FUROATE AND VILANTEROL TRIFENATATE 1 PUFF: 200; 25 POWDER RESPIRATORY (INHALATION) at 14:25

## 2025-01-10 RX ADMIN — FLUTICASONE PROPIONATE 2 SPRAY: 50 SPRAY, METERED NASAL at 08:31

## 2025-01-10 RX ADMIN — WARFARIN SODIUM 2 MG: 2 TABLET ORAL at 18:04

## 2025-01-10 RX ADMIN — Medication 2 L/MIN: at 22:55

## 2025-01-10 RX ADMIN — IPRATROPIUM BROMIDE AND ALBUTEROL SULFATE 3 ML: 2.5; .5 SOLUTION RESPIRATORY (INHALATION) at 04:10

## 2025-01-10 RX ADMIN — IPRATROPIUM BROMIDE AND ALBUTEROL SULFATE 3 ML: 2.5; .5 SOLUTION RESPIRATORY (INHALATION) at 15:59

## 2025-01-10 RX ADMIN — POTASSIUM CHLORIDE 40 MEQ: 1500 TABLET, EXTENDED RELEASE ORAL at 14:21

## 2025-01-10 RX ADMIN — NYSTATIN 1 APPLICATION: 100000 POWDER TOPICAL at 21:00

## 2025-01-10 RX ADMIN — POTASSIUM CHLORIDE 20 MEQ: 1500 TABLET, EXTENDED RELEASE ORAL at 08:31

## 2025-01-10 RX ADMIN — Medication 1 L/MIN: at 08:43

## 2025-01-10 RX ADMIN — ONDANSETRON 4 MG: 2 INJECTION INTRAMUSCULAR; INTRAVENOUS at 05:51

## 2025-01-10 RX ADMIN — Medication 2 L/MIN: at 03:21

## 2025-01-10 RX ADMIN — PANTOPRAZOLE SODIUM 40 MG: 40 INJECTION, POWDER, FOR SOLUTION INTRAVENOUS at 05:53

## 2025-01-10 RX ADMIN — AMLODIPINE BESYLATE 5 MG: 5 TABLET ORAL at 08:30

## 2025-01-10 RX ADMIN — GUAIFENESIN 1200 MG: 600 TABLET ORAL at 08:30

## 2025-01-10 RX ADMIN — BISACODYL 10 MG: 5 TABLET, COATED ORAL at 12:12

## 2025-01-10 RX ADMIN — Medication 2 L/MIN: at 15:59

## 2025-01-10 RX ADMIN — IPRATROPIUM BROMIDE AND ALBUTEROL SULFATE 3 ML: 2.5; .5 SOLUTION RESPIRATORY (INHALATION) at 08:40

## 2025-01-10 RX ADMIN — AZELASTINE HYDROCHLORIDE 1 SPRAY: 137 SPRAY, METERED NASAL at 21:43

## 2025-01-10 RX ADMIN — IPRATROPIUM BROMIDE AND ALBUTEROL SULFATE 3 ML: 2.5; .5 SOLUTION RESPIRATORY (INHALATION) at 21:02

## 2025-01-10 RX ADMIN — Medication 2 L/MIN: at 21:02

## 2025-01-10 RX ADMIN — DOXYCYCLINE 100 MG: 100 CAPSULE ORAL at 08:30

## 2025-01-10 ASSESSMENT — COGNITIVE AND FUNCTIONAL STATUS - GENERAL
DAILY ACTIVITIY SCORE: 12
EATING MEALS: A LOT
TURNING FROM BACK TO SIDE WHILE IN FLAT BAD: A LOT
DRESSING REGULAR UPPER BODY CLOTHING: A LOT
DAILY ACTIVITIY SCORE: 12
STANDING UP FROM CHAIR USING ARMS: TOTAL
STANDING UP FROM CHAIR USING ARMS: A LOT
PERSONAL GROOMING: A LOT
MOVING TO AND FROM BED TO CHAIR: A LOT
MOBILITY SCORE: 10
DRESSING REGULAR LOWER BODY CLOTHING: A LOT
PERSONAL GROOMING: A LOT
MOVING FROM LYING ON BACK TO SITTING ON SIDE OF FLAT BED WITH BEDRAILS: A LITTLE
EATING MEALS: A LOT
TOILETING: A LOT
CLIMB 3 TO 5 STEPS WITH RAILING: TOTAL
DRESSING REGULAR UPPER BODY CLOTHING: A LOT
MOBILITY SCORE: 11
DRESSING REGULAR LOWER BODY CLOTHING: A LOT
WALKING IN HOSPITAL ROOM: TOTAL
WALKING IN HOSPITAL ROOM: TOTAL
CLIMB 3 TO 5 STEPS WITH RAILING: TOTAL
HELP NEEDED FOR BATHING: A LOT
MOVING TO AND FROM BED TO CHAIR: A LOT
HELP NEEDED FOR BATHING: A LOT
TOILETING: A LOT
MOVING FROM LYING ON BACK TO SITTING ON SIDE OF FLAT BED WITH BEDRAILS: A LITTLE
TURNING FROM BACK TO SIDE WHILE IN FLAT BAD: A LOT

## 2025-01-10 ASSESSMENT — PAIN SCALES - GENERAL
PAINLEVEL_OUTOF10: 1
PAINLEVEL_OUTOF10: 0 - NO PAIN

## 2025-01-10 ASSESSMENT — PAIN - FUNCTIONAL ASSESSMENT
PAIN_FUNCTIONAL_ASSESSMENT: 0-10

## 2025-01-10 NOTE — CARE PLAN
The patient's goals for the shift include Pt. will have a safe, restful and uneventful evening    The clinical goals for the shift include Patient will be free from falls this shift.      Problem: Skin  Goal: Decreased wound size/increased tissue granulation at next dressing change  Outcome: Progressing  Goal: Participates in plan/prevention/treatment measures  Outcome: Progressing  Goal: Prevent/manage excess moisture  Outcome: Progressing  Goal: Prevent/minimize sheer/friction injuries  Outcome: Progressing  Goal: Promote/optimize nutrition  Outcome: Progressing  Goal: Promote skin healing  Outcome: Progressing     Problem: Pain - Adult  Goal: Verbalizes/displays adequate comfort level or baseline comfort level  Outcome: Progressing     Problem: Safety - Adult  Goal: Free from fall injury  Outcome: Progressing     Problem: Discharge Planning  Goal: Discharge to home or other facility with appropriate resources  Outcome: Progressing     Problem: Chronic Conditions and Co-morbidities  Goal: Patient's chronic conditions and co-morbidity symptoms are monitored and maintained or improved  Outcome: Progressing     Problem: Respiratory  Goal: Clear secretions with interventions this shift  Outcome: Progressing  Goal: Minimize anxiety/maximize coping throughout shift  Outcome: Progressing  Goal: Minimal/no exertional discomfort or dyspnea this shift  Outcome: Progressing  Goal: No signs of respiratory distress (eg. Use of accessory muscles. Peds grunting)  Outcome: Progressing  Goal: Patent airway maintained this shift  Outcome: Progressing  Goal: Tolerate mechanical ventilation evidenced by VS/agitation level this shift  Outcome: Progressing  Goal: Tolerate pulmonary toileting this shift  Outcome: Progressing  Goal: Verbalize decreased shortness of breath this shift  Outcome: Progressing  Goal: Wean oxygen to maintain O2 saturation per order/standard this shift  Outcome: Progressing  Goal: Increase self care and/or  family involvement in next 24 hours  Outcome: Progressing     Problem: Fall/Injury  Goal: Not fall by end of shift  Outcome: Progressing  Goal: Be free from injury by end of the shift  Outcome: Progressing  Goal: Verbalize understanding of personal risk factors for fall in the hospital  Outcome: Progressing  Goal: Verbalize understanding of risk factor reduction measures to prevent injury from fall in the home  Outcome: Progressing  Goal: Use assistive devices by end of the shift  Outcome: Progressing  Goal: Pace activities to prevent fatigue by end of the shift  Outcome: Progressing     Problem: Nutrition  Goal: Oral intake greater 75%  Outcome: Progressing  Goal: Consume prescribed supplement  Outcome: Progressing  Goal: Lab values WNL  Outcome: Progressing  Goal: Maintain stable weight  Outcome: Progressing

## 2025-01-10 NOTE — PROGRESS NOTES
"Bethany Espinoza is a 79 y.o. female on day 6 of admission presenting with Acute hypoxic respiratory failure (Multi).      Subjective   Bethany Espinoza is a 79 y.o. female with PMHx s/f HTN, HLD, CAD, Afib/flutter on warfarin, SSS s/p PPM, chronic diastolic heart failure, pHTN, valvular heart disease, PVD, progressive supranuclear palsy (predominantly immobile; also has issues with dysphagia), obesity, presenting with SOB, cough, congestion, and worsening weakness. Hx mostly obtained from her daughter at the bedside. In short, pt was in her typical state of health until Monday (12/30/24) when she became congested. Was concerned this may have been RSV or a sinus infection as her grandson had visited and was just getting over RSV. She went to see her PCP and was prescribed decongestants and Augmentin 12/31/24 to help with symptoms; tested negative for Flu, COVID, and ?RSV at that time. Over the next few days, daughter called in to check on her; until day of admission (01/03/2025), patient had reported that she had been doing okay, but today she informed her daughter that she was feeling unwell and needed assistance. When her daughter arrived, she found the patient to sound like she was \"drowning\" in her fluid around her lungs which has happened with prior issues with her heart failure, additionally she had been so weak that her patient's  was unable to help get her up out of bed to go to the bathroom and she did end up soiling herself. Patient reports that she has felt nauseous and has had diarrhea x 1 day as well, does not appear to have missed any medications in terms of heart failure; however, has just been generally unwell. Denies cp/pressure, palpitations, diaphoresis, dizziness / lightheadedness, syncope or near syncope, HA, vision changes, f/c, abd pain. Has chronic baseline lower extremity edema which is unchanged times many years per daughter at the bedside.     ED Course (Summary - please note all " labs, imaging studies, and interventions noted below have been personally reviewed and/or interpreted on day of admission):   Vitals on presentation: T98.2, /83, HR 60, RR 30 (most recently 24), SpO2 96% 2 L nasal cannula  Labs: CBC with WBC 5.2, Hgb 10.8, platelets 149.  CMP with glucose 121, sodium 138, potassium 4.3, BUN 17, serum creatinine 0.94.  Lactate 1.6.  .  High-sensitivity troponin [id number] on admission 57 and 55.  PT/INR: 22.4/2.8.  RSV positive; flu A, B, COVID PCR all negative.  VBG relatively unrevealing.  UA without evidence of infection.  EKG: Sinus with short LA; right bundle branch block which has been previously seen on prior EKGs as well, no overt acute ischemic changes compared to prior  Imaging: CXR with moderate cardiomegaly, vascular congestion  Interventions: No medications given in the ED; cardiology was consulted for elevated troponin and they felt given the EKG stability compared to prior there was no need for heparinization unless the patient was not therapeutic with her warfarin.  Admitted to medicine for further management     12-point ROS reviewed and found to be negative aside from aforementioned positives in HPI and/or noted in dedicated ROS section below.      1/4/2025: Patient c/o shortness of breath. She remained on NC oxygen 4 L. Cardiology note appreciated. Add Norvasc and Zaroxolyn. Due to HR 60, no home meds of metoprolol listed.     1/5/2025: Patient remained shortness of breath and wheezing. She is on NC oxygen 3.5 L. Breo ellipta ordered for wheezing. Add empirical Doxycyclione for possible bacterial infection  1/6/2025: Patient was seen and examined.  Continues to complain of dry cough and some stuffy nose.  I we will add a humidifier to patient's oxygen.  Saturating 97% on 4 L will have weaned down to 3 and will continue weaning as tolerated.  Continue breathing treatment steroids and antibiotics.  Echocardiogram shows EF of 55 to 60% with dilated left  atrium and mildly elevated right ventricular systolic pressure.  Blood cultures are negative x 2 days.  INR still supratherapeutic at 3.6.  Repeat CBC and BMP in AM.  1/7/2025: Patient was seen and examined.  Now saturating well on 3 L nasal cannula oxygen.  Still complaining of cough and is severely deconditioned.  Bicarb level is at 37 today likely due to contraction alkalosis.  Will order 1 dose of acetazolamide IV.  PT recommended moderate intensity was patient is refusing skilled nursing facility.  INR is now therapeutic at 2.5 today.  Will resume warfarin today.  Pharmacy to dose.  Repeat CBC and BMP in AM.  1/8/2025: Patient was seen and examined.  Oxygen has been weaned down to 2 L nasal cannula oxygen today.  Patient showed bump in creatinine to 1.19 today.  Zaroxolyn has been discontinued.  She got 1 dose of Lasix this morning which I have suspended.  We will add chest physical therapy and scheduled DuoNebs today.  Respiratory therapist to add chest physical therapy.  INR still therapeutic at 2.2.  Continue warfarin at current dose.  Continue to trend CBC and BMP daily.  1/9/2025: Patient was seen and examined.  Oxygen has been weaned down to 1 L nasal cannula oxygen today.  Creatinine trended up slightly to 1.24 today.  INR was still therapeutic at 2.0.  Patient complains still of generalized weakness however refusing skilled nursing facility continue chest physical therapy and will continue to wean oxygen as tolerated.  Maintain warfarin dose.   9/20/2025: Patient was seen and examined.  Saturating well on 1 L nasal cannula oxygen.  Creatinine has trended down to 1.09 today.  INR still therapeutic at 2.0.  Continue p.o. warfarin.  Continue to wean oxygen as tolerated.  Discussed extensively with patient's daughter Kathya at bedside today. CT chest shows no ILD.  Patient will need as needed albuterol inhaler on discharge today.  Potential discharge within the next 24 to 48 hours.      Objective     Last  Recorded Vitals  /74 (BP Location: Left arm)   Pulse 60   Temp 36.9 °C (98.5 °F) (Temporal)   Resp 16   Wt 79.4 kg (175 lb)   SpO2 94%   Intake/Output last 3 Shifts:    Intake/Output Summary (Last 24 hours) at 1/10/2025 1307  Last data filed at 1/10/2025 0400  Gross per 24 hour   Intake --   Output 500 ml   Net -500 ml       Admission Weight  Weight: 81.6 kg (180 lb) (01/03/25 1403)    Daily Weight  01/10/25 : 79.4 kg (175 lb)    Image Results  Electrocardiogram, 12-lead PRN ACS symptoms  Ventricular-paced rhythm  Abnormal ECG  When compared with ECG of 05-JAN-2025 12:15,  No significant change was found      Physical Exam  Constitutional:       General: She is in acute distress.      Appearance: She is ill-appearing.   HENT:      Head: Normocephalic.      Mouth/Throat:      Mouth: Mucous membranes are moist.      Pharynx: Oropharynx is clear.   Eyes:      Pupils: Pupils are equal, round, and reactive to light.   Cardiovascular:      Rate and Rhythm: Normal rate and regular rhythm.      Heart sounds: Normal heart sounds. No murmur heard.     No gallop.   Pulmonary:      Effort: Respiratory distress present.      Breath sounds: Wheezing and rales present.   Abdominal:      General: Bowel sounds are normal. There is no distension.      Palpations: Abdomen is soft.      Tenderness: There is no abdominal tenderness.   Musculoskeletal:         General: No swelling. Normal range of motion.      Cervical back: Neck supple. No rigidity.   Skin:     General: Skin is warm and dry.   Neurological:      General: No focal deficit present.      Mental Status: She is alert.      Cranial Nerves: No cranial nerve deficit.      Sensory: No sensory deficit.   Psychiatric:         Mood and Affect: Mood normal.         Behavior: Behavior normal.         Relevant Results             Scheduled medications  amLODIPine, 5 mg, oral, Daily  atorvastatin, 10 mg, oral, Daily  azelastine, 1 spray, Each Nostril, BID  donepezil, 10 mg,  oral, Nightly  doxycycline, 100 mg, oral, q12h DAQUAN  fluticasone, 2 spray, Each Nostril, Daily  fluticasone furoate-vilanteroL, 1 puff, inhalation, Daily  [Held by provider] furosemide, 40 mg, intravenous, Daily  guaiFENesin, 1,200 mg, oral, BID  ipratropium-albuteroL, 3 mL, nebulization, TID  nystatin, 1 Application, Topical, BID  oxygen, , inhalation, Continuous - Inhalation  pantoprazole, 40 mg, oral, Daily before breakfast   Or  pantoprazole, 40 mg, intravenous, Daily before breakfast  polyethylene glycol, 17 g, oral, Daily  potassium chloride CR, 20 mEq, oral, BID  potassium chloride CR, 40 mEq, oral, BID  warfarin, 2 mg, oral, Once      Continuous medications     PRN medications  PRN medications: acetaminophen, albuterol, alteplase, benzocaine-menthol, benzonatate, bisacodyl, bisacodyl, heparin flush, heparin flush, ipratropium-albuteroL, melatonin, ondansetron **OR** ondansetron  Results for orders placed or performed during the hospital encounter of 01/03/25 (from the past 96 hours)   Protime-INR   Result Value Ref Range    Protime 28.7 (H) 9.8 - 12.8 seconds    INR 2.5 (H) 0.9 - 1.1   CBC and Auto Differential   Result Value Ref Range    WBC 6.1 4.4 - 11.3 x10*3/uL    nRBC 0.0 0.0 - 0.0 /100 WBCs    RBC 4.68 4.00 - 5.20 x10*6/uL    Hemoglobin 11.9 (L) 12.0 - 16.0 g/dL    Hematocrit 40.1 36.0 - 46.0 %    MCV 86 80 - 100 fL    MCH 25.4 (L) 26.0 - 34.0 pg    MCHC 29.7 (L) 32.0 - 36.0 g/dL    RDW 14.5 11.5 - 14.5 %    Platelets 189 150 - 450 x10*3/uL    Neutrophils % 72.2 40.0 - 80.0 %    Immature Granulocytes %, Automated 0.2 0.0 - 0.9 %    Lymphocytes % 12.4 13.0 - 44.0 %    Monocytes % 10.3 2.0 - 10.0 %    Eosinophils % 4.4 0.0 - 6.0 %    Basophils % 0.5 0.0 - 2.0 %    Neutrophils Absolute 4.42 1.60 - 5.50 x10*3/uL    Immature Granulocytes Absolute, Automated 0.01 0.00 - 0.50 x10*3/uL    Lymphocytes Absolute 0.76 (L) 0.80 - 3.00 x10*3/uL    Monocytes Absolute 0.63 0.05 - 0.80 x10*3/uL    Eosinophils Absolute  0.27 0.00 - 0.40 x10*3/uL    Basophils Absolute 0.03 0.00 - 0.10 x10*3/uL   Basic Metabolic Panel   Result Value Ref Range    Glucose 104 (H) 74 - 99 mg/dL    Sodium 138 136 - 145 mmol/L    Potassium 3.6 3.5 - 5.3 mmol/L    Chloride 93 (L) 98 - 107 mmol/L    Bicarbonate 37 (H) 21 - 32 mmol/L    Anion Gap 12 10 - 20 mmol/L    Urea Nitrogen 20 6 - 23 mg/dL    Creatinine 0.90 0.50 - 1.05 mg/dL    eGFR 65 >60 mL/min/1.73m*2    Calcium 9.5 8.6 - 10.3 mg/dL   Protime-INR   Result Value Ref Range    Protime 24.9 (H) 9.8 - 12.8 seconds    INR 2.2 (H) 0.9 - 1.1   CBC and Auto Differential   Result Value Ref Range    WBC 6.9 4.4 - 11.3 x10*3/uL    nRBC 0.0 0.0 - 0.0 /100 WBCs    RBC 4.59 4.00 - 5.20 x10*6/uL    Hemoglobin 12.0 12.0 - 16.0 g/dL    Hematocrit 40.0 36.0 - 46.0 %    MCV 87 80 - 100 fL    MCH 26.1 26.0 - 34.0 pg    MCHC 30.0 (L) 32.0 - 36.0 g/dL    RDW 14.6 (H) 11.5 - 14.5 %    Platelets 224 150 - 450 x10*3/uL    Neutrophils % 74.5 40.0 - 80.0 %    Immature Granulocytes %, Automated 0.4 0.0 - 0.9 %    Lymphocytes % 11.2 13.0 - 44.0 %    Monocytes % 9.3 2.0 - 10.0 %    Eosinophils % 4.3 0.0 - 6.0 %    Basophils % 0.3 0.0 - 2.0 %    Neutrophils Absolute 5.14 1.60 - 5.50 x10*3/uL    Immature Granulocytes Absolute, Automated 0.03 0.00 - 0.50 x10*3/uL    Lymphocytes Absolute 0.77 (L) 0.80 - 3.00 x10*3/uL    Monocytes Absolute 0.64 0.05 - 0.80 x10*3/uL    Eosinophils Absolute 0.30 0.00 - 0.40 x10*3/uL    Basophils Absolute 0.02 0.00 - 0.10 x10*3/uL   Basic Metabolic Panel   Result Value Ref Range    Glucose 108 (H) 74 - 99 mg/dL    Sodium 137 136 - 145 mmol/L    Potassium 3.7 3.5 - 5.3 mmol/L    Chloride 91 (L) 98 - 107 mmol/L    Bicarbonate 37 (H) 21 - 32 mmol/L    Anion Gap 13 10 - 20 mmol/L    Urea Nitrogen 30 (H) 6 - 23 mg/dL    Creatinine 1.19 (H) 0.50 - 1.05 mg/dL    eGFR 46 (L) >60 mL/min/1.73m*2    Calcium 9.6 8.6 - 10.3 mg/dL   Protime-INR   Result Value Ref Range    Protime 22.5 (H) 9.8 - 12.8 seconds    INR  2.0 (H) 0.9 - 1.1   Basic Metabolic Panel   Result Value Ref Range    Glucose 96 74 - 99 mg/dL    Sodium 137 136 - 145 mmol/L    Potassium 3.3 (L) 3.5 - 5.3 mmol/L    Chloride 92 (L) 98 - 107 mmol/L    Bicarbonate 35 (H) 21 - 32 mmol/L    Anion Gap 13 10 - 20 mmol/L    Urea Nitrogen 39 (H) 6 - 23 mg/dL    Creatinine 1.24 (H) 0.50 - 1.05 mg/dL    eGFR 44 (L) >60 mL/min/1.73m*2    Calcium 9.5 8.6 - 10.3 mg/dL   B-Type Natriuretic Peptide   Result Value Ref Range    BNP 96 0 - 99 pg/mL   Electrocardiogram, 12-lead PRN ACS symptoms   Result Value Ref Range    Ventricular Rate 60 BPM    Atrial Rate 271 BPM    QRS Duration 166 ms    QT Interval 520 ms    QTC Calculation(Bazett) 520 ms    R Axis 24 degrees    T Axis 217 degrees    QRS Count 10 beats    Q Onset 189 ms    T Offset 449 ms    QTC Fredericia 520 ms   Protime-INR   Result Value Ref Range    Protime 22.2 (H) 9.8 - 12.8 seconds    INR 2.0 (H) 0.9 - 1.1   Basic Metabolic Panel   Result Value Ref Range    Glucose 101 (H) 74 - 99 mg/dL    Sodium 136 136 - 145 mmol/L    Potassium 3.3 (L) 3.5 - 5.3 mmol/L    Chloride 95 (L) 98 - 107 mmol/L    Bicarbonate 32 21 - 32 mmol/L    Anion Gap 12 10 - 20 mmol/L    Urea Nitrogen 37 (H) 6 - 23 mg/dL    Creatinine 1.09 (H) 0.50 - 1.05 mg/dL    eGFR 51 (L) >60 mL/min/1.73m*2    Calcium 9.6 8.6 - 10.3 mg/dL   CBC and Auto Differential   Result Value Ref Range    WBC 5.9 4.4 - 11.3 x10*3/uL    nRBC 0.0 0.0 - 0.0 /100 WBCs    RBC 4.36 4.00 - 5.20 x10*6/uL    Hemoglobin 11.5 (L) 12.0 - 16.0 g/dL    Hematocrit 37.6 36.0 - 46.0 %    MCV 86 80 - 100 fL    MCH 26.4 26.0 - 34.0 pg    MCHC 30.6 (L) 32.0 - 36.0 g/dL    RDW 14.6 (H) 11.5 - 14.5 %    Platelets 243 150 - 450 x10*3/uL    Neutrophils % 75.3 40.0 - 80.0 %    Immature Granulocytes %, Automated 0.3 0.0 - 0.9 %    Lymphocytes % 9.4 13.0 - 44.0 %    Monocytes % 10.3 2.0 - 10.0 %    Eosinophils % 3.9 0.0 - 6.0 %    Basophils % 0.8 0.0 - 2.0 %    Neutrophils Absolute 4.47 1.60 - 5.50  x10*3/uL    Immature Granulocytes Absolute, Automated 0.02 0.00 - 0.50 x10*3/uL    Lymphocytes Absolute 0.56 (L) 0.80 - 3.00 x10*3/uL    Monocytes Absolute 0.61 0.05 - 0.80 x10*3/uL    Eosinophils Absolute 0.23 0.00 - 0.40 x10*3/uL    Basophils Absolute 0.05 0.00 - 0.10 x10*3/uL       Assessment/Plan   This patient currently has cardiac telemetry ordered; if you would like to modify or discontinue the telemetry order, click here to go to the orders activity to modify/discontinue the order.              Assessment & Plan  Acute hypoxic respiratory failure (Multi)      1. Acute hypoxic respiratory failure (Multi)      continue oxygen therapy, wean as possible, manage underlying CHF and RSV infection      2. RSV bronchiolitis      supportive care, steroids and bronchodilator. add empirical Doxycycline for bacterial infection and add Breo ellipta for wheezing.      3. Acute on chronic congestive heart failure, unspecified heart failure type  Transthoracic Echo (TTE) Complete    Transthoracic Echo (TTE) Complete    DC'd Zaroxolyn and suspended Lasix      4. Elevated troponin  Transthoracic Echo (TTE) Complete    Transthoracic Echo (TTE) Complete    partially 2/2 pulmonary conditions, no evidence of ACS      5. ALONSO (dyspnea on exertion)  Transthoracic Echo (TTE) Complete    Transthoracic Echo (TTE) Complete      6. Primary hypertension      BP stable. add norvasc 5 mg for better control      7. Obesity, Class I, BMI 30-34.9        8. Permanent atrial fibrillation (Multi)      HR 60 s/p pacemaker      9. Sick sinus syndrome (Multi)      s/p pacemaker, HR 60, no beta blocker per outpatient management               Spent 35 minutes in the follow-up management of this patient today       Richie Cassidy MD

## 2025-01-10 NOTE — CARE PLAN
The patient's goals for the shift include Pt. will have a safe, restful and uneventful evening    The clinical goals for the shift include Patient will be free from falls this shift.      Problem: Skin  Goal: Decreased wound size/increased tissue granulation at next dressing change  1/10/2025 0937 by Whitney Garcia RN  Outcome: Progressing  1/10/2025 0937 by Whitney Garcia RN  Outcome: Progressing  Goal: Participates in plan/prevention/treatment measures  1/10/2025 0937 by Whitney Garcia RN  Outcome: Progressing  1/10/2025 0937 by Whitney Garcia RN  Outcome: Progressing  Goal: Prevent/manage excess moisture  1/10/2025 0937 by Whitney Garcia RN  Outcome: Progressing  1/10/2025 0937 by Whitney Garcia RN  Outcome: Progressing  Goal: Prevent/minimize sheer/friction injuries  1/10/2025 0937 by Whitney Garcia RN  Outcome: Progressing  1/10/2025 0937 by Whitney Garcia RN  Outcome: Progressing  Goal: Promote/optimize nutrition  Outcome: Progressing  Goal: Promote skin healing  Outcome: Progressing     Problem: Pain - Adult  Goal: Verbalizes/displays adequate comfort level or baseline comfort level  Outcome: Progressing     Problem: Safety - Adult  Goal: Free from fall injury  Outcome: Progressing     Problem: Discharge Planning  Goal: Discharge to home or other facility with appropriate resources  Outcome: Progressing     Problem: Chronic Conditions and Co-morbidities  Goal: Patient's chronic conditions and co-morbidity symptoms are monitored and maintained or improved  Outcome: Progressing     Problem: Respiratory  Goal: Clear secretions with interventions this shift  Outcome: Progressing  Goal: Minimize anxiety/maximize coping throughout shift  Outcome: Progressing  Goal: Minimal/no exertional discomfort or dyspnea this shift  Outcome: Progressing  Goal: No signs of respiratory distress (eg. Use of accessory muscles. Peds grunting)  Outcome: Progressing  Goal: Patent airway maintained this shift  Outcome: Progressing  Goal: Tolerate mechanical  ventilation evidenced by VS/agitation level this shift  Outcome: Progressing  Goal: Tolerate pulmonary toileting this shift  Outcome: Progressing  Goal: Verbalize decreased shortness of breath this shift  Outcome: Progressing  Goal: Wean oxygen to maintain O2 saturation per order/standard this shift  Outcome: Progressing  Goal: Increase self care and/or family involvement in next 24 hours  Outcome: Progressing     Problem: Fall/Injury  Goal: Not fall by end of shift  Outcome: Progressing  Goal: Be free from injury by end of the shift  Outcome: Progressing  Goal: Verbalize understanding of personal risk factors for fall in the hospital  Outcome: Progressing  Goal: Verbalize understanding of risk factor reduction measures to prevent injury from fall in the home  Outcome: Progressing  Goal: Use assistive devices by end of the shift  Outcome: Progressing  Goal: Pace activities to prevent fatigue by end of the shift  Outcome: Progressing

## 2025-01-10 NOTE — PROGRESS NOTES
Encounter addended by: Alpa Kingston PT on: 9/30/2019 12:15 PM   Actions taken: Sign clinical note Nutrition Initial Assessment:   Nutrition Assessment    Reason for Assessment: Length of stay    Medical history per chart:   79 y.o. female with PMHx s/f HTN, HLD, CAD, Afib/flutter on warfarin, SSS s/p PPM, chronic diastolic heart failure, pHTN, valvular heart disease, PVD, progressive supranuclear palsy (predominantly immobile; also has issues with dysphagia), obesity, presenting with SOB, cough, congestion, and worsening weakness. Hx mostly obtained from her daughter at the bedside. In short, pt was in her typical state of health until Monday (12/30/24) when she became congested.     1/10:  Pt difficult to understand, reports tries to eat, but agreeable to supplements.  Noted % of meals consumed.    Nutrition History:     Food and Nutrient History: Patient difficult to understand and poor historian          Current Diet: Adult diet Cardiac; 70 gm fat; 2 - 3 grams Sodium; Soft and bite sized 6; 2000 mL fluid  Supplement(s): No  Average meal Intake during admission: <50%       Nutrition Related Findings:   BM: Last BM Date: 01/07/25  Food allergies: NKFA. is allergic to ace inhibitors, diltiazem, meperidine, solifenacin, sotalol, and tolterodine.  Meds/Labs reviewed.  amLODIPine, 5 mg, oral, Daily  atorvastatin, 10 mg, oral, Daily  azelastine, 1 spray, Each Nostril, BID  donepezil, 10 mg, oral, Nightly  doxycycline, 100 mg, oral, q12h DAQUAN  fluticasone, 2 spray, Each Nostril, Daily  fluticasone furoate-vilanteroL, 1 puff, inhalation, Daily  [Held by provider] furosemide, 40 mg, intravenous, Daily  guaiFENesin, 1,200 mg, oral, BID  ipratropium-albuteroL, 3 mL, nebulization, TID  nystatin, 1 Application, Topical, BID  oxygen, , inhalation, Continuous - Inhalation  pantoprazole, 40 mg, oral, Daily before breakfast   Or  pantoprazole, 40 mg, intravenous, Daily before breakfast  polyethylene glycol, 17 g, oral, Daily  potassium chloride CR, 20 mEq, oral, BID  potassium chloride CR, 40 mEq, oral, BID  warfarin, 2  "mg, oral, Once             Nutrition Significant Labs:    Results from last 7 days   Lab Units 01/10/25  0815 01/09/25  0345 01/08/25  0429 01/07/25  0412 01/06/25  0519 01/05/25  0428 01/04/25  1014   GLUCOSE mg/dL 101* 96 108*   < > 83   < > 103*   SODIUM mmol/L 136 137 137   < > 139   < > 140   POTASSIUM mmol/L 3.3* 3.3* 3.7   < > 3.8   < > 4.2   CHLORIDE mmol/L 95* 92* 91*   < > 101   < > 100   CO2 mmol/L 32 35* 37*   < > 34*   < > 32   BUN mg/dL 37* 39* 30*   < > 13   < > 16   CREATININE mg/dL 1.09* 1.24* 1.19*   < > 0.78   < > 1.04   EGFR mL/min/1.73m*2 51* 44* 46*   < > 77   < > 54*   CALCIUM mg/dL 9.6 9.5 9.6   < > 8.5*   < > 8.9   MAGNESIUM mg/dL  --   --   --   --  2.03  --  2.09    < > = values in this interval not displayed.     No results found for: \"HGBA1C\"        Anthropometrics:  Height: 157.5 cm (5' 2.01\")   Weight: 79.4 kg (175 lb)   BMI (Calculated): 32  IBW/kg (Dietitian Calculated): 50 kg          Weight History:   Wt Readings from Last 10 Encounters:   01/10/25 79.4 kg (175 lb)   10/17/23 88 kg (194 lb)        Weight Change %:  Weight History / % Weight Change: No recent weights and patient unable to report             Nutrition Focused Physical Exam Findings:    Subcutaneous Fat Loss:   Orbital Fat Pads: Well nourished (slightly bulging fat pads)  Buccal Fat Pads: Well nourished (full, rounded cheeks)  Muscle Wasting:  Temporalis: Well nourished (well-defined muscle)  Pectoralis (Clavicular Region): Well nourished (clavicle not visible)  Edema:     Physical Findings:  Skin: Negative    Estimated Needs:   Total Energy Estimated Needs (kCal): 1500 kCal  Method for Estimating Needs: 30 kcal/kg IBW  Total Protein Estimated Needs (g): 60 g  Method for Estimating Needs: 1.2 gm/kg IBW     Method for Estimating Needs: per MD        Nutrition Diagnosis   Nutrition Diagnosis:  Malnutrition Diagnosis  Patient has Malnutrition Diagnosis:  (unable to determine)    Nutrition Diagnosis  Patient has Nutrition " Diagnosis: Yes  Diagnosis Status (1): New  Nutrition Diagnosis 1: Predicted inadequate energy intake  Related to (1): weakness, advanced age, and decreased intake  As Evidenced by (1): PO intake <75%       Nutrition Interventions/Recommendations   Nutrition Interventions and Recommendations:        Nutrition Prescription:  Individualized Nutrition Prescription Provided for : Supplements        Nutrition Interventions:   Food and/or Nutrient Delivery Interventions  Interventions: Medical food supplement  Medical Food Supplement: Commercial beverage  Goal: Ensure plus high protein BID - [place]         Nutrition Education:   Education Documentation  No documentation found.      Nutrition Counseling  Counseling Theoretical Approach: Nutrition counseling based on health belief model  Goal: Reviewed supplements       Nutrition Monitoring and Evaluation   Monitoring/Evaluation:   Food/Nutrient Related History Monitoring  Monitoring and Evaluation Plan: Energy intake  Energy Intake: Estimated energy intake  Criteria: PO intake >75%    Body Composition/Growth/Weight History  Monitoring and Evaluation Plan: Weight  Weight: Measured weight  Criteria: Stable weight    Biochemical Data, Medical Tests and Procedures  Monitoring and Evaluation Plan: Electrolyte/renal panel, Glucose/endocrine profile  Electrolyte and Renal Panel: BUN, Sodium, Calcium, ionized, Calcium, serum, Potassium, Phosphorus, Magnesium, Creatinine  Criteria: WNL  Glucose/Endocrine Profile: Glucose, casual  Criteria: WNL    Nutrition Focused Physical Findings  Monitoring and Evaluation Plan: Skin  Skin: Other (Comment)  Criteria: Intact skin            Time Spent/Follow-up Reminder:   Follow Up  Time Spent (min): 45 minutes  Last Date of Nutrition Visit: 01/10/25  Nutrition Follow-Up Needed?: Dietitian to reassess per policy  Follow up Comment: 1/15-1/17

## 2025-01-10 NOTE — PROGRESS NOTES
Pharmacy Consult for Warfarin (Coumadin) Management - Daily Progress Note     Bethany Espinoza is a 79 y.o. female admitted for Acute hypoxic respiratory failure (Multi). Pharmacy was consulted for warfarin dosing and monitoring.      Labs  INR   Date Value Ref Range Status   01/10/2025 2.0 (H) 0.9 - 1.1 Final   01/09/2025 2.0 (H) 0.9 - 1.1 Final   01/08/2025 2.2 (H) 0.9 - 1.1 Final     Review  Warfarin Indication: Atrial fibrillation or flutter  Target INR: 2 - 3     Home regimen: 2.5mg daily    Patient has been on a reduced regimen given supratherapeutic INR's initially. Hgb stable. Patient has been eating her meals the past 2 days. Previously was not eating well. She is on doxycycline. Will give 2mg tonight. Recheck INR tomorrow.     Orders placed per pharmacy consult. Pharmacy will continue to monitor and adjust therapy as needed.     Faye Burgess, PharmD

## 2025-01-10 NOTE — DOCUMENTATION CLARIFICATION NOTE
"    PATIENT:               OLLIE SANTAMARIA  ACCT #:                  [id number]  MRN:                       [id number]  :                       1945  ADMIT DATE:       1/3/2025 1:56 PM  DISCH DATE:  RESPONDING PROVIDER #:        [id number]          PROVIDER RESPONSE TEXT:    Type 2 MI secondary to demand ischemia    CDI QUERY TEXT:    Clarification        Instruction:    Based on your assessment of the patient and the clinical information, please provide the requested documentation by clicking on the appropriate radio button and enter any additional information if prompted.    Question: Is there a diagnosis indicative of the patient elevated Troponins and symptoms    When answering this query, please exercise your independent professional judgment. The fact that a question is being asked, does not imply that any particular answer is desired or expected.    The patient's clinical indicators include:  Clinical Information:  - 78yo female admitted with AOC dCHF, ARF hypoxic, RSV pneumonia and elevated troponins.  PMH also includes HTN, HLD, CAD, Afib, Aflutter, SSS, Progressive supranuclear palsy and obesity.    Clinical Indicators:  - 1/3  ED Dr Schwartz : \" Patient admitted to [facility] for RSV pneumonia with new oxygen requirement and type II NSTEMI. \"    - 1/3 H&P Zander Dove : \" Elevated troponin, suspect demand ischemia. Patient without any chest pain or anginal equivalents. High-sensitivity troponin initial 66-70; on admission had repeats which downtrended to 57 and then 55. \"    -   Cardiology Dr Mcnulty : \" Abnormal troponin elevation not suggestive of ACS.  This is likely related to underlying CHF/atypical pneumonia.\"      Treatment: Cardiology consult, EKG monitoring, supplemental O2, Coumadin.    Risk Factors: AOC dCHF, ARF hypoxic, RSV pneumonia, HTN, HLD, CAD, Afib, Aflutter, SSS, Progressive supranuclear palsy and obesity.  Options provided:  -- NSTEMI  -- Type 2 MI secondary to demand ischemia  -- Acute " Myocardial Injury  -- Chronic Myocardial Injury  -- Other - I will add my own diagnosis  -- Refer to Clinical Documentation Reviewer    Query created by: Marleny Garcia on 1/7/2025 10:24 AM      Electronically signed by:  ARNULFO BAUTISTA MD 1/10/2025 7:51 AM

## 2025-01-11 ENCOUNTER — PHARMACY VISIT (OUTPATIENT)
Dept: PHARMACY | Facility: CLINIC | Age: 80
End: 2025-01-11
Payer: COMMERCIAL

## 2025-01-11 VITALS
BODY MASS INDEX: 31.56 KG/M2 | SYSTOLIC BLOOD PRESSURE: 128 MMHG | OXYGEN SATURATION: 92 % | HEIGHT: 62 IN | TEMPERATURE: 98.1 F | WEIGHT: 171.5 LBS | HEART RATE: 62 BPM | DIASTOLIC BLOOD PRESSURE: 71 MMHG | RESPIRATION RATE: 16 BRPM

## 2025-01-11 LAB
ANION GAP SERPL CALC-SCNC: 12 MMOL/L (ref 10–20)
BASOPHILS # BLD AUTO: 0.03 X10*3/UL (ref 0–0.1)
BASOPHILS NFR BLD AUTO: 0.5 %
BUN SERPL-MCNC: 36 MG/DL (ref 6–23)
CALCIUM SERPL-MCNC: 9.6 MG/DL (ref 8.6–10.3)
CHLORIDE SERPL-SCNC: 99 MMOL/L (ref 98–107)
CO2 SERPL-SCNC: 29 MMOL/L (ref 21–32)
CREAT SERPL-MCNC: 1.03 MG/DL (ref 0.5–1.05)
EGFRCR SERPLBLD CKD-EPI 2021: 55 ML/MIN/1.73M*2
EOSINOPHIL # BLD AUTO: 0.25 X10*3/UL (ref 0–0.4)
EOSINOPHIL NFR BLD AUTO: 4.3 %
ERYTHROCYTE [DISTWIDTH] IN BLOOD BY AUTOMATED COUNT: 14.6 % (ref 11.5–14.5)
GLUCOSE SERPL-MCNC: 113 MG/DL (ref 74–99)
HCT VFR BLD AUTO: 37.6 % (ref 36–46)
HGB BLD-MCNC: 11.3 G/DL (ref 12–16)
IMM GRANULOCYTES # BLD AUTO: 0.01 X10*3/UL (ref 0–0.5)
IMM GRANULOCYTES NFR BLD AUTO: 0.2 % (ref 0–0.9)
INR PPP: 2.2 (ref 0.9–1.1)
LYMPHOCYTES # BLD AUTO: 0.65 X10*3/UL (ref 0.8–3)
LYMPHOCYTES NFR BLD AUTO: 11.2 %
MCH RBC QN AUTO: 26 PG (ref 26–34)
MCHC RBC AUTO-ENTMCNC: 30.1 G/DL (ref 32–36)
MCV RBC AUTO: 87 FL (ref 80–100)
MONOCYTES # BLD AUTO: 0.67 X10*3/UL (ref 0.05–0.8)
MONOCYTES NFR BLD AUTO: 11.5 %
NEUTROPHILS # BLD AUTO: 4.21 X10*3/UL (ref 1.6–5.5)
NEUTROPHILS NFR BLD AUTO: 72.3 %
NRBC BLD-RTO: 0 /100 WBCS (ref 0–0)
PLATELET # BLD AUTO: 245 X10*3/UL (ref 150–450)
POTASSIUM SERPL-SCNC: 3.8 MMOL/L (ref 3.5–5.3)
PROTHROMBIN TIME: 24.7 SECONDS (ref 9.8–12.8)
RBC # BLD AUTO: 4.34 X10*6/UL (ref 4–5.2)
SODIUM SERPL-SCNC: 136 MMOL/L (ref 136–145)
WBC # BLD AUTO: 5.8 X10*3/UL (ref 4.4–11.3)

## 2025-01-11 PROCEDURE — 85025 COMPLETE CBC W/AUTO DIFF WBC: CPT | Performed by: INTERNAL MEDICINE

## 2025-01-11 PROCEDURE — 94761 N-INVAS EAR/PLS OXIMETRY MLT: CPT

## 2025-01-11 PROCEDURE — 2500000001 HC RX 250 WO HCPCS SELF ADMINISTERED DRUGS (ALT 637 FOR MEDICARE OP): Performed by: PHARMACIST

## 2025-01-11 PROCEDURE — 85610 PROTHROMBIN TIME: CPT | Performed by: STUDENT IN AN ORGANIZED HEALTH CARE EDUCATION/TRAINING PROGRAM

## 2025-01-11 PROCEDURE — 2500000002 HC RX 250 W HCPCS SELF ADMINISTERED DRUGS (ALT 637 FOR MEDICARE OP, ALT 636 FOR OP/ED): Performed by: INTERNAL MEDICINE

## 2025-01-11 PROCEDURE — 94640 AIRWAY INHALATION TREATMENT: CPT

## 2025-01-11 PROCEDURE — 99239 HOSP IP/OBS DSCHRG MGMT >30: CPT | Performed by: INTERNAL MEDICINE

## 2025-01-11 PROCEDURE — 92526 ORAL FUNCTION THERAPY: CPT | Mod: GN

## 2025-01-11 PROCEDURE — 80048 BASIC METABOLIC PNL TOTAL CA: CPT | Performed by: INTERNAL MEDICINE

## 2025-01-11 PROCEDURE — 2500000005 HC RX 250 GENERAL PHARMACY W/O HCPCS: Performed by: STUDENT IN AN ORGANIZED HEALTH CARE EDUCATION/TRAINING PROGRAM

## 2025-01-11 PROCEDURE — 36415 COLL VENOUS BLD VENIPUNCTURE: CPT | Performed by: INTERNAL MEDICINE

## 2025-01-11 PROCEDURE — RXMED WILLOW AMBULATORY MEDICATION CHARGE

## 2025-01-11 PROCEDURE — 2500000001 HC RX 250 WO HCPCS SELF ADMINISTERED DRUGS (ALT 637 FOR MEDICARE OP): Performed by: INTERNAL MEDICINE

## 2025-01-11 RX ORDER — GUAIFENESIN 600 MG/1
1200 TABLET, EXTENDED RELEASE ORAL 2 TIMES DAILY
Qty: 20 TABLET | Refills: 0 | Status: SHIPPED | OUTPATIENT
Start: 2025-01-11

## 2025-01-11 RX ORDER — ACETAMINOPHEN 500 MG
5000 TABLET ORAL DAILY
Status: DISCONTINUED | OUTPATIENT
Start: 2025-01-11 | End: 2025-01-11 | Stop reason: HOSPADM

## 2025-01-11 RX ORDER — ALBUTEROL SULFATE 90 UG/1
2 INHALANT RESPIRATORY (INHALATION) EVERY 4 HOURS PRN
Qty: 8.5 G | Refills: 0 | Status: SHIPPED | OUTPATIENT
Start: 2025-01-11

## 2025-01-11 RX ORDER — WARFARIN 2 MG/1
2 TABLET ORAL EVERY EVENING
Qty: 30 TABLET | Refills: 0 | Status: SHIPPED | OUTPATIENT
Start: 2025-01-11 | End: 2025-02-10

## 2025-01-11 RX ORDER — WARFARIN 2 MG/1
2 TABLET ORAL ONCE
Status: COMPLETED | OUTPATIENT
Start: 2025-01-11 | End: 2025-01-11

## 2025-01-11 RX ORDER — NYSTATIN 100000 [USP'U]/G
1 POWDER TOPICAL 2 TIMES DAILY
Qty: 15 G | Refills: 0 | Status: SHIPPED | OUTPATIENT
Start: 2025-01-11 | End: 2025-01-26

## 2025-01-11 RX ORDER — AMLODIPINE BESYLATE 5 MG/1
5 TABLET ORAL DAILY
Qty: 30 TABLET | Refills: 0 | Status: SHIPPED | OUTPATIENT
Start: 2025-01-11 | End: 2025-02-10

## 2025-01-11 RX ORDER — ACETAMINOPHEN 500 MG
5000 TABLET ORAL DAILY
Qty: 30 TABLET | Refills: 0 | Status: SHIPPED | OUTPATIENT
Start: 2025-01-11 | End: 2025-02-10

## 2025-01-11 RX ORDER — BENZONATATE 100 MG/1
100 CAPSULE ORAL 3 TIMES DAILY PRN
Qty: 20 CAPSULE | Refills: 0 | Status: SHIPPED | OUTPATIENT
Start: 2025-01-11

## 2025-01-11 RX ADMIN — CHOLECALCIFEROL TAB 125 MCG (5000 UNIT) 5000 UNITS: 125 TAB at 15:10

## 2025-01-11 RX ADMIN — WARFARIN SODIUM 2 MG: 2 TABLET ORAL at 18:42

## 2025-01-11 RX ADMIN — Medication 2 L/MIN: at 15:11

## 2025-01-11 RX ADMIN — IPRATROPIUM BROMIDE AND ALBUTEROL SULFATE 3 ML: 2.5; .5 SOLUTION RESPIRATORY (INHALATION) at 08:32

## 2025-01-11 ASSESSMENT — COGNITIVE AND FUNCTIONAL STATUS - GENERAL
DRESSING REGULAR LOWER BODY CLOTHING: A LOT
CLIMB 3 TO 5 STEPS WITH RAILING: TOTAL
DAILY ACTIVITIY SCORE: 12
MOVING FROM LYING ON BACK TO SITTING ON SIDE OF FLAT BED WITH BEDRAILS: A LITTLE
TOILETING: A LOT
EATING MEALS: A LOT
WALKING IN HOSPITAL ROOM: TOTAL
HELP NEEDED FOR BATHING: A LOT
MOVING TO AND FROM BED TO CHAIR: A LOT
MOBILITY SCORE: 10
TURNING FROM BACK TO SIDE WHILE IN FLAT BAD: A LOT
PERSONAL GROOMING: A LOT
DRESSING REGULAR UPPER BODY CLOTHING: A LOT
STANDING UP FROM CHAIR USING ARMS: TOTAL

## 2025-01-11 ASSESSMENT — PAIN - FUNCTIONAL ASSESSMENT: PAIN_FUNCTIONAL_ASSESSMENT: 0-10

## 2025-01-11 ASSESSMENT — PAIN SCALES - GENERAL: PAINLEVEL_OUTOF10: 0 - NO PAIN

## 2025-01-11 NOTE — NURSING NOTE
Spoke to both of patients daughters and updated them both on patients condition overnight. Informed daughters of the coughing and waiting for speech to evaluate her. Patient has been assessed and checked on multiple times . Patient has been reassured that she is being checked in on and asked if there is anything she needs. Patient informed that her swallowing will be reevaluated by SLP to be sure that she is not an aspiration risk  before she has any solids or liquids. Patient has been coughing when offered liquids. Awaiting recommendation from SLP.

## 2025-01-11 NOTE — CARE PLAN
The clinical goals for the shift include patient will be free of falls this shift    Problem: Safety - Adult  Goal: Free from fall injury  Outcome: Progressing

## 2025-01-11 NOTE — DISCHARGE SUMMARY
Discharge Diagnosis  Acute hypoxic respiratory failure (Multi)  RSV bronchiolitis  Acute on chronic congestive heart failure  Elevated troponin  Primary hypertension  Obesity  Atrial fibrillation  Sick sinus syndrome        Issues Requiring Follow-Up      Discharge Meds     Medication List      ASK your doctor about these medications     amoxicillin-pot clavulanate 875-125 mg tablet; Commonly known as:   Augmentin; Ask about: Should I take this medication?   atorvastatin 10 mg tablet; Commonly known as: Lipitor   Cough Drops 2.7 mg lozenge; Generic drug: menthol   fluticasone 50 mcg/actuation nasal spray; Commonly known as: Flonase   furosemide 20 mg tablet; Commonly known as: Lasix   ipratropium-albuteroL 0.5-2.5 mg/3 mL nebulizer solution; Commonly known   as: Duo-Neb   Jantoven 2.5 mg tablet; Generic drug: warfarin   lubricating eye drops ophthalmic solution   metOLazone 2.5 mg tablet; Commonly known as: Zaroxolyn   psyllium 3.4 gram packet; Commonly known as: Metamucil       Test Results Pending At Discharge  Pending Labs       No current pending labs.            Hospital Course  Bethany Espinoza is a 79 y.o. female with PMHx s/f HTN, HLD, CAD, Afib/flutter on warfarin, SSS s/p PPM, chronic diastolic heart failure, pHTN, valvular heart disease, PVD, progressive supranuclear palsy (predominantly immobile; also has issues with dysphagia), obesity, presenting with SOB, cough, congestion, and worsening weakness. Hx mostly obtained from her daughter at the bedside. In short, pt was in her typical state of health until Monday (12/30/24) when she became congested. Was concerned this may have been RSV or a sinus infection as her grandson had visited and was just getting over RSV. She went to see her PCP and was prescribed decongestants and Augmentin 12/31/24 to help with symptoms; tested negative for Flu, COVID, and ?RSV at that time. Over the next few days, daughter called in to check on her; until day of admission  "(01/03/2025), patient had reported that she had been doing okay, but today she informed her daughter that she was feeling unwell and needed assistance. When her daughter arrived, she found the patient to sound like she was \"drowning\" in her fluid around her lungs which has happened with prior issues with her heart failure, additionally she had been so weak that her patient's  was unable to help get her up out of bed to go to the bathroom and she did end up soiling herself. Patient reports that she has felt nauseous and has had diarrhea x 1 day as well, does not appear to have missed any medications in terms of heart failure; however, has just been generally unwell. Denies cp/pressure, palpitations, diaphoresis, dizziness / lightheadedness, syncope or near syncope, HA, vision changes, f/c, abd pain. Has chronic baseline lower extremity edema which is unchanged times many years per daughter at the bedside.     ED Course (Summary - please note all labs, imaging studies, and interventions noted below have been personally reviewed and/or interpreted on day of admission):   Vitals on presentation: T98.2, /83, HR 60, RR 30 (most recently 24), SpO2 96% 2 L nasal cannula  Labs: CBC with WBC 5.2, Hgb 10.8, platelets 149.  CMP with glucose 121, sodium 138, potassium 4.3, BUN 17, serum creatinine 0.94.  Lactate 1.6.  .  High-sensitivity troponin 68-65-hwlicg on admission 57 and 55.  PT/INR: 22.4/2.8.  RSV positive; flu A, B, COVID PCR all negative.  VBG relatively unrevealing.  UA without evidence of infection.  EKG: Sinus with short NV; right bundle branch block which has been previously seen on prior EKGs as well, no overt acute ischemic changes compared to prior  Imaging: CXR with moderate cardiomegaly, vascular congestion  Interventions: No medications given in the ED; cardiology was consulted for elevated troponin and they felt given the EKG stability compared to prior there was no need for " heparinization unless the patient was not therapeutic with her warfarin.  Admitted to medicine for further management     12-point ROS reviewed and found to be negative aside from aforementioned positives in HPI and/or noted in dedicated ROS section below.      1/4/2025: Patient c/o shortness of breath. She remained on NC oxygen 4 L. Cardiology note appreciated. Add Norvasc and Zaroxolyn. Due to HR 60, no home meds of metoprolol listed.     1/5/2025: Patient remained shortness of breath and wheezing. She is on NC oxygen 3.5 L. Breo ellipta ordered for wheezing. Add empirical Doxycyclione for possible bacterial infection  1/6/2025: Patient was seen and examined.  Continues to complain of dry cough and some stuffy nose.  I we will add a humidifier to patient's oxygen.  Saturating 97% on 4 L will have weaned down to 3 and will continue weaning as tolerated.  Continue breathing treatment steroids and antibiotics.  Echocardiogram shows EF of 55 to 60% with dilated left atrium and mildly elevated right ventricular systolic pressure.  Blood cultures are negative x 2 days.  INR still supratherapeutic at 3.6.  Repeat CBC and BMP in AM.  1/7/2025: Patient was seen and examined.  Now saturating well on 3 L nasal cannula oxygen.  Still complaining of cough and is severely deconditioned.  Bicarb level is at 37 today likely due to contraction alkalosis.  Will order 1 dose of acetazolamide IV.  PT recommended moderate intensity was patient is refusing skilled nursing facility.  INR is now therapeutic at 2.5 today.  Will resume warfarin today.  Pharmacy to dose.  Repeat CBC and BMP in AM.  1/8/2025: Patient was seen and examined.  Oxygen has been weaned down to 2 L nasal cannula oxygen today.  Patient showed bump in creatinine to 1.19 today.  Zaroxolyn has been discontinued.  She got 1 dose of Lasix this morning which I have suspended.  We will add chest physical therapy and scheduled DuoNebs today.  Respiratory therapist to add  chest physical therapy.  INR still therapeutic at 2.2.  Continue warfarin at current dose.  Continue to trend CBC and BMP daily.  1/9/2025: Patient was seen and examined.  Oxygen has been weaned down to 1 L nasal cannula oxygen today.  Creatinine trended up slightly to 1.24 today.  INR was still therapeutic at 2.0.  Patient complains still of generalized weakness however refusing skilled nursing facility continue chest physical therapy and will continue to wean oxygen as tolerated.  Maintain warfarin dose.   1/10/2025: Patient was seen and examined.  Saturating well on 1 L nasal cannula oxygen.  Creatinine has trended down to 1.09 today.  INR still therapeutic at 2.0.  Continue p.o. warfarin.  Continue to wean oxygen as tolerated.  Discussed extensively with patient's daughter Kathya at bedside today. CT chest shows no ILD.  Patient will need as needed albuterol inhaler on discharge today.  Potential discharge within the next 24 to 48 hours.  1/11/2025: Patient was seen and examined.  INR is therapeutic at 2.2.  I will switch warfarin to 2 mg daily on discharge.  Home oxygen evaluation was completed and patient will be requiring 2 L nasal cannula oxygen continuous on discharge.  She was discharged in stable condition to follow-up as outpatient with primary care physician within 1 week of discharge and with cardiologist as scheduled.    The discharge process took about 35 minutes    Pertinent Physical Exam At Time of Discharge  Physical Exam  Vitals:    01/11/25 1106   BP: 126/72   Pulse: 60   Resp: 16   Temp: 36.6 °C (97.9 °F)   SpO2: 91%   Constitutional:       General: She is in acute distress.      Appearance: She is ill-appearing.   HENT:      Head: Normocephalic.      Mouth/Throat:      Mouth: Mucous membranes are moist.      Pharynx: Oropharynx is clear.   Eyes:      Pupils: Pupils are equal, round, and reactive to light.   Cardiovascular:      Rate and Rhythm: Normal rate and regular rhythm.      Heart  sounds: Normal heart sounds. No murmur heard.     No gallop.   Pulmonary:      Effort: Respiratory distress present.      Breath sounds: Wheezing and rales present.   Abdominal:      General: Bowel sounds are normal. There is no distension.      Palpations: Abdomen is soft.      Tenderness: There is no abdominal tenderness.   Musculoskeletal:         General: No swelling. Normal range of motion.      Cervical back: Neck supple. No rigidity.   Skin:     General: Skin is warm and dry.   Neurological:      General: No focal deficit present.      Mental Status: She is alert.      Cranial Nerves: No cranial nerve deficit.      Sensory: No sensory deficit.   Psychiatric:         Mood and Affect: Mood normal.         Behavior: Behavior normal.        Outpatient Follow-Up  No future appointments.      Richie Cassidy MD

## 2025-01-11 NOTE — PROGRESS NOTES
Pharmacy Consult for Warfarin (Coumadin) Management - Daily Progress Note     Bethany Espinoza is a 79 y.o. female admitted for Acute hypoxic respiratory failure (Multi). Pharmacy was consulted for warfarin dosing and monitoring.    Labs  INR   Date Value Ref Range Status   01/11/2025 2.2 (H) 0.9 - 1.1 Final   01/10/2025 2.0 (H) 0.9 - 1.1 Final   01/09/2025 2.0 (H) 0.9 - 1.1 Final     Review  Warfarin Indication: Atrial fibrillation or flutter  Target INR: 2 - 3     Home regimen: 2.5mg daily     Patient has been on a reduced regimen given supratherapeutic INR's initially. Hgb stable.  Previously was not eating well. She is on doxycycline. Will give 2mg tonight. Recheck INR tomorrow.   Orders placed per pharmacy consult. Pharmacy will continue to monitor and adjust therapy as needed.   Juan Ortega [facility]

## 2025-01-11 NOTE — TREATMENT PLAN
Home O2 Evaluation:    SpO2 on room air at rest:   88  %    SpO2 on room air with exertion:    na %    SpO2 on oxygen at rest:     93% 2LPm    SpO2 on oxygen with exertion:  91   %2LPM    SpO2 on 4L/min O2 with exertion:   na %    Ambulation distance:   pt did arm and leg motions for ambulation     Recommended O2 device: nasal canula     Recommended O2 L/min: 2LPM    Recommended FiO2 %:28% fio2    (Makeda Coley at [facility]ilit[facility] going O2.)

## 2025-01-11 NOTE — PROGRESS NOTES
Met with patient and daughter at bedside, introduced self, discussed Home Health since they are not open to SNF. She is agreeable to that, AOC is [facility] as patient is active with a [facility] doctor. Will send referrals for acceptance, if unable patient is aware further choices will need to be made. IMM was completed and signed. Patient is requesting transport home, floor can arrange a cot transfer as patient has 7 steps she will not be able to get up. Patient lives at home with spouse and also daughters will be there to assist. Will follow up for accepting Home Care agency.     [facility] is able to accept. SOC within 48 hours. Patient/family aware.     Patient requires 2L NC, RT sent secure chat , Makeda Coley is aware.

## 2025-01-11 NOTE — PROGRESS NOTES
Speech-Language Pathology Clinical Swallow Treatment    Patient Name: Bethany Espinoza  MRN: [id number]  : 1945  Today's Date: 25  Start time: Start Time: 1215  Stop time: Stop Time: 1247  Time calculation (min) : Time Calculation (min): 32 min    ASSESSMENT  SLP TX Intervention Outcome: Making Progress Towards Goals     Treatment Tolerance: Patient tolerated treatment well  Prognosis: Fair     IMPRESSIONS:     Pt exhibiting swallow function near baseline and tolerating current diet with inconsistent coughing. Pt and family reporting cough is baseline. Pt would benefit from continued ST for continued assessment of safe diet tolerance and follow through of compensatory strategies.     PLAN  Recommended solid consistency: Soft/bite-sized (IDDSI level 6)  Recommended liquid consistency: Thin (IDDSI 0)  Recommended medication administration: Whole, in thin liquid, in puree  Safe swallow strategies: - Upright positioning for all PO intake  - Remain upright for >30 min after meals  - Slow rate of intake  - Small bites  - Small sips  - Single sips  - Alternate bites and sips  Discharge recommendation: Recommend LOW intensity ST upon DC in order to ensure safety with least restrictive diet.  Inpatient/Swing Bed or Outpatient: Inpatient  SLP TX Plan: Continue Plan of Care  SLP Plan: Skilled SLP  SLP Frequency: 2x per week  Duration: Other (Comment)  Next Treatment Priority: continue assessment of safe diet tolerance and follow through of compensatory strategies.  Discussed POC: Patient  Patient/Caregiver Agreeable: Yes      Dysphagia Goals (2025-2025):   Patient will tolerate recommended diet without observed clinical signs of aspiration              Status:Progressing              Progress: Pt tolerated soft and bite sized and thin liquids with breakfast tray with inconsistent cough, decreased coughing with use of strategies.     Patient will demonstrate appropriate strategies for swallowing safety                Status: Progressing              Progress: Pt demonstrated use of strategies with sba       SUBJECTIVE  Prior to Session Communication: Bedside nurse  RN cleared pt to participate in session and reported coughing with thin liquids  Respiratory status: Supplemental oxygen via NC  Positioning: Upright in bed  Pt was alert, pleasant, and cooperative for session.  Pt noted feels her cough is baseline.    Pain Assessment: 0-10  0-10 (Numeric) Pain Score: 0 - No pain  Pain Type: Acute pain (l chest non radiating)  Pain Location: Chest (r/t cough)  Pain Interventions: Declines    ORIENTATION: Oriented to self, Oriented to location, and Oriented to month    OBJECTIVE  Swallow reassessment:  Therapeutic Swallow  Therapeutic Swallow Intervention : PO Trials, Caregiver Education  Liquid Diet Recommendations: Thin (IDDSI Level 0)  Swallow Comments: RN reporting coughing with PO intake of water. Physcian orders placed for reevaluation. Pt completed trials of ice chips, thin edge of cup and through straw, puree and soft and bite sized. Pt demonstrating inconistent coughing with trials. Pt and family reporting that pt demonstrate this same cough at baseline. Pt demonstrated decrease cough with use of strategies. Provided education on compenstory strategies, reeducation of previously completed MBSS and recommendations for oral care to decrease risk of aspiration. Pt and family reporting no concerns for swallow function and conitnue to endorse coughing as baseline ~4 years. Provided education and recommendations for continued follow up after discharge. Pt and family agreeable to educations and recommendations.    Treatment/Education:  Results and recommendations were relayed to: Patient, Family, Bedside nurse, and Physician  Education provided: Yes   Learner: Patient and Family   Barriers to learning: None   Method of teaching: Verbal   Topic: results of assessment, risk for aspiration, recommended diet modifications,  recommended safe swallow strategies, and recommendation for dysphagia follow-up   Outcome of teaching: Pt/family demonstrated good understanding

## 2025-01-12 LAB
ATRIAL RATE: 144 BPM
ATRIAL RATE: 283 BPM
P AXIS: 0 DEGREES
P AXIS: 0 DEGREES
PR INTERVAL: 63 MS
PR INTERVAL: 70 MS
Q ONSET: 252 MS
Q ONSET: 253 MS
QRS COUNT: 9 BEATS
QRS COUNT: 9 BEATS
QRS DURATION: 151 MS
QRS DURATION: 159 MS
QT INTERVAL: 484 MS
QT INTERVAL: 491 MS
QTC CALCULATION(BAZETT): 484 MS
QTC CALCULATION(BAZETT): 491 MS
QTC FREDERICIA: 484 MS
QTC FREDERICIA: 491 MS
R AXIS: -84 DEGREES
R AXIS: -87 DEGREES
T AXIS: 78 DEGREES
T AXIS: 82 DEGREES
T OFFSET: 495 MS
T OFFSET: 498 MS
VENTRICULAR RATE: 60 BPM
VENTRICULAR RATE: 60 BPM